# Patient Record
Sex: FEMALE | Race: WHITE | NOT HISPANIC OR LATINO | Employment: OTHER | URBAN - NONMETROPOLITAN AREA
[De-identification: names, ages, dates, MRNs, and addresses within clinical notes are randomized per-mention and may not be internally consistent; named-entity substitution may affect disease eponyms.]

---

## 2017-02-02 ENCOUNTER — OFFICE VISIT (OUTPATIENT)
Dept: ORTHOPEDIC SURGERY | Facility: CLINIC | Age: 67
End: 2017-02-02

## 2017-02-02 DIAGNOSIS — S92.345D CLOSED NONDISPLACED FRACTURE OF FOURTH METATARSAL BONE OF LEFT FOOT WITH ROUTINE HEALING, SUBSEQUENT ENCOUNTER: ICD-10-CM

## 2017-02-02 DIAGNOSIS — S92.902D FOOT FRACTURE, LEFT, WITH ROUTINE HEALING, SUBSEQUENT ENCOUNTER: Primary | ICD-10-CM

## 2017-02-02 PROCEDURE — 99213 OFFICE O/P EST LOW 20 MIN: CPT | Performed by: ORTHOPAEDIC SURGERY

## 2017-02-02 PROCEDURE — 73620 X-RAY EXAM OF FOOT: CPT | Performed by: ORTHOPAEDIC SURGERY

## 2017-02-02 NOTE — PROGRESS NOTES
Chief Complaint   Patient presents with   • Left Foot - Follow-up           HPI the patient is here today for a follow up of her left foot. She fractured this foot on 08/01/2016.  Patient is doing well at this point.  Swelling and bruising are fully settled down.  She does not have a clinical deformity.  Neurovascular status is intact.  Dorsiflexion and lateral flexion are fully preserved.  She is able to circumduct the foot without any difficulty.  Her gait is normal without any limp.  She is able to place her foot in a plantigrade position without any difficulty.  The forefoot shows a hallux rigidus deformity.  Patient has limited range of dorsiflexion of the first MTP joint of the great toe.  She has tenderness over the dorsal capsule of the first metatarsophalangeal joint.  Flexor and extensor tendon function are both well preserved        There were no vitals filed for this visit.        Review of Systems   Constitutional: Negative for activity change, appetite change, fatigue, fever and unexpected weight change.   HENT: Negative for congestion, sneezing and voice change.    Eyes: Negative for visual disturbance.   Respiratory: Negative for cough, chest tightness and wheezing.    Cardiovascular: Negative for chest pain, palpitations and leg swelling.   Gastrointestinal: Negative for abdominal distention, constipation, diarrhea and vomiting.   Endocrine: Negative for polydipsia, polyphagia and polyuria.   Genitourinary: Negative for decreased urine volume, difficulty urinating, dysuria, flank pain and frequency.   Musculoskeletal: Positive for arthralgias. Negative for back pain, gait problem, joint swelling, neck pain and neck stiffness.   Skin: Negative for color change, pallor and wound.   Allergic/Immunologic: Negative for environmental allergies and food allergies.   Neurological: Negative for dizziness, weakness and headaches.   Hematological: Does not bruise/bleed easily.   Psychiatric/Behavioral:  Negative for agitation, confusion and decreased concentration. The patient is not nervous/anxious.            Physical Exam   Constitutional: She is oriented to person, place, and time. She appears well-developed and well-nourished.   HENT:   Head: Normocephalic.   Eyes: Conjunctivae are normal. Pupils are equal, round, and reactive to light.   Neck: Normal range of motion. Neck supple. No JVD present.   Cardiovascular: Normal rate, normal heart sounds and intact distal pulses.    Pulmonary/Chest: Effort normal and breath sounds normal. No respiratory distress.   Abdominal: Soft. Bowel sounds are normal. There is no tenderness. There is no guarding.   Lymphadenopathy:     She has no cervical adenopathy.   Neurological: She is alert and oriented to person, place, and time. She has normal reflexes.   Skin: Skin is warm and dry.   Psychiatric: Her behavior is normal. Judgment and thought content normal.   Vitals reviewed.          Joint/Body Part Specific Exam: Left foot:  The foot is NOT swollen and tender. Lisfranc joint is stable. There is NO tenderness along the shaft of the 5th metatarsal extending up to the base. Appropriate amounts of swelling and bruising are noted. There is no evidence of a compartmental syndrome or instability of the midfoot. Dorsalis pedis and posterior tibial artery pulses are palpable. Common peroneal nerve function is well preserved. Dorsiflexion and plantarflexion are both normal along with the 4th metatarsal injury. The arches of the foot are fairly well preserved. Patient’s gait cannot be checked because they are unable to bear any weight on the lower extremity. The ankle mortise is stable.       X-RAY Report:  left Ankle X-Ray  Indication: Evaluation of healing off a fourth metatarsal fracture  Views: AP, Lateral, Mortise  Findings: Well healed, fully consolidated fracture  yes fracture  no bony lesion  Soft tissues within normal limits  within normal limits joint spaces  Hardware  appropriately positioned not applicable    yes prior studies available for comparison.    X-RAY was ordered and reviewed by Ron Aranda MD        Diagnostics:        Dk was seen today for follow-up.    Diagnoses and all orders for this visit:    Foot fracture, left, with routine healing, subsequent encounter  -     XR Foot 2 View Left    Closed nondisplaced fracture of fourth metatarsal bone of left foot with routine healing, subsequent encounter          Procedures        Plan:  Weightbearing as tolerated.    Reinjury precautions.    Home-based exercise program including exercises of the hindfoot.    Calcium and vitamin D for bone health.    Patient has a hallux rigidus of the great toe which might eventually require surgical intervention including the possibility of a dorsal cheilectomy; she is wanting to think about that.    Follow-up in my office when necessary basis

## 2017-02-11 PROBLEM — S92.902A FOOT FRACTURE, LEFT: Status: ACTIVE | Noted: 2017-02-11

## 2018-06-19 ENCOUNTER — OFFICE VISIT CONVERTED (OUTPATIENT)
Dept: PULMONOLOGY | Facility: CLINIC | Age: 68
End: 2018-06-19
Attending: PHYSICIAN ASSISTANT

## 2018-07-18 ENCOUNTER — CONVERSION ENCOUNTER (OUTPATIENT)
Dept: MAMMOGRAPHY | Facility: HOSPITAL | Age: 68
End: 2018-07-18

## 2019-03-15 ENCOUNTER — HOSPITAL ENCOUNTER (OUTPATIENT)
Dept: GENERAL RADIOLOGY | Facility: HOSPITAL | Age: 69
Discharge: HOME | End: 2019-03-15
Attending: PHYSICIAN ASSISTANT

## 2019-03-28 ENCOUNTER — OFFICE VISIT CONVERTED (OUTPATIENT)
Dept: PULMONOLOGY | Facility: CLINIC | Age: 69
End: 2019-03-28
Attending: INTERNAL MEDICINE

## 2019-09-04 ENCOUNTER — OUTSIDE FACILITY SERVICE (OUTPATIENT)
Dept: SLEEP MEDICINE | Facility: HOSPITAL | Age: 69
End: 2019-09-04

## 2019-09-04 ENCOUNTER — HOSPITAL ENCOUNTER (OUTPATIENT)
Dept: SLEEP MEDICINE | Facility: HOSPITAL | Age: 69
Discharge: HOME | End: 2019-09-04
Attending: INTERNAL MEDICINE

## 2019-09-04 PROCEDURE — 99204 OFFICE O/P NEW MOD 45 MIN: CPT | Performed by: INTERNAL MEDICINE

## 2019-09-19 ENCOUNTER — OFFICE VISIT CONVERTED (OUTPATIENT)
Dept: CARDIOLOGY | Facility: CLINIC | Age: 69
End: 2019-09-19
Attending: INTERNAL MEDICINE

## 2019-09-19 ENCOUNTER — CONVERSION ENCOUNTER (OUTPATIENT)
Dept: CARDIOLOGY | Facility: CLINIC | Age: 69
End: 2019-09-19

## 2020-01-20 ENCOUNTER — OFFICE VISIT CONVERTED (OUTPATIENT)
Dept: PULMONOLOGY | Facility: CLINIC | Age: 70
End: 2020-01-20
Attending: PHYSICIAN ASSISTANT

## 2020-01-29 ENCOUNTER — HOSPITAL ENCOUNTER (OUTPATIENT)
Dept: GENERAL RADIOLOGY | Facility: HOSPITAL | Age: 70
Discharge: HOME | End: 2020-01-29
Attending: PHYSICIAN ASSISTANT

## 2020-03-11 ENCOUNTER — CONVERSION ENCOUNTER (OUTPATIENT)
Dept: OTHER | Facility: HOSPITAL | Age: 70
End: 2020-03-11

## 2020-12-07 ENCOUNTER — HOSPITAL ENCOUNTER (OUTPATIENT)
Dept: SLEEP MEDICINE | Facility: HOSPITAL | Age: 70
Discharge: HOME | End: 2020-12-07
Attending: INTERNAL MEDICINE

## 2020-12-07 ENCOUNTER — OUTSIDE FACILITY SERVICE (OUTPATIENT)
Dept: SLEEP MEDICINE | Facility: HOSPITAL | Age: 70
End: 2020-12-07

## 2020-12-07 PROCEDURE — 99213 OFFICE O/P EST LOW 20 MIN: CPT | Performed by: INTERNAL MEDICINE

## 2021-01-05 ENCOUNTER — OFFICE VISIT CONVERTED (OUTPATIENT)
Dept: GASTROENTEROLOGY | Facility: CLINIC | Age: 71
End: 2021-01-05
Attending: NURSE PRACTITIONER

## 2021-04-14 ENCOUNTER — OFFICE VISIT CONVERTED (OUTPATIENT)
Dept: PULMONOLOGY | Facility: CLINIC | Age: 71
End: 2021-04-14
Attending: PHYSICIAN ASSISTANT

## 2021-04-20 ENCOUNTER — HOSPITAL ENCOUNTER (OUTPATIENT)
Dept: GENERAL RADIOLOGY | Facility: HOSPITAL | Age: 71
Discharge: HOME | End: 2021-04-20
Attending: PHYSICIAN ASSISTANT

## 2021-05-05 ENCOUNTER — OFFICE VISIT CONVERTED (OUTPATIENT)
Dept: GASTROENTEROLOGY | Facility: CLINIC | Age: 71
End: 2021-05-05
Attending: NURSE PRACTITIONER

## 2021-05-14 VITALS
HEART RATE: 81 BPM | BODY MASS INDEX: 30.96 KG/M2 | WEIGHT: 181.37 LBS | TEMPERATURE: 98 F | SYSTOLIC BLOOD PRESSURE: 161 MMHG | HEIGHT: 64 IN | DIASTOLIC BLOOD PRESSURE: 93 MMHG

## 2021-05-14 NOTE — PROGRESS NOTES
Progress Note      Patient Name: Dk Zuniga   Patient ID: [id number]   Sex: Female   YOB: 1950    Primary Care Provider: Haritha Iraheta MD   Referring Provider: Susan CRAWFORD    Visit Date: January 5, 2021    Provider: TY Nagel   Location: [facility] [address]   Location Address: [address] [place] [address]  [id number]   Location Phone: [phone]          Chief Complaint  · constipated   · nausea   · vomiting       History Of Present Illness  Dk Zuniga is a 70 year old /White female who presents to the office today.      Pt initially seen in March 2020 for dysphagia and some constipation. Pt did not do scopes that were ordered d/t caregiving for mother and COVID. Patient thinks last colonoscopy about 10 years ago.  Patient follows with Dr. Cortes, diagnosed with possible pulmonary fibrosis per patient.  History of being seen here in 2016 due to esophageal spasm but was doing well on Cardizem.    Pt states she's having worsening constipation, Assumption #1; states dysphagia is actually better. Pt requests med today for constipation. States going gluten free has helped her a little bit w improvement in constipation. No blood seen in stool. Pt states she was drinking some ETOH but has stopped, acid reflux is better. Still taking Protonix. No unint wt loss. Pt states she's willing to do scopes but cannot do now d/t needing transportation and also wants to have COVID vaccine first, states will do scopes in summer. Pt also caregiving for her .       Past Medical History  Asthma; Dysphagia; Essential hypertension; Family history of colon cancer; Fibrosis lung; GERD (gastroesophageal reflux disease); High cholesterol; Lung disease; Sjogren's disease; Sleep apnea; Spontaneous pneumothorax         Past Surgical History  Appendectomy; Colonoscopy; EGD; Gallbladder; Hysterectomy         Medication List  Name Date Started Instructions   albuterol sulfate  90 mcg/actuation inhalation HFA aerosol inhaler  inhale 1 puff (90 mcg) by inhalation route every 6 hours as needed   atorvastatin 20 mg oral tablet  take 1 tablet (20 mg) by oral route once daily   baclofen 10 mg oral tablet  take 1 tablet (10 mg) by oral route 3 times per day   Benadryl 25 mg oral capsule  take 1 capsule (25 mg) by oral route 3 times per day   Bystolic 5 mg oral tablet  take 1 tablet (5 mg) by oral route once daily   Co Q-10 100 mg oral capsule  take 1 capsule by oral route daily   diltiazem HCl 30 mg oral tablet  take 1 tablet (30 mg) by oral route 3 times per day   elderberry fruit and flower 460-115 mg oral capsule  take 1 capsule by oral route daily   EpiPen 0.3 mg/0.3 mL injection auto-injector  inject 0.3 milliliter (0.3 mg) by intramuscular route once as needed for anaphylaxis   Fish Oil 1,000 mg (120 mg-180 mg) oral capsule  take 1 capsule by oral route daily   flaxseed oil 1,000 mg oral capsule  take 1 capsule by oral route daily   fluticasone propionate 50 mcg/actuation nasal spray,suspension  spray 1 spray (50 mcg) in each nostril by intranasal route once daily   Golytely 236-22.74-6.74 -5.86 gram oral recon soln 03/11/2020 take as directed   hydrochlorothiazide 12.5 mg oral capsule  take 1 capsule (12.5 mg) by oral route once daily   losartan 50 mg oral tablet  take 1 tablet (50 mg) by oral route 2 times per day   magnesium 200 mg oral tablet  take 3 tablets by oral route daily   Miralax 17 gram/dose oral powder  take 17 gram mixed with 8 oz. water, juice, soda, coffee or tea by oral route once daily   montelukast 10 mg oral tablet  take 1 tablet (10 mg) by oral route once daily in the evening   nitroglycerin oral  take 1 by oral route daily   pantoprazole 40 mg oral tablet,delayed release (DR/EC)  take 1 tablet (40 mg) by oral route once daily   Pepcid 40 mg oral tablet  take 1 tablet (40 mg) by oral route once daily at bedtime   Stool Softener 50 mg oral capsule  take 1 capsule (50  mg) by oral route once daily at bedtime as needed   turmeric 400 mg oral capsule  take 1 capsule by oral route daily   Ventolin HFA 90 mcg/actuation inhalation HFA aerosol inhaler  inhale 1 puff (90 mcg) by inhalation route every 6 hours as needed   Vitamin B-12 1,000 mcg oral tablet  take 1 tablet by oral route daily   Vitamin D2 1,250 mcg (50,000 unit) oral capsule  take 1 capsule by oral route   zinc 50 mg oral tablet  take 1 tablet by oral route daily   Zofran 4 mg oral tablet  take 2 tablets (8 mg) by oral route 1-2 hours prior to radiation therapy         Allergy List  Latex; PENICILLINS       Allergies Reconciled  Family Medical History  Family history of colon cancer; Family history of heart disease         Social History  Alcohol (Current some day); lives with spouse; ; Retired; Tobacco (Former)         Review of Systems  · Constitutional  o Admits  o : good general health lately, no acute distress  · Gastrointestinal  o Denies  o : additional gastrointestinal symptoms except as noted in the HPI  · Psychiatric  o Admits  o : pleasant affect      Physical Examination  · Constitutional  o Appearance  o : well developed, well-nourished, in no acute distress  · Head and Face  o Head  o :   § Inspection  § : atraumatic, normocephalic  · Eyes  o Sclerae  o : sclerae white, no sclerae icterus  · Neck  o Inspection/Palpation  o : supple  · Respiratory  o Respiratory Effort  o : breathing unlabored  o Inspection of Chest  o : normal appearance, no retractions  · Cardiovascular  o Peripheral Vascular System  o :   § Extremities  § : no cyanosis, clubbing or edema  · Gastrointestinal  o Abdominal Examination  o : soft, nontender to palpation  · Skin and Subcutaneous Tissue  o General Inspection  o : no lesions present, no rashes present  · Neurologic  o Mental Status Examination  o :   § Orientation  § : grossly oriented to person, place and time  § Speech/Language  § : communication ability within normal  limits, voice quality normal, articulation of speech normal, no evidence of aphasia  § Attention  § : attention normal, concentration abilities normal  o Sensation  o : grossly intact  o Gait and Station  o :   § Gait Screening  § : normal gait  · Psychiatric  o General  o : Alert and oriented x3  o Mood and Affect  o : Mood and affect are appropriate to circumstances              Assessment  · Constipation     564.00/K59.00  · Dysphagia     787.20/R13.10  · Esophageal spasm     530.5/K22.4  HX of      Plan  · Medications  o Linzess 72 mcg oral capsule   SIG: take 1 capsule by oral route daily for 30 days take 30 min before meal   DISP: (30) Capsule with 3 refills  Prescribed on 01/05/2021     · Instructions  o Encouraged to follow-up with Primary Care Provider for preventative care.  o Patient was educated/instructed on their diagnosis, treatment and medications prior to discharge from the clinic today.  o Patient instructed to seek medical attention urgently for new or worsening symptoms.  o Follow Up in 4 months.  o Set up scopes at f/u            Electronically Signed by: TY Nagel -Author on January 5, 2021 05:32:39 PM

## 2021-05-15 VITALS
SYSTOLIC BLOOD PRESSURE: 156 MMHG | BODY MASS INDEX: 31.92 KG/M2 | HEART RATE: 74 BPM | WEIGHT: 187 LBS | DIASTOLIC BLOOD PRESSURE: 78 MMHG | HEIGHT: 64 IN

## 2021-05-15 VITALS
HEIGHT: 64 IN | DIASTOLIC BLOOD PRESSURE: 64 MMHG | HEART RATE: 69 BPM | BODY MASS INDEX: 31.76 KG/M2 | SYSTOLIC BLOOD PRESSURE: 152 MMHG | WEIGHT: 186 LBS

## 2021-05-27 ENCOUNTER — OFFICE VISIT CONVERTED (OUTPATIENT)
Dept: CARDIOLOGY | Facility: CLINIC | Age: 71
End: 2021-05-27
Attending: INTERNAL MEDICINE

## 2021-05-28 ENCOUNTER — TRANSCRIBE ORDERS (OUTPATIENT)
Dept: ADMINISTRATIVE | Facility: HOSPITAL | Age: 71
End: 2021-05-28

## 2021-05-28 ENCOUNTER — TRANSCRIBE ORDERS (OUTPATIENT)
Dept: CARDIOLOGY | Facility: CLINIC | Age: 71
End: 2021-05-28

## 2021-05-28 VITALS
BODY MASS INDEX: 30.96 KG/M2 | TEMPERATURE: 98.5 F | OXYGEN SATURATION: 98 % | SYSTOLIC BLOOD PRESSURE: 175 MMHG | SYSTOLIC BLOOD PRESSURE: 150 MMHG | WEIGHT: 189 LBS | RESPIRATION RATE: 14 BRPM | HEIGHT: 64 IN | HEIGHT: 64 IN | DIASTOLIC BLOOD PRESSURE: 73 MMHG | OXYGEN SATURATION: 98 % | RESPIRATION RATE: 16 BRPM | TEMPERATURE: 98 F | HEART RATE: 70 BPM | HEART RATE: 96 BPM | WEIGHT: 181.37 LBS | BODY MASS INDEX: 32.27 KG/M2 | DIASTOLIC BLOOD PRESSURE: 68 MMHG

## 2021-05-28 VITALS
RESPIRATION RATE: 14 BRPM | HEART RATE: 80 BPM | SYSTOLIC BLOOD PRESSURE: 152 MMHG | WEIGHT: 179.5 LBS | BODY MASS INDEX: 31.8 KG/M2 | OXYGEN SATURATION: 98 % | HEIGHT: 63 IN | DIASTOLIC BLOOD PRESSURE: 75 MMHG | TEMPERATURE: 97.3 F

## 2021-05-28 VITALS
HEART RATE: 72 BPM | WEIGHT: 188.25 LBS | RESPIRATION RATE: 16 BRPM | DIASTOLIC BLOOD PRESSURE: 66 MMHG | HEIGHT: 64 IN | OXYGEN SATURATION: 96 % | BODY MASS INDEX: 32.14 KG/M2 | SYSTOLIC BLOOD PRESSURE: 162 MMHG | TEMPERATURE: 98.5 F

## 2021-05-28 DIAGNOSIS — R07.9 CHEST PAIN, UNSPECIFIED TYPE: Primary | ICD-10-CM

## 2021-05-28 NOTE — PROGRESS NOTES
Patient: TIFFANIE SÁNCHEZ     Acct: [id number]     Report: #[id number]  UNIT #: [id number]     : 1950    Encounter Date:2021  PRIMARY CARE: WAI ROMERO  ***Signed***  --------------------------------------------------------------------------------------------------------------------  Chief Complaint      Encounter Date      2021            Primary Care Provider      WAI ROMERO            Patient Complaint      Patient is complaining of      patient here for 1yr follow up SjÃ¶gren's syndrome with cystic bronchiectasis            VITALS      Height 5 ft 3 in / 160.02 cm      Weight 179 lbs 8 oz / 81.224095 kg      BSA 1.85 m2      BMI 31.8 kg/m2      Temperature 97.3 F / 36.28 C - Temporal      Pulse 80      Respirations 14      Blood Pressure 152/75 Sitting, Right Arm      Pulse Oximetry 98%, room air            HPI      The patient is a 71 year old female with history of Sjogren's disease and     bronchiectasis here for follow up today.             She is a patient of Dr. Johansen and was last seen in the clinic on 20 and     the patient has had difficulty following up due to the pandemic. Overall the     patient states she is doing well, states she feels okay from a respiratory     standpoint. The patient states most of her current issues are GI related and she    follows up with Dr. Kang and is due for an EGD and colonoscopy. She states she    is struggling with irritable bowel syndrome-C and has been put on Linzess since     her last office visit. She states she has a dry cough and since we last saw her     she has been going to an allergist and was placed on allergy shots. She feels     this is greatly improved her symptoms. Unfortunately her mother was recently     placed in a nursing home due to COVID-19 infection and slow recovery so the p    atient has been cleaning out her mother's apartment and has been exposed to mold    and dust that she feels is causing her current dry  cough. The patient states she    has been doubling her masks and showing/changing clothes as soon as she gets     home from cleaning. The patient states she has not had to use her albuterol or     Xopenex inhaler recently. She continues to complain of some mid-line chest pain     that she states is chronic, she was never able to have a heart catheterization     which was an issue during her last visit as well. The patient states most of her    pain is due to surgical changes from multiple surgeries in her chest region. She    has sleep apnea and is well controlled on her home CPAP machine. She is up to     date on COVID-19 vaccine and states she does not need any refills on her     medications at this time.             A 10 out of 14 point Review of Systems was explored with the patient and is ne    gative unless otherwise stated in the HPI.            ROS      Constitutional:  Denies: Fatigue, Fever, Weight gain, Weight loss, Chills,     Insomnia, Other      Respiratory/Breathing:  Complains of: Shortness of air; Denies: Wheezing, Cough,    Hemoptysis, Pleuritic pain, Other      Endocrine:  Denies: Polydipsia, Polyuria, Heat/cold intolerance, Abnorml     menstrual pattern, Diabetes, Other      Eyes:  Denies: Blurred vision, Vision Changes, Other      Ears, nose, mouth, throat:  Denies: Mouth lesions, Thrush, Throat pain,     Hoarseness, Allergies/Hay Fever, Post Nasal Drip, Headaches, Recent Head Injury,    Nose Bleeding, Neck Stiffness, Thyroid Mass, Hearing Loss, Ear Fullness, Dry     Mouth, Nasal or Sinus Pain, Dry Lips, Nasal discharge, Nasal congestion, Other      Cardiovascular:  Denies: Palpitations, Syncope, Claudication, Chest Pain, Wake     up Gasping for air, Leg Swelling, Irregular Heart Rate, Cyanosis, Dyspnea on     Exertion, Other      Gastrointestinal:  Denies: Nausea, Constipation, Diarrhea, Abdominal pain,     Vomiting, Difficulty Swallowing, Reflux/Heartburn, Dysphagia, Jaundice,     Bloating,  Melena, Bloody stools, Other      Genitourinary:  Denies: Urinary frequency, Incontinence, Hematuria, Urgency,     Nocturia, Dysuria, Testicular problems, Other      Musculoskeletal:  Denies: Joint Pain, Joint Stiffness, Joint Swelling, Myalgias,    Other      Hematologic/lymphatic:  DENIES: Lymphadenopathy, Bruising, Bleeding tendencies,     Other      Neurological:  Complains of: Headache; Denies: Numbness, Weakness, Seizures,     Other      Psychiatric:  Denies: Anxiety, Appropriate Effect, Depression, Other      Sleep:  No: Excessive daytime sleep, Morning Headache?, Snoring, Insomnia?, Stop    breathing at sleep?, Other      Integumentary:  Denies: Rash, Dry skin, Skin Warm to Touch, Other      Immunologic/Allergic:  Denies: Latex allergy, Seasonal allergies, Asthma,     Urticaria, Eczema, Other      Immunization status:  No: Up to date            FAMILY/SOCIAL/MEDICAL HX      Surgical History:  No: AAA Repair, Abdominal Surgery, Angioplasty, Appendectomy,    Back Surgery, Bladder Surgery, Bowel Surgery, Breast Surgery, CABG, Carotid     Stenosis, Cholecystectomy, Ear Surgery, Eye Surgery, Head Surgery, Hernia     Surgery, Kidney Surgery, Nose Surgery, Oral Surgery, Orthopedic Surgery,     Prostatectomy, Rectal Surgery, Spinal Surgery, Testicular Surgery, Throat     Surgery, Valve Replacement, Vascular Surgery, Other Surgeries      Stroke - Family Hx:  Grandparent      Heart - Family Hx:  Mother, Father      Diabetes - Family Hx:  Uncle      Cancer/Type - Family Hx:  Grandparent      Other Family Medical History:  Father      Smoking status:  Former smoker (1 ppd for 18 years quit 1981)      [place]bie/Social Activities:  READING      Whom do you live with?:        Release Medical Information to:  Other      Number of Abortions:  No      Anticoagulation Therapy:  No      Medical History:  Yes: Asthma, Anxiety, High Blood Pressure; No: Anemia,     Arthritis, Blood Disease, Broken Bones, Cataracts, Chemical  Dependency,     Chemotherapy/Cancer, Chronic Bronchitis/COPD, Emphysema, Chronic Liver Disease,     Colon Trouble, Colitis, Diverticulitis, Congestive Heart Failu, Deafness or     Ringing Ears, Depression, Bipolar Disorder, Diabetes, Epilepsy, Seizures, Gl    aucoma, Gall Stones, Gout, Head Injury, Heart Attack, Heart Murmur,     Hemorrhoids/Rectal Prob, Hepatitis, Hiatal Hernia, HIV (Do not ask - volu,     Kidney or Bladder Disease, Kidney Stones, Migrane Headaches, Mitral Valve     Prolapse, Psychiatric Care, Reflux Disease, Rheumatic Fever, Sexually     Transmitted Dis, Shortness Of Breath, Sinus Trouble, Skin Disease/Psoriais/Ecz,     Stroke, Thyroid Problem, Tuberculosis or Pos TB Te, Miscellaneous Medical/oth      Psychiatric History      anxiety            PREVENTION      Hx Influenza Vaccination:  Yes      Date Influenza Vaccine Given:  Oct 1, 2020      Influenza Vaccine Declined:  No      2 or More Falls in Past Year?:  No      Fall Past Year with Injury?:  No      Hx Pneumococcal Vaccination:  Yes      Encouraged to follow-up with:  PCP regarding preventative exams.      Chart initiated by      Lauren Sloan CMA            ALLERGIES/MEDICATIONS      Allergies:        Coded Allergies:             Tetanus Vaccines And Toxoid (Verified  Allergy, SWELLING, 4/14/21)           PENICILLINS (Verified  Adverse Reaction, Intermediate, RASH, 4/14/21)           LATEX (Verified  Adverse Reaction, Mild, RASH, 4/14/21)      Medications    Last Reconciled on 4/14/21 11:25 by EDOUARD MARLOW      EPINEPHrine HCl (Epipen 2-Roni) 0.3 Mg/0.3 Ml Auto.injct      0.3 MG IM AS DIRECTED, #0.6 ML         Reported         4/14/21       Ondansetron Hcl (ONDANSETRON HCL) 4 Mg Tablet      4 MG PO Q6H PRN for NAUSEA, TAB 0 Refills         Reported         4/14/21       Famotidine (Pepcid) 40 Mg Tablet      40 MG PO QDAY, TAB         Reported         4/14/21       Omega-3 Fatty Acids/Fish Oil (Fish Oil 1000 Mg) 1 Each Capsule      1 GM  PO BID, #60 CAP 0 Refills         Reported         4/14/21       Linaclotide (Linzess) 72 Mcg Capsule      72 MCG PO QDAY, CAP         Reported         4/14/21       Ubidecarenone (Co Q-10) 1 Each Capsule      100 MG PO QDAY, CAP         Reported         4/14/21       MDI-Albuterol (Ventolin HFA) 18 Gm Hfa.aer.ad      2 PUFFS INH Q4H PRN for SHORTNESS OF BREATH for 30 Days, #1 INH 3 Refills         Prov: JUAN HANSEN PA-C         1/21/20       Montelukast Sodium (Singulair*) 10 Mg Tab      10 MG PO HS, #90 TAB 3 Refills         Prov: JUAN HANSEN PA-C         1/20/20       dilTIAZem HCL (Cardizem) 30 Mg Tab      30 MG PO TID, #90 TAB         Reported         1/20/20       Ergocalciferol (Vitamin D2) (Vitamin D2) 50,000 Unit Capsule      25484 UNITS PO MO, #8 CAP         Reported         1/20/20       Hctz (hydroCHLOROthiazide) 12.5 Mg Capsule      12.5 MG PO QDAY, #30 CAP 0 Refills         Reported         1/20/20       Atorvastatin (Atorvastatin) 20 Mg Tablet      20 MG PO HS, #30 TAB 0 Refills         Reported         1/20/20       Cyanocobalamin (Vitamin B-12*) 100 Mcg Tablet      2500 MCG PO QDAY, TAB         Reported         6/19/18       Magnesium Amino Acid Chelate (Magnesium*) 100 Mg Tablet      400 MG PO QDAY, TAB         Reported         6/19/18       Turmeric Root Extract (Turmeric) 1 Each Capsule      500 MG PO, CAP         Reported         6/19/18       Losartan Potassium (Losartan*) 25 Mg Tablet      50 MG PO BID, #120 TAB 0 Refills         Reported         6/19/18       Ergocalciferol (Vitamin D2) 50,000 Unit Capsule      93202 UNITS PO WE@09, #4 CAP 0 Refills         Reported         8/4/17      Current Medications      Current Medications Reviewed 4/14/21            EXAM      Vital Signs Reviewed      Gen: WDWN, Alert, NAD.        HEENT:  PERRL, EOMI.  OP, nares clear, no sinus tenderness.      Neck:  Supple, no JVD, no thyromegaly.      Lymph: No axillary, cervical, supraclavicular  lymphadenopathy noted bilaterally.      Chest:  Good aeration, clear to auscultation bilaterally, tympanic to percussion    bilaterally, no work of breathing noted.      CV:  RRR, no MGR, pulses 2+, equal.      Abd:  Soft, NT, ND, + BS, no HSM.      EXT:  No clubbing, no cyanosis, no edema, no joint tenderness.       Neuro:  A  Skin: No rashes or lesions.      Vtials      Vitals:             Height 5 ft 3 in / 160.02 cm           Weight 179 lbs 8 oz / 81.585104 kg           BSA 1.85 m2           BMI 31.8 kg/m2           Temperature 97.3 F / 36.28 C - Temporal           Pulse 80           Respirations 14           Blood Pressure 152/75 Sitting, Right Arm           Pulse Oximetry 98%, room air            REVIEW      Results Reviewed      [facility] Results Reviewed?:  Yes Prev Lab Results, Yes Prev Radiology Results, Yes P    revious [facility] Records      Lab Results      I personally reviewed PHIL Hansen last office visit notes from 01/20/20.            Assessment      Bronchiectasis - J47.9            Notes      New Medications      * Ubidecarenone (Co Q-10) 1 EACH CAPSULE: 100 MG PO QDAY      * Linaclotide (Linzess) 72 MCG CAPSULE: 72 MCG PO QDAY      * Omega-3 Fatty Acids/Fish Oil (Fish Oil 1000 Mg) 1 EACH CAPSULE: 1 GM PO BID       #60      * Famotidine (Pepcid) 40 MG TABLET: 40 MG PO QDAY      * ONDANSETRON HCL 4 MG TABLET: 4 MG PO Q6H PRN NAUSEA      * EPINEPHrine HCl (Epipen 2-Roni) 0.3 MG/0.3 ML AUTO.INJCT: 0.3 MG IM AS DIRECTED      #0.6         Instructions: Use in case of allergic reaction.      Discontinued Medications      * Elpidio-Fluticasone (Fluticasone 50 mcg) 16 GM SPRAY.SUSP: 1 PUFFS NARE EACH QDAY       #1         Dx: Bronchiectasis - J47.9      New Diagnostics      * Chest W/O Cont CT, As Soon As Possible         Dx: Bronchiectasis - J47.9      ASSESSMENT:      1. Sjogren's disease with cystic bronchiectasis.       2. Obstructive sleep apnea on nightly CPAP.       3. History of Raynaud's Phenomenon.        4. History of esophageal dysmotility followed by Dr. Kang.       5. Recent issues with irritable bowel syndrome and constipation, also followed     by Dr. Kang.       6. Hypertension, better controlled.       7. Seasonal allergies on allergy shots.             PLAN:      1. We will obtain a CT scan of the chest given her history of bronchiectasis. We    will have this scheduled.       2. I advised the patient to continue following up with her allergist for allergy    shots.       3. I advised the patient that if she continues to cough for feel short of breath    when she is around mold or dust, she should use her albuterol or Xopenex     inhaler.       4. Continue to follow up with Dr. Kang for her multiple GI issues.       5. Follow up with Dr. Ag and Dr. Palma for CPAP.       6. Follow up with Dr. Bhagat for Sjogren's disease.       7. The patient is advised to continue working on deep breathing exercises.       8. Continue Singulair and flonase.       9. All the patient's questions were answered and she expressed understanding.       10. Follow up with Dr. Cortes in 9-12 months, sooner if needed.            Electronically signed by EDOUARD MARLOW  04/23/2021 21:27  Electronically signed by Nelson Cortes  05/01/2021 19:08       Disclaimer: Converted document may not contain table formatting or lab diagrams. Please see [employer] [facility] for the authenticated document.

## 2021-05-28 NOTE — PROGRESS NOTES
Patient: TIFFANIE SÁNCHEZ     Acct: [id number]     Report: #[id number]  UNIT #: [id number]     : 1950    Encounter Date:2018  PRIMARY CARE: WAI ROMERO  ***Signed***  --------------------------------------------------------------------------------------------------------------------  Chief Complaint      Encounter Date      2018            Primary Care Provider      WAI ROMERO            Referring Provider      WAI ROMERO            Patient Complaint      Patient is complaining of      Pt here for 9 month follow up/Bronchitis            VITALS      Height 5 ft 4 in / 162.56 cm      Weight 181 lbs 6 oz / 82.916235 kg      BSA 1.96 m2      BMI 31.1 kg/m2      Temperature 98.5 F / 36.94 C - Oral      Pulse 70      Respirations 16      Blood Pressure 150/73 Sitting, Right Arm      Pulse Oximetry 98%, room air            HPI      The patient is a pleasant 68 year old white female who is a patient of Dr. Sebastian montes.  She is here for 9 month follow up of her Sj gren's syndrome with     esophageal dysmotility, history of recurrent bronchitis, history of secondary     to pneumothoraces in ,  and , status post pleurodesis.  Since her     last visit she tells me that she has done quite well.  She has not required any     maintenance inhalers in the past and does have a Ventolin rescue inhaler. She     tells me that she typically does not use her rescue inhaler more than once or     twice per month for coughing, wheezing or shortness of breath.  However, when     she does use the rescue inhaler, she does not feel that it helps her very much.      She does have some seasonal allergies and postnasal drip, but does quite well     with Singulair and is not having issues as long as she continues to take that.      She denies any current dyspnea on exertion, cough, wheezing, hemoptysis, fever     or chills. She is going to follow up with her rheumatologist soon regarding her     Sj gren's  syndrome as that appears to be progressing recently. She is no longer     on the pilocarpine for her dry eyes and dry mouth.  She does ask me today if we     can begin following her for her obstructive sleep apnea.  She had previously     seen a separate sleep medicine provider for this, but wishes to consolidate     this.  She is on nightly CPAP, reports great compliance with it, typically     using it more than 8 hours per night. She denies any issues with her CPAP and     feels well rested when she wakes up.              I have reviewed her ROS, medical, surgical and family history and agree with     those as entered in the chart.      Smoking history:  10-25 pack years      Counseling given:  Patient declined            ROS      Constitutional:  Complains of: Fatigue, Denies: Fever, Weight gain, Weight loss    , Chills, Insomnia, Other      Respiratory/Breathing:  Denies: Shortness of air, Wheezing, Cough, Hemoptysis,     Pleuritic pain, Other      Endocrine:  Denies: Polydipsia, Polyuria, Heat/cold intolerance, Abnorml     menstrual pattern, Diabetes, Other      Eyes:  Denies: Blurred vision, Vision Changes, Other      Ears, nose, mouth, throat:  Denies: Mouth lesions, Thrush, Throat pain,     Hoarseness, Allergies/Hay Fever, Post Nasal Drip, Headaches, Recent Head Injury    , Nose Bleeding, Neck Stiffness, Thyroid Mass, Hearing Loss, Ear Fullness, Dry     Mouth, Nasal or Sinus Pain, Dry Lips, Nasal discharge, Nasal congestion, Other      Cardiovascular:  Denies: Palpitations, Syncope, Claudication, Chest Pain, Wake     up Gasping for air, Leg Swelling, Irregular Heart Rate, Cyanosis, Dyspnea on     Exertion, Other      Gastrointestinal:  Denies: Nausea, Constipation, Diarrhea, Abdominal pain,     Vomiting, Difficulty Swallowing, Reflux/Heartburn, Dysphagia, Jaundice, Bloating    , Melena, Bloody stools, Other      Genitourinary:  Denies: Urinary frequency, Incontinence, Hematuria, Urgency,     Nocturia,  Dysuria, Testicular problems, Other      Musculoskeletal:  Denies: Joint Pain, Joint Stiffness, Joint Swelling, Myalgias    , Other      Hematologic/lymphatic:  DENIES: Lymphadenopathy, Bruising, Bleeding tendencies,     Other      Neurological:  Denies: Headache, Numbness, Weakness, Seizures, Other      Psychiatric:  Denies: Anxiety, Appropriate Effect, Depression, Other      Sleep:  No: Excessive daytime sleep, Morning Headache?, Snoring, Insomnia?,     Stop breathing at sleep?, Other      Integumentary:  Denies: Rash, Dry skin, Skin Warm to Touch, Other      Immunologic/Allergic:  Denies: Latex allergy, Seasonal allergies, Asthma,     Urticaria, Eczema, Other      Immunization status:  No: Up to date            FAMILY/SOCIAL/MEDICAL HX      Surgical History:  No: AAA Repair, Abdominal Surgery, Angioplasty, Appendectomy    , Back Surgery, Bladder Surgery, Bowel Surgery, Breast Surgery, CABG, Carotid     Stenosis, Cholecystectomy, Ear Surgery, Eye Surgery, Head Surgery, Hernia     Surgery, Kidney Surgery, Nose Surgery, Oral Surgery, Orthopedic Surgery,     Prostatectomy, Rectal Surgery, Spinal Surgery, Testicular Surgery, Throat     Surgery, Valve Replacement, Vascular Surgery, Other Surgeries      Stroke - Family Hx:  Grandparent      Heart - Family Hx:  Mother, Father      Diabetes - Family Hx:  Uncle      Cancer/Type - Family Hx:  Grandparent      Other Family Medical History:  Father (IPF)      Is Father Still Living?:  No      Is Mother Still Living?:  Yes      Social History:  No Tobacco Use, No Alcohol Use, No Recreational Drug use      Smoking status:  Former smoker      Smoking history:  10-25 pack years      Counseling given:  Patient declined      Exercise Activity:  Walking      How Often:  3-4 times      Occupation:  RETIRED       [employer]/Social Activities:  READING      Whom do you live with?:        Release Medical Information to:  Other (HUSAND AND SON)      Number of  Pregnancies:  1      Number of Delivers:  1      Number of Abortions:  No      Anticoagulation Therapy:  No      Antibiotic Prophylaxis:  No      Medical History:  Yes: Allergies, Asthma, Anxiety, High Blood Pressure, No:     Alcoholism, Anemia, Arthritis, Blood Disease, Broken Bones, Cataracts, Chemical     Dependency, Chemotherapy/Cancer, Chronic Bronchitis/COPD, Emphysema, Chronic     Liver Disease, Colon Trouble, Colitis, Diverticulitis, Congestive Heart Failu,     Deafness or Ringing Ears, Convulsions, Depression, Bipolar Disorder, Diabetes,     Epilepsy, Seizures, Forgetfullness, Glaucoma, Gall Stones, Gout, Head Injury,     Heart Attack, Heart Murmur, Hemorrhoids/Rectal Prob, Hepatitis, Hiatal Hernia,     High Cholesterol, HIV (Do not ask - volu, Jaundice, Kidney or Bladder Disease,     Kidney Stones, Migrane Headaches, Mitral Valve Prolapse, Night sweats, Phlebitis    , Psychiatric Care, Reflux Disease, Rheumatic Fever, Sexually Transmitted Dis,     Shortness Of Breath, Sinus Trouble, Skin Disease/Psoriais/Ecz, Stroke, Thyroid     Problem, Tuberculosis or Pos TB Te, Miscellaneous Medical/oth      Psychiatric History      Anxiety            PREVENTION      Hx Influenza Vaccination:  Yes      Date Influenza Vaccine Given:  Oct 1, 2017      Influenza Vaccine Declined:  Yes      2 or More Falls Past Year?:  No      Fall Past Year with Injury?:  No      Hx Pneumococcal Vaccination:  Yes      Encouraged to follow-up with:  PCP regarding preventative exams.      Chart initiated by      Kailee Flannery MA            ALLERGIES/MEDICATIONS      Allergies:        Coded Allergies:             PENICILLINS (Verified  Adverse Reaction, Intermediate, RASH, 6/19/18)           LATEX (Verified  Adverse Reaction, Mild, RASH, 6/19/18)      Medications    Last Reconciled on 6/19/18 13:40 by JUAN CH PA-C      Levalbuterol Tartrate (Xopenex HFA) 15 Gm Hfa.aer.ad      2 PUFFS INH RTQ4H Y for SHORTNESS OF BREATH, #1 INH 4  Refills         Prov: Racheal Chawla PA-C         6/19/18       Montelukast Sodium (Singulair*) 10 Mg Tab      10 MG PO HS, #90 TAB 3 Refills         Prov: Racheal Chawla PA-C         6/19/18       Cyanocobalamin (Vitamin B-12*) 100 Mcg Tablet      100 MCG PO QDAY, TAB         Reported         6/19/18       Thiamine HCl (Vitamin B-1*) 250 Mg Tablet      250 MG PO QDAY, TAB         Reported         6/19/18       Nitroglycerin (Nitrostat*) 0.4 Mg Tablet      0.4 MG SL ASDIR, #25 TAB 1 Refill         Reported         6/19/18       Cranberry Fruit (Cranberry) 400 Mg Tablet      4200 MG PO QDAY for 30 Days, #315 TAB         Reported         6/19/18       Magnesium Amino Acid Chelate (Magnesium*) 100 Mg Tablet      400 MG PO QDAY, TAB         Reported         6/19/18       Turmeric Root Extract (Turmeric) 1 Each Capsule      500 MG PO, CAP         Reported         6/19/18       Losartan Potassium (Losartan*) 25 Mg Tablet      50 MG PO BID, #120 TAB 0 Refills         Reported         6/19/18       Diltiazem (Diltiazem) 60 Mg Tab      30 MG PO TID, TAB         Reported         6/19/18       Aspirin (Aspirin*) 81 Mg Tab.chew      81 MG PO BID, #30 TAB.CHEW 0 Refills         Reported         10/12/17       Ergocalciferol (Ergocalciferol*) 50,000 Unit Capsule      58973 UNITS PO WE@09, #4 CAP 0 Refills         Reported         8/4/17       Hydrochlorothiazide (Hydrochlorothiazide*) 12.5 Mg Capsule      25 MG PO QDAY, #60 CAP 0 Refills         Reported         8/4/17      Current Medications      Current Medications Reviewed 6/19/18            EXAM      CONSTITUTIONAL: Pleasant  normal conversant.       EYES : Pink conjunctive, no ptosis, PERRL.       ENMT : Nose and ears appear normal, normal dentition, mild posterior pharyngeal     wall erythema, no sinus tenderness. Mallampati classification       Neck: Nontender, no masses, no thyromegaly, no nodules.      Resp : Lungs are grossly clear to auscultation.  No wheezes, rhonchi or      crackles appreciated.  Normal work of breathing.        CVS  : No carotid bruits, s1s2 nl, RRR, no murmur, rubs or gallop, no     peripheral edema       Chest wall: Normal rise with inspiration, nontender on palpation.      GI   : Abdomen soft, with no masses, no hepatosplenomegaly, no hernias, BS+      MSK  : Normal gait and station, no digital cyanosis or clubbing       Skin : No rashes, ulcerations or lesions, normal turgor and temperature      Neuro: CN II - XII intact, no sensory deficits, DTRs intact and symmetrical, no     motor weakness      Psych: Appropriate affect, A   Vitals      Vitals:             Height 5 ft 4 in / 162.56 cm           Weight 181 lbs 6 oz / 82.044686 kg           BSA 1.96 m2           BMI 31.1 kg/m2           Temperature 98.5 F / 36.94 C - Oral           Pulse 70           Respirations 16           Blood Pressure 150/73 Sitting, Right Arm           Pulse Oximetry 98%, room air            REVIEW      Results Reviewed      PCCS Results Reviewed?:  Yes Prev Lab Results, Yes Prev Radiology Results, Yes     Previous [facility] Records      Lab Results      I personally reviewed previous lab work, imaging and provider notes including     most recent chest CT.      Radiographic Results               [facility]                PACS RADIOLOGY REPORT            Patient: TIFFANIE SÁNCHEZ   Acct: #[id number]   Report: #[id number]            UNIT #: [id number]    DOS: 18 1059      INSURANCE:[facility]   ORDER #:[id number]      LOCATION:CT     : 1950            PROVIDERS      ADMITTING:     ATTENDING: Nelson Cortes      FAMILY:  WAI ROMERO   ORDERING:  Nelson Cortes         OTHER:    DICTATING:  Bernardo Mckeon MD            REQ #:[id number]   [facility]      REASON FOR EXAM:  BRONCHISTASIS      REASON FOR VISIT:  BRONCHISTASIS            *******Signed******         PROCEDURE:   CT  CHEST WITHOUT CONTRAST             COMPARISON:   [facility], CT, CHEST W/O CONTRAST, 10/10/2017, 12:    32.             INDICATIONS:   BRONCHISTASIS             TECHNIQUE:   CT images were created without the administration of contrast     material.               PROTOCOL:     Standard imaging protocol performed                RADIATION:     DLP: 571 mGy*cm          Automated exposure control was utilized to minimize radiation dose.              FINDINGS:         CT of the chest without contrast is compared previous study performed on 10/10/    2017.  Today's study       reveals that there is a small focal chronic appearing infiltrate with pleural     thickening and small       blebs and pleural thickening in the apex of the right upper lobe lung and to a     lesser extent apex       left upper lobe lung unchanged since previous study.  There is a right breast     surgical implant.  No       evidence of mass or adenopathy in the mediastinum or right or left pulmonary     trent.  The heart size       is normal.  No evidence of hiatal hernia.  No evidence of bronchitis or     bronchiectasis in the right       or left pulmonary trent.  There is a small curvilinear areas of scarring in the     right lower lobe and       left lower lobe lung.  There is mild pleural thickening surrounding the right     upper lobe and left       upper lobe lung and a small focal area of pleural thickening in the posterior     lateral aspect of the       right lower lobe lung unchanged since previous study.  No evidence of pneumonia     or pleural effusion       or pneumothorax or mass or metastatic disease in right or left lungs.  No acute     disease in the       superior aspect the abdomen.  The patient is status post cholecystectomy.      There is a small benign       cyst in the liver parenchyma in the posterior inferior lateral aspect of the     right lobe liver       measuring 2 cm in size.             CONCLUSION:          1. Small focal chronic appearing infiltrate with blebs and pleural thickening     in the right upper       lobe lung.      2. Mild pleural thickening surrounds the right left lungs unchanged.      3. No evidence of bronchitis or bronchiectasis.  No visualize pneumonia or     pleural effusion or       pneumothorax or mass in the right or left lungs.              ELROY ADAN MD             Electronically Signed and Approved By: ELROY ADAN MD on 6/11/2018 at     15:37                        Until signed, this is an unconfirmed preliminary report that may contain      errors and is subject to change.                                              GOLKE:      D:06/11/18 1537            Assessment      Mild persistent asthma - J45.30            Bronchiectasis - J47.9            Seasonal allergies - J30.2            Notes      New Medications      * Diltiazem 60 MG TAB: 30 MG PO TID      * Losartan Potassium (Losartan*) 25 MG TABLET: 50 MG PO BID #120      * Turmeric Root Extract (Turmeric) 1 EACH CAPSULE: 500 MG PO      * Magnesium Amino Acid Chelate (Magnesium*) 100 MG TABLET: 400 MG PO QDAY      * Cranberry Fruit (Cranberry) 400 MG TABLET: 4,200 MG PO QDAY 30 Days #315      * NITROGLYCERIN (Nitrostat*) 0.4 MG TABLET: 0.4 MG SL ASDIR #25       Instructions: DOSE= 1 TABLET SL EVERY 5 MINUTES PRN CHEST PAIN UP TO 3 TABLETS     PER EPISODE      * Thiamine HCl (Vitamin B-1*) 250 MG TABLET: 250 MG PO QDAY      * Cyanocobalamin (Vitamin B-12*) 100 MCG TABLET: 100 MCG PO QDAY      * Levalbuterol Tartrate (Xopenex HFA) 15 GM HFA.AER.AD: 2 PUFFS INH RTQ4H PRN     SHORTNESS OF BREATH #1      Renewed Medications      * Montelukast Sodium (Singulair*) 10 MG TAB: 10 MG PO HS #90      Discontinued Medications      * Pilocarpine HCl (Salagen) 5 MG TABLET: 5 MG PO TID 30 Days #90      New Diagnostics      * Chest W/O Cont CT, 9 Months       Dx: Mild persistent asthma - J45.30      ASSESSMENT:       1.  History of  recurrent bronchitis.      2.  Sj gren's syndrome.      3.  Cystic bronchiectasis.        4.  Obstructive sleep apnea on nightly CPAP.      5. Gastroesophageal reflux disease.      6. Seasonal allergies.      7.  Atelectasis.            PLAN:      1.  I have reviewed the patient's most recent CT scan with her which was done     on 06/11/2018.  This shows stable scarring with blebs and pleural thickening in     her right upper lobe.  This is where she has had previous pleurodesis.  There     is no nodules or evidence of bronchitis in her right and left lungs. I will     have her get a repeat chest CT in 9-12 months and follow up with Dr. Cortes     again at that time.      2.  As she does not feel that the Ventolin rescue inhaler is helping her much,     I will discontinue that and try her on Xopenex instead. I have instructed her     to use that q 4-6 hours for coughing, wheezing or shortness of breath.       3.  I will continue her on Singulair for her seasonal allergies.        4. I will request compliance records for her CPAP usage and will review those     when they are available.  The patient tells me that she typically uses it 8     hours or more per night.  We will begin following her obstructive sleep apnea     here.      5. She will follow up in 9-12 months, sooner if needed with Dr. Cortes.            Patient Education      Patient Education Provided:  How to use an Inhaler      Time Spent:  > 50% /[facility]                 Disclaimer: Converted document may not contain table formatting or lab diagrams. Please see [employer] [facility] for the authenticated document.

## 2021-05-28 NOTE — PROGRESS NOTES
Patient: TIFFANIE SÁNCHEZ     Acct: [id number]     Report: #[id number]  UNIT #: [id number]     : 1950    Encounter Date:2020  PRIMARY CARE: WAI IRAHETA  ***Signed***  --------------------------------------------------------------------------------------------------------------------  Chief Complaint      Encounter Date      2020            Primary Care Provider      WAI IRAHETA            Referring Provider      WAI IRAHETA            Patient Complaint      Patient is complaining of      Patient here today for 9 month f/u, SjÃ¶gren's syndrome with cystic     bronchiectasis            VITALS      Height 64 in / 162.56 cm      Weight 189 lbs  / 85.018106 kg      BSA 1.91 m2      BMI 32.4 kg/m2      Temperature 98 F / 36.67 C - Oral      Pulse 96      Respirations 14      Blood Pressure 175/68 Sitting, Left Arm      Pulse Oximetry 98%, room air            HPI      The patient is a 69 year old white female patient of Dr. Cortes's last seen by     him in 2019.  She has a history of bronchiectasis and scleroderma as well     as Sjogren's disease. She follows with Dr. Bhagat for this. She has not had     any breathing issues since her last office visit and denies dyspnea, coughing or    wheezing, hemoptysis, fever or chills. She has had chest pain and issues with     her blood pressure recently. Her primary care provider Dr. Iraheta just actually a    djusted her blood pressure medication and increased her Cardizem dose less than     1 week ago. Her blood pressure remains elevated today but she is checking it at     home and apparently following closely with Dr. Iraheta for this. She was also     referred to cardiology and has seen Dr. Goodwin and apparently they wanted to do a     left heart catheterization and there was an issues with getting this approved     which seems surprising to me given her ongoing chest pain. She denies denies     palpitations but has had off and on chest pains  longstanding, sometimes they     wake her up from sleep, sometimes they are like a heaviness or pressure lasting     for different amounts of time. She has not gone to the ER for these symptoms     before. She has been complaining of a lot of postnasal drip that is not     controlled despite taking Singulair. She sometimes uses flonase but very     sporadically and not as prescribed.             I reviewed the Review of Systems, medical, surgical and family history and agree    with those as entered.      Tobacco      Counseling given:  Patient declined      Copies To:   Nelson Cortes      Constitutional:  Denies: Fatigue, Fever, Weight gain, Weight loss, Chills,     Insomnia, Other      Respiratory/Breathing:  Complains of: Shortness of air; Denies: Wheezing, Cough,    Hemoptysis, Pleuritic pain, Other      Endocrine:  Denies: Polydipsia, Polyuria, Heat/cold intolerance, Abnorml     menstrual pattern, Diabetes, Other      Eyes:  Denies: Blurred vision, Vision Changes, Other      Ears, nose, mouth, throat:  Complains of: Other (trouble swallowing ); Denies:     Mouth lesions, Thrush, Throat pain, Hoarseness, Allergies/Hay Fever, Post Nasal     Drip, Headaches, Recent Head Injury, Nose Bleeding, Neck Stiffness, Thyroid     Mass, Hearing Loss, Ear Fullness, Dry Mouth, Nasal or Sinus Pain, Dry Lips,     Nasal discharge, Nasal congestion      Cardiovascular:  Complains of: Chest Pain (sees Cardiology ); Denies: Palpitati    ons, Syncope, Claudication, Wake up Gasping for air, Leg Swelling, Irregular     Heart Rate, Cyanosis, Dyspnea on Exertion, Other      Gastrointestinal:  Complains of: Constipation; Denies: Nausea, Diarrhea,     Abdominal pain, Vomiting, Difficulty Swallowing, Reflux/Heartburn, Dysphagia,     Jaundice, Bloating, Melena, Bloody stools, Other      Genitourinary:  Denies: Urinary frequency, Incontinence, Hematuria, Urgency,     Nocturia, Dysuria, Testicular problems, Other       Musculoskeletal:  Denies: Joint Pain, Joint Stiffness, Joint Swelling, Myalgias,    Other      Hematologic/lymphatic:  DENIES: Lymphadenopathy, Bruising, Bleeding tendencies,     Other      Neurological:  Denies: Headache, Numbness, Weakness, Seizures, Other      Psychiatric:  Denies: Anxiety, Appropriate Effect, Depression, Other      Sleep:  No: Excessive daytime sleep, Morning Headache?, Snoring, Insomnia?, Stop    breathing at sleep?, Other      Integumentary:  Denies: Rash, Dry skin, Skin Warm to Touch, Other      Immunologic/Allergic:  Denies: Latex allergy, Seasonal allergies, Asthma,     Urticaria, Eczema, Other      Immunization status:  No: Up to date            FAMILY/SOCIAL/MEDICAL HX      Surgical History:  No: AAA Repair, Abdominal Surgery, Angioplasty, Appendectomy,    Back Surgery, Bladder Surgery, Bowel Surgery, Breast Surgery, CABG, Carotid     Stenosis, Cholecystectomy, Ear Surgery, Eye Surgery, Head Surgery, Hernia     Surgery, Kidney Surgery, Nose Surgery, Oral Surgery, Orthopedic Surgery,     Prostatectomy, Rectal Surgery, Spinal Surgery, Testicular Surgery, Throat     Surgery, Valve Replacement, Vascular Surgery, Other Surgeries      Stroke - Family Hx:  Grandparent      Heart - Family Hx:  Mother, Father      Diabetes - Family Hx:  Uncle      Cancer/Type - Family Hx:  Grandparent      Other Family Medical History:  Father (IPF)      Is Father Still Living?:  No      Is Mother Still Living?:  Yes       Family History:  Yes      Social History:  No Tobacco Use, No Alcohol Use, No Recreational Drug use      Smoking status:  Former smoker (1 ppd for 18 years quit 1981)      Smoking history:  10-25 pack years      Counseling given:  Patient declined      Exercise Activity:  Walking      How Often:  3-4 times      Occupation:  RETIRED       [employer]bie/Social Activities:  READING      Whom do you live with?:        Release Medical Information to:  Other (HUSAND AND SON)       Number of Pregnancies:  1      Number of Delivers:  1      Number of Abortions:  No      Anticoagulation Therapy:  No      Antibiotic Prophylaxis:  No      Medical History:  Yes: Allergies, Asthma, Anxiety, High Blood Pressure, High     Cholesterol; No: Alcoholism, Anemia, Arthritis, Blood Disease, Broken Bones,     Cataracts, Chemical Dependency, Chemotherapy/Cancer, Chronic Bronchitis/COPD,     Emphysema, Chronic Liver Disease, Colon Trouble, Colitis, Diverticulitis,     Congestive Heart Failu, Deafness or Ringing Ears, Convulsions, Depression,     Bipolar Disorder, Diabetes, Epilepsy, Seizures, Forgetfullness, Glaucoma, Gall     Stones, Gout, Head Injury, Heart Attack, Heart Murmur, Hemorrhoids/Rectal Prob,     Hepatitis, Hiatal Hernia, HIV (Do not ask - volu, Jaundice, Kidney or Bladder Di    sease, Kidney Stones, Migrane Headaches, Mitral Valve Prolapse, Night sweats,     Phlebitis, Psychiatric Care, Reflux Disease, Rheumatic Fever, Sexually     Transmitted Dis, Shortness Of Breath, Sinus Trouble, Skin Disease/Psoriais/Ecz,     Stroke, Thyroid Problem, Tuberculosis or Pos TB Te, Miscellaneous Medical/oth      Psychiatric History      anxiety            PREVENTION      Hx Influenza Vaccination:  Yes      Date Influenza Vaccine Given:  Oct 1, 2019      Influenza Vaccine Declined:  No      2 or More Falls Past Year?:  No      Fall Past Year with Injury?:  No      Hx Pneumococcal Vaccination:  Yes      Encouraged to follow-up with:  PCP regarding preventative exams.      Chart initiated by      Anna Burton MA            ALLERGIES/MEDICATIONS      Allergies:        Coded Allergies:             PENICILLINS (Verified  Adverse Reaction, Intermediate, RASH, 1/20/20)           LATEX (Verified  Adverse Reaction, Mild, RASH, 1/20/20)      Medications    Last Reconciled on 1/20/20 14:14 by PHIL JACOB      dilTIAZem HCL (Cardizem) 30 Mg Tab      30 MG PO TID, #90 TAB         Reported         1/20/20        Ergocalciferol (Vitamin D2) (Vitamin D2) 50,000 Unit Capsule      74960 UNITS PO MO, #8 CAP         Reported         1/20/20       Hctz (hydroCHLOROthiazide) 12.5 Mg Capsule      12.5 MG PO QDAY, #30 CAP 0 Refills         Reported         1/20/20       Atorvastatin (Atorvastatin) 20 Mg Tablet      20 MG PO HS, #30 TAB 0 Refills         Reported         1/20/20       Carvedilol (Carvedilol) 3.125 Mg Tablet      3.125 MG PO BID, #60 TAB 0 Refills         Reported         1/20/20       (trubiotics)   No Conflict Check               Reported         3/28/19       Levalbuterol Tartrate (Xopenex HFA) 15 Gm Hfa.aer.ad      2 PUFFS INH RTQ4H PRN for SHORTNESS OF BREATH, #1 INH 4 Refills         Prov: Trish Smith PA-C         6/19/18       Montelukast Sodium (Singulair*) 10 Mg Tab      10 MG PO HS, #90 TAB 3 Refills         Prov: Trish Smith PA-C         6/19/18       Cyanocobalamin (Vitamin B-12*) 100 Mcg Tablet      2500 MCG PO QDAY, TAB         Reported         6/19/18       Nitroglycerin (Nitrostat*) 0.4 Mg Tablet      0.4 MG SL ASDIR, #25 TAB 1 Refill         Reported         6/19/18       Cranberry Fruit (Cranberry) 400 Mg Tablet      4200 MG PO QDAY for 30 Days, #315 TAB         Reported         6/19/18       Magnesium Amino Acid Chelate (Magnesium*) 100 Mg Tablet      400 MG PO QDAY, TAB         Reported         6/19/18       Turmeric Root Extract (Turmeric) 1 Each Capsule      500 MG PO, CAP         Reported         6/19/18       Losartan Potassium (Losartan*) 25 Mg Tablet      50 MG PO BID, #120 TAB 0 Refills         Reported         6/19/18       Ergocalciferol (Ergocalciferol*) 50,000 Unit Capsule      70575 UNITS PO WE@09, #4 CAP 0 Refills         Reported         8/4/17      Current Medications      Current Medications Reviewed 1/20/20            EXAM      CONSTITUTIONAL: Pleasant  normal conversant.       EYES : Pink conjunctive, no ptosis, PERRL.       ENMT : Nose and ears appear normal, normal  dentition, mild posterior pharyngeal     wall erythema, no sinus tenderness. Mallampati classification       Neck: Nontender, no masses, no thyromegaly, no nodules.      Resp : Grossly clear to auscultation, no wheezes, rhonchi or crackles     appreciated, normal work of breathing noted.        CVS  : No carotid bruits, s1s2 nl, RRR, no murmur, rubs or gallop, no peripheral     edema       Chest wall: Normal rise with inspiration, nontender on palpation.      GI   : Abdomen soft, with no masses, no hepatosplenomegaly, no hernias, BS+      MSK  : Normal gait and station, no digital cyanosis or clubbing       Skin : No rashes, ulcerations or lesions, normal turgor and temperature      Neuro: CN II - XII intact, no sensory deficits, DTRs intact and symmetrical, no     motor weakness      Psych: Appropriate affect, A   Vtials      Vitals:             Height 64 in / 162.56 cm           Weight 189 lbs  / 85.713764 kg           BSA 1.91 m2           BMI 32.4 kg/m2           Temperature 98 F / 36.67 C - Oral           Pulse 96           Respirations 14           Blood Pressure 175/68 Sitting, Left Arm           Pulse Oximetry 98%, room air            REVIEW      Results Reviewed      PCCS Results Reviewed?:  Yes Prev Lab Results, Yes Prev Radiology Results, Yes     Previous Mecial Records            Assessment      Bronchiectasis - J47.9            Notes      New Medications      * Carvedilol 3.125 MG TABLET: 3.125 MG PO BID #60      * Atorvastatin 20 MG TABLET: 20 MG PO HS #30      * HCTZ (hydroCHLOROthiazide) 12.5 MG CAPSULE: 12.5 MG PO QDAY #30      * Ergocalciferol (Vitamin D2) (Vitamin D2) 50,000 UNIT CAPSULE: 50,000 UNITS PO       MO #8      * dilTIAZem HCL (Cardizem) 30 MG TAB: 30 MG PO TID #90      * Elpidio-Fluticasone (Fluticasone 50 mcg) 16 GM SPRAY.SUSP: 1 PUFFS NARE EACH QDAY       #1         Dx: Bronchiectasis - J47.9      Renewed Medications      * Montelukast Sodium (Singulair*) 10 MG TAB: 10 MG PO HS #90      *  Levalbuterol Tartrate (Xopenex HFA) 15 GM HFA.AER.AD: 2 PUFFS INH RTQ4H PRN       SHORTNESS OF BREATH #1      New Diagnostics      * Chest W/O Cont CT, 1 DAY         Dx: Bronchiectasis - J47.9      ASSESSMENT:       1.  SjÃ¶gren's syndrome with cystic bronchiectasis.       2. Obstructive sleep apnea on nightly CPAP followed by Dr. Ag.       3. History of Raynaud's Phenomenon.       4. History of esophageal dysmotility followed by Dr. Kang.       5. Recent chest pain followed by Dr. Goodwin.       6. Uncontrolled hypertension being followed by her primary care provider Dr. Iraheta.       7. Postnasal drip currently uncontrolled.             PLAN:      1. I have discussed with the patient regarding her current symptoms and plans     going forward.       2. Some of her other specialists have questioned wither her symptoms are     pulmonary in nature, I will repeat a CT scan of the chest now to ensure there     are no changes however her symptoms do not seem to be pulmonary in nature at     this time as they are primarily are just chest pain and no dyspnea, cough or wh    eezing. Continue to use Xopenex as needed.       3. I discussed she may have the CT scan of the chest now or anytime before March     making it 1 year since her last CT scan was done.       4. Continue keeping a blood pressure log at home. She has an automatic blood pre    ssure cuff and she should follow up with Dr. Iraheta as soon as possible if her     blood pressure remains above 140/90. She just had her Cardizem dose increased     less than 1 week ago.       5. I recommend that she continue to follow up closely with Dr. Goodwin and proceed     with left heart catheterization for definitive evaluation of her chest pain.       6. I instructed the patient to go to the ER or call 911 for any chest pain,     pressure, heaviness or pressure to rule out myocardial infarction. The patient     verbalized understanding.       7. Continue to follow up with  Dr. Ag for management of her CPAP.       8. Continue to follow up with Dr. Bhagat for her history of Sjogren's disease.           9. I encouraged her to keep herself as active as possible and work on deep     breathing.       10. Continue Singulair for seasonal allergies. I will start flonase given her     given her ongoing and bothersome postnasal drip.       11. She is up to date on flu and pneumonia vaccines.      12. Follow up in 9-12 months with Dr. Cortes, sooner if needed.            Patient Education      Patient Education Provided:  How to use an Inhaler, Sleep Apnea      Time Spent:  > 50% /[facility]            Electronically signed by JUAN HANSEN PA-C  01/21/2020 13:28       Disclaimer: Converted document may not contain table formatting or lab diagrams. Please see [employer] for the authenticated document.

## 2021-05-28 NOTE — PROGRESS NOTES
Patient: TIFFANIE SÁNCHEZ     Acct: [id number]     Report: #[id number]  UNIT #: [id number]     : 1950    Encounter Date:2019  PRIMARY CARE: WAI ROMERO  ***Signed***  --------------------------------------------------------------------------------------------------------------------  Chief Complaint      Encounter Date      Mar 28, 2019            Primary Care Provider      WAI ROMERO            Referring Provider      WAI ROMERO            Patient Complaint      Patient is complaining of      9 month follow up/soa            VITALS      Height 5 ft 4 in / 162.56 cm      Weight 188 lbs 4 oz / 85.841587 kg      BSA 1.91 m2      BMI 32.3 kg/m2      Temperature 98.5 F / 36.94 C - Oral      Pulse 72      Respirations 16      Blood Pressure 162/66 Sitting, Right Arm      Pulse Oximetry 96%, room air            HPI      The patient is a 69 year old female with history of scleroderma and cystic     bronchiectasis. She is here for follow up.             Since her last office visit she has been doing well. She does not use any     inhalers now. She has seasonal allergies and takes Singular. She recently had a     CT scan of the chest which was reviewed with her today. She has Xopenex at home     but hardly ever uses it. She was complaining of right sided earache that comes     and goes but she thinks it is because she chews on the right side of her mouth     because of her problems breathing. She has no fever or chills, no nausea or     vomiting. She has acid reflux and has been on Diltazem. She has Raynaud's p    henomenon and it helps her. Dr. Kang has been following with her in the past.     She is not on any medications. She has been following Dr. Bhagat for her     SjÃ¶gren's syndrome.      Tobacco      Counseling given:  Patient declined            ROS      Constitutional:  Complains of: Fatigue; Denies: Fever, Weight gain, Weight loss,    Chills, Insomnia, Other      Respiratory/Breathing:   Complains of: Shortness of air, Wheezing; Denies: Cough,    Hemoptysis, Pleuritic pain, Other      Endocrine:  Denies: Polydipsia, Polyuria, Heat/cold intolerance, Abnorml     menstrual pattern, Diabetes, Other      Eyes:  Denies: Blurred vision, Vision Changes, Other      Ears, nose, mouth, throat:  Denies: Mouth lesions, Thrush, Throat pain,     Hoarseness, Allergies/Hay Fever, Post Nasal Drip, Headaches, Recent Head Injury,    Nose Bleeding, Neck Stiffness, Thyroid Mass, Hearing Loss, Ear Fullness, Dry     Mouth, Nasal or Sinus Pain, Dry Lips, Nasal discharge, Nasal congestion, Other      Cardiovascular:  Denies: Palpitations, Syncope, Claudication, Chest Pain, Wake     up Gasping for air, Leg Swelling, Irregular Heart Rate, Cyanosis, Dyspnea on     Exertion, Other      Gastrointestinal:  Denies: Nausea, Constipation, Diarrhea, Abdominal pain,     Vomiting, Difficulty Swallowing, Reflux/Heartburn, Dysphagia, Jaundice,     Bloating, Melena, Bloody stools, Other      Genitourinary:  Denies: Urinary frequency, Incontinence, Hematuria, Urgency,     Nocturia, Dysuria, Testicular problems, Other      Musculoskeletal:  Denies: Joint Pain, Joint Stiffness, Joint Swelling, Myalgias,    Other      Hematologic/lymphatic:  DENIES: Lymphadenopathy, Bruising, Bleeding tendencies,     Other      Neurological:  Denies: Headache, Numbness, Weakness, Seizures, Other      Psychiatric:  Denies: Anxiety, Appropriate Effect, Depression, Other      Sleep:  No: Excessive daytime sleep, Morning Headache?, Snoring, Insomnia?, Stop    breathing at sleep?, Other      Integumentary:  Denies: Rash, Dry skin, Skin Warm to Touch, Other      Immunologic/Allergic:  Denies: Latex allergy, Seasonal allergies, Asthma,     Urticaria, Eczema, Other      Immunization status:  No: Up to date            FAMILY/SOCIAL/MEDICAL HX      Surgical History:  No: AAA Repair, Abdominal Surgery, Angioplasty, Appendectomy,    Back Surgery, Bladder Surgery, Bowel  Surgery, Breast Surgery, CABG, Carotid     Stenosis, Cholecystectomy, Ear Surgery, Eye Surgery, Head Surgery, Hernia Hirsch    rgery, Kidney Surgery, Nose Surgery, Oral Surgery, Orthopedic Surgery,     Prostatectomy, Rectal Surgery, Spinal Surgery, Testicular Surgery, Throat     Surgery, Valve Replacement, Vascular Surgery, Other Surgeries      Stroke - Family Hx:  Grandparent      Heart - Family Hx:  Mother, Father      Diabetes - Family Hx:  Uncle      Cancer/Type - Family Hx:  Grandparent      Other Family Medical History:  Father (IPF)      Is Father Still Living?:  No      Is Mother Still Living?:  Yes       Family History:  Yes      Social History:  No Tobacco Use, No Alcohol Use, No Recreational Drug use      Smoking status:  Former smoker (1 ppd for 18 years quit 1981)      Smoking history:  10-25 pack years      Counseling given:  Patient declined      Exercise Activity:  Walking      How Often:  3-4 times      Occupation:  RETIRED       Hobbie/Social Activities:  READING      Whom do you live with?:        Release Medical Information to:  Other (HUSAND AND SON)      Number of Pregnancies:  1      Number of Delivers:  1      Number of Abortions:  No      Anticoagulation Therapy:  No      Antibiotic Prophylaxis:  No      Medical History:  Yes: Allergies, Asthma, Anxiety, High Blood Pressure; No:     Alcoholism, Anemia, Arthritis, Blood Disease, Broken Bones, Cataracts, Chemical     Dependency, Chemotherapy/Cancer, Chronic Bronchitis/COPD, Emphysema, Chronic     Liver Disease, Colon Trouble, Colitis, Diverticulitis, Congestive Heart Failu,     Deafness or Ringing Ears, Convulsions, Depression, Bipolar Disorder, Diabetes,     Epilepsy, Seizures, Forgetfullness, Glaucoma, Gall Stones, Gout, Head Injury,     Heart Attack, Heart Murmur, Hemorrhoids/Rectal Prob, Hepatitis, Hiatal Hernia,     High Cholesterol, HIV (Do not ask - volu, Jaundice, Kidney or Bladder Disease,     Kidney Stones, Migrane  Headaches, Mitral Valve Prolapse, Night sweats,     Phlebitis, Psychiatric Care, Reflux Disease, Rheumatic Fever, Sexually     Transmitted Dis, Shortness Of Breath, Sinus Trouble, Skin Disease/Psoriais/Ecz,     Stroke, Thyroid Problem, Tuberculosis or Pos TB Te, Miscellaneous Medical/oth      Psychiatric History      anxiety            PREVENTION      Hx Influenza Vaccination:  Yes      Date Influenza Vaccine Given:  Oct 1, 2018      Influenza Vaccine Declined:  Yes      2 or More Falls Past Year?:  No      Fall Past Year with Injury?:  No      Hx Pneumococcal Vaccination:  Yes      Encouraged to follow-up with:  PCP regarding preventative exams.      Chart initiated by      maurilio camargo ma            ALLERGIES/MEDICATIONS      Allergies:        Coded Allergies:             PENICILLINS (Verified  Adverse Reaction, Intermediate, RASH, 3/28/19)           LATEX (Verified  Adverse Reaction, Mild, RASH, 3/28/19)      Medications    Last Reconciled on 3/28/19 10:59 by LUCIO LOZANO MD      (trubiotics)   No Conflict Check               Reported         3/28/19       Levalbuterol Tartrate (Xopenex HFA) 15 Gm Hfa.aer.ad      2 PUFFS INH RTQ4H PRN for SHORTNESS OF BREATH, #1 INH 4 Refills         Prov: Trish Smith PA-C         6/19/18       Montelukast Sodium (Singulair*) 10 Mg Tab      10 MG PO HS, #90 TAB 3 Refills         Prov: Trish Smith PA-C         6/19/18       Cyanocobalamin (Vitamin B-12*) 100 Mcg Tablet      2500 MCG PO QDAY, TAB         Reported         6/19/18       Nitroglycerin (Nitrostat*) 0.4 Mg Tablet      0.4 MG SL ASDIR, #25 TAB 1 Refill         Reported         6/19/18       Cranberry Fruit (Cranberry) 400 Mg Tablet      4200 MG PO QDAY for 30 Days, #315 TAB         Reported         6/19/18       Magnesium Amino Acid Chelate (Magnesium*) 100 Mg Tablet      400 MG PO QDAY, TAB         Reported         6/19/18       Turmeric Root Extract (Turmeric) 1 Each Capsule      500 MG PO, CAP          Reported         6/19/18       Losartan Potassium (Losartan*) 25 Mg Tablet      50 MG PO BID, #120 TAB 0 Refills         Reported         6/19/18       Diltiazem (Diltiazem) 60 Mg Tab      30 MG PO TID, TAB         Reported         6/19/18       Aspirin Chew (Aspirin Chew) 81 Mg Tab.chew      81 MG PO BID, #30 TAB.CHEW 0 Refills         Reported         10/12/17       Ergocalciferol (Ergocalciferol*) 50,000 Unit Capsule      71048 UNITS PO WE@09, #4 CAP 0 Refills         Reported         8/4/17       Hydrochlorothiazide (Hydrochlorothiazide*) 12.5 Mg Capsule      25 MG PO QDAY, #60 CAP 0 Refills         Reported         8/4/17      Current Medications      Current Medications Reviewed 3/28/19            EXAM      CONSTITUTIONAL: Pleasant female in no acute distress, normal conversant.       EYES : Pink conjunctive, no ptosis, PERRL.       ENMT : Nose and ears appear normal, normal dentition, mild posterior pharyngeal     wall erythema, dry mucous membranes, no sinus tenderness. Mallampati     classification 3.        Neck: Nontender, no masses, no thyromegaly, no nodules.      Resp : Normal resp effort, bilateral diminished breath sounds, no wheezing, cra    ckles or rhonchi.  Resonant to percussion bilaterally.      CVS  : No carotid bruits, s1s2 nl, RRR, no murmur, rubs or gallop, no peripheral     edema       Chest wall: Normal rise with inspiration, nontender on palpation.      GI   : Abdomen soft, with no masses, no hepatosplenomegaly, no hernias, BS+      MSK  : Normal gait and station, no digital cyanosis or clubbing       Skin : No rashes, ulcerations or lesions, normal turgor and temperature      Neuro: CN II - XII intact, no sensory deficits, DTRs intact and symmetrical, no     motor weakness      Psych: Appropriate affect, A   Vtials      Vitals:             Height 5 ft 4 in / 162.56 cm           Weight 188 lbs 4 oz / 85.100422 kg           BSA 1.91 m2           BMI 32.3 kg/m2           Temperature 98.5 F  / 36.94 C - Oral           Pulse 72           Respirations 16           Blood Pressure 162/66 Sitting, Right Arm           Pulse Oximetry 96%, room air            REVIEW      Results Reviewed      PCCS Results Reviewed?:  Yes Prev Lab Results, Yes Prev Radiology Results, Yes     Previous Mecial Records      Radiographic Results               [facility]                   [facility]                PACS RADIOLOGY REPORT            Patient: TIFFANIE SÁNCHEZ   Acct: #[id number]   Report: #[id number]            UNIT #: [id number]    DOS: 03/15/19 1300      INSURANCE:[employer] S   ORDER #:[id number]      LOCATION:KP     : 1950            PROVIDERS      ADMITTING:     ATTENDING: Trish Smith PA-C      FAMILY:  WAI ROMERO   ORDERING:  Trish Smith PA-C         OTHER:    DICTATING:  Phu Rudolph MD            REQ #:[id number]   EXAM:CHWO - CT CHEST without CONTRAST      REASON FOR EXAM:        REASON FOR VISIT:  MILD PERSIST ASTHMA            *******Signed******         PROCEDURE:   CT CHEST WITHOUT CONTRAST             COMPARISON:   [facility], CT, CHEST W/O CONTRAST, 2018,     11:07.             INDICATIONS:   SOA, F/U PREVIOUS SCAN             TECHNIQUE:   CT images were created without the administration of contrast     material.               PROTOCOL:     Standard imaging protocol performed                RADIATION:     DLP: 334.1mGy*cm          Automated exposure control was utilized to minimize radiation dose.              FINDINGS:         Lung window images reveal 3.5 cm area of consolidation in the posterior and     medial right upper       lobe, with extensive cylindrical bronchiectasis.  This is stable in comparison     to 2018.             Nodular pleural thickening is seen in the mid chest bilaterally, well seen on     series 204, image 27,       which also appears stable.             No pleural calcification is  evident.  No pleural fluid or pericardial fluid is     evident.             Mediastinal windows reveal no mediastinal, hilar, or axillary adenopathy.             Mild degenerative spurring is seen in the thoracic spine.             CONCLUSION:         CT scan of the chest without IV contrast demonstrating 3.5 cm consolidation in     the posterior and       medial right upper lobe with cylindrical bronchiectasis, stable.             Nodular pleural thickening is stable.              STARR MÉNDEZ MD             Electronically Signed and Approved By: STARR MÉNDEZ MD on 3/15/2019 at 15:56                            Until signed, this is an unconfirmed preliminary report that may contain      errors and is subject to change.                                              COUST:      D:03/15/19 1556      PFT Results      [id number]  [id number]                                 Psych[facility]                          [facility]                            [place], [place]  [address]               __________________________________________________________________________             Patient Name:                   Attending Physician:      Dk Zuniga M.D.             Patient Visit # MR #            Admit Date  Disch Date     Location      [id number]    [id number]      10/10/2017                 CT- -             Date of Birth      1950      __________________________________________________________________________      821 - DIAGNOSTIC REPORT             PULMONARY FUNCTION TEST             SPIROMETRY:      Spirometry is normal.      FEV1/FVC ratio 81%.      FEV1 is 2.72 L, 111% of predicted.      FVC is 3.35 L, 104% of predicted.      There is no response to bronchodilator administration.             LUNG VOLUMES:      Lung volumes are normal.      Total lung capacity is 5.35 L, 102% of predicted.      Residual volume is 2.0  L, 91% of predicted.             DIFFUSION:      Diffusion capacity is normal, 93% of predicted.             FLOW VOLUME LOOP:      Flow volume loop is normal.             CONCLUSION:      Normal spirometry with normal lung volumes and normal diffusion capacity.      Normal study.  Please correlate clinically.             To be electronically signed in [facility]      [id number] NELSON CORTES M.D.             NK:aw      D:  10/13/2017 17:11      T:  10/13/2017 18:04      #[id number]             Until signed, this is an unconfirmed preliminary report that may contain      errors and is subject to change.                   10/16/17 0843  <Electronically signed by Nelson Cortes MD>            Assessment      Notes      New Medications      * (trubiotics):       Changed Medications      * Cyanocobalamin (Vitamin B-12*) 100 MCG TABLET: 2,500 MCG PO QDAY      PLAN:      The patient is a 69 year old female with history of recurrent bronchitis,     SjÃ¶gren' s syndrome, cystic bronchiectasis, obstructive sleep apnea on nightly     CPAP, GERD, seasonal allergies and atelectasis.             1.  SjÃ¶gren's syndrome with cystic bronchiectasis.  CT scan is stable and her     symptoms are stable. Use Xopenex as needed.       2. She should keep herself as active as possible. Deep breathing exercises would     help.       3. Continue Diltazem for esophageal dysmotility as well as Raynaud's phenomenon.           4. Continue follow up with Dr Bhagat.      5. Continue with Dr. Ag for  CPAP.      6. Continue Singular for seasonal allergies.      7. I  will follow up with her  in 9-12 months, sooner if needed.            Patient Education      Education resources provided:  Yes      Patient Education Provided:  Acute Bronchitis                 Disclaimer: Converted document may not contain table formatting or lab diagrams. Please see [facility] for the authenticated document.

## 2021-06-05 NOTE — PROGRESS NOTES
"   Progress Note      Patient Name: Dk Zuniga   Patient ID: [id number]   Sex: Female   YOB: 1950    Primary Care Provider: Haritha Iraheta MD   Referring Provider: Susan CRAWFORD    Visit Date: May 27, 2021    Provider: Leon Goodwin MD   Location: [facility]   Location Address: [address] [place] [address], [address]   [place], [place]  [id number]   Location Phone: [phone]          Chief Complaint     Chest pain and hypertension.       History Of Present Illness  REFERRING CARE PROVIDER: Haritha Iraheta MD   Dk Zuniga is a 71 year old /White female with a previous history of hypertension, scleroderma, obstructive sleep apnea, and bronchiectasis who has been having some intermittent chest discomfort, sharp, with radiation to her jaw and arm area, lasting for about 5 minutes, not exertional in nature.   PAST MEDICAL HISTORY: Significant for hypertension, Raynaud's scleroderma, obstructive sleep apnea for which she wears a CPAP and bronchiectasis.   PSYCHOSOCIAL HISTORY: Rarely consumes alcohol. Previously smoked, but quit.   CURRENT MEDICATIONS: Diltiazem 30 mg 2 tabs t.i.d.; losartan 50 mg b.i.d.; hydrochlorothiazide 12.5 mg daily; atorvastatin 20 mg daily; Linzess 72 mcg daily; vitamin D weekly; allergy shots once a week; Omega-3 fish oil 500 mg daily; B12 1000 mg daily; tumeric; magnesium 400 mg daily; CoQ10 100 mg daily.      ALLERGIES:  Penicillin; Effexor; Tetanus vaccine; Latex.       Review of Systems  · Cardiovascular  o Admits  o : palpitations (fast, fluttering, or skipping beats), swelling (feet, ankles, hands), shortness of breath while walking or lying flat, chest pain or angina pectoris   · Respiratory  o Admits  o : chronic or frequent cough      Vitals  Date Time BP Position Site L\R Cuff Size HR RR TEMP (F) WT  HT  BMI kg/m2 BSA m2 O2 Sat FR L/min FiO2 HC       05/27/2021 01:51 /92 Sitting    76 - R   178lbs 0oz 5'  5\" 29.62 1.92       05/27/2021 01:51 /96 " Sitting    76 - R                   Physical Examination  · Constitutional  o Appearance  o : Awake, alert, in no acute distress.   · Eyes  o Conjunctivae  o : Normal.  · Ears, Nose, Mouth and Throat  o Oral Cavity  o :   § Oral Mucosa  § : Normal.  · Neck  o Inspection/Palpation  o : No JVD. Good carotid upstroke. No thyromegaly.  · Respiratory  o Respiratory  o : Good respiratory effort. Clear to percussion and auscultation.  · Cardiovascular  o Heart  o :   § Auscultation of Heart  § : S1, S2 normal. Regular rate and rhythm without murmurs, gallops, or rubs.  o Peripheral Vascular System  o :   § Extremities  § : Good femoral and pedal pulses. No pedal edema.  · Gastrointestinal  o Abdominal Examination  o : Soft. No tenderness or masses felt. No hepatosplenomegaly. Abdominal aorta is not palpable.  · EKG  o EKG  o : Performed in the office today.   o Indications  o : Chest pain.  o Results  o : Normal sinus rhythm.   o Comparison  o : No change from prior EKGs.          Assessment     ASSESSMENT & PLAN:     1.  Chest pain, atypical in nature.  Recommend a noninvasive nuclear stress test given her previous risk factors        of hypertension, dyslipidemia, and rheumatologic conditions.  In addition, we will get an echocardiogram to        evaluate for any evidence of pulmonary hypertension given her sleep apnea and rheumatologic issues as        well.   2.  Hypertension, not ideally controlled.  Will increase her hydrochlorothiazide to 25 mg daily and plan on        repeating a BMP in 2 weeks.        Leon Goodwin MD  JH/rt                Electronically Signed by: Lilian Dao-, Other -Author on May 30, 2021 08:10:22 PM  Electronically Co-signed by: Leon Goodwin MD -Reviewer on June 1, 2021 01:58:45 PM

## 2021-06-05 NOTE — PROGRESS NOTES
Progress Note      Patient Name: Dk Zuniga   Patient ID: [id number]   Sex: Female   YOB: 1950    Primary Care Provider: Haritha Iraheta MD   Referring Provider: Susan CRAWFORD    Visit Date: May 5, 2021    Provider: TY Nagel   Location: [facility] [address]   Location Address: [address] [place] [address]  [id number]   Location Phone: [phone]          Chief Complaint  · 4 month F/U dysphagia      History Of Present Illness  Dk Zuniga is a 71 year old /White female who presents to the office today.      Pt initially seen in March 2020 for dysphagia and some constipation. Pt did not do scopes that were ordered d/t caregiving for mother and COVID. Patient thinks last colonoscopy about 10 years ago.  Patient follows with Dr. Cortes, diagnosed with possible pulmonary fibrosis per patient.  History of being seen here in 2016 due to esophageal spasm but was doing well on Cardizem.    Pt last seen 1/2021 w worsening constipation. Pt requested to do scopes in summer, wanted to get vaccine first.  Reflux better w stopping ETOH.   Linzess was Rx'd last visit and pt states it works great but does take a while in AM to get through BM's, usually no diarrhea. Dysphagia better, but still occurs w chicken or water sometimes, will feel food lodge in neck, states cannot swallow when occurs. Sx q3 days. +Sjogrens.   Did get COVID vaccine.  Pt reports she was supposed to have f/u w Dr Goodwin and she did not d/t COVID       Past Medical History  Asthma; Dysphagia; Essential hypertension; Family history of colon cancer; Fibrosis lung; GERD (gastroesophageal reflux disease); High cholesterol; Lung disease; Sjogren's disease; Sleep apnea; Spontaneous pneumothorax         Past Surgical History  Appendectomy; Colonoscopy; EGD; Gallbladder; Hysterectomy         Medication List  Name Date Started Instructions   albuterol sulfate 90 mcg/actuation inhalation HFA aerosol inhaler   inhale 1 puff (90 mcg) by inhalation route every 6 hours as needed   atorvastatin 20 mg oral tablet  take 1 tablet (20 mg) by oral route once daily   baclofen 10 mg oral tablet  take 1 tablet (10 mg) by oral route 3 times per day   Benadryl 25 mg oral capsule  take 1 capsule (25 mg) by oral route 3 times per day   Bystolic 5 mg oral tablet  take 1 tablet (5 mg) by oral route once daily   Co Q-10 100 mg oral capsule  take 1 capsule by oral route daily   diltiazem HCl 30 mg oral tablet  take 1 tablet (30 mg) by oral route 3 times per day   elderberry fruit and flower 460-115 mg oral capsule  take 1 capsule by oral route daily   EpiPen 0.3 mg/0.3 mL injection auto-injector  inject 0.3 milliliter (0.3 mg) by intramuscular route once as needed for anaphylaxis   Fish Oil 1,000 mg (120 mg-180 mg) oral capsule  take 1 capsule by oral route daily   flaxseed oil 1,000 mg oral capsule  take 1 capsule by oral route daily   fluticasone propionate 50 mcg/actuation nasal spray,suspension  spray 1 spray (50 mcg) in each nostril by intranasal route once daily   hydrochlorothiazide 12.5 mg oral capsule  take 1 capsule (12.5 mg) by oral route once daily   Linzess 72 mcg oral capsule 01/05/2021 take 1 capsule by oral route daily for 30 days take 30 min before meal   losartan 50 mg oral tablet  take 1 tablet (50 mg) by oral route 2 times per day   magnesium 200 mg oral tablet  take 3 tablets by oral route daily   montelukast 10 mg oral tablet  take 1 tablet (10 mg) by oral route once daily in the evening   nitroglycerin oral  take 1 by oral route daily   pantoprazole 40 mg oral tablet,delayed release (DR/EC)  take 1 tablet (40 mg) by oral route once daily   Pepcid 40 mg oral tablet  take 1 tablet (40 mg) by oral route once daily at bedtime   turmeric 400 mg oral capsule  take 1 capsule by oral route daily   Ventolin HFA 90 mcg/actuation inhalation HFA aerosol inhaler  inhale 1 puff (90 mcg) by inhalation route every 6 hours as needed  "  Vitamin B-12 1,000 mcg oral tablet  take 1 tablet by oral route daily   Vitamin D2 1,250 mcg (50,000 unit) oral capsule  take 1 capsule by oral route   zinc 50 mg oral tablet  take 1 tablet by oral route daily   Zofran 4 mg oral tablet  take 2 tablets (8 mg) by oral route 1-2 hours prior to radiation therapy         Allergy List  Latex; PENICILLINS       Allergies Reconciled  Family Medical History  Family history of colon cancer; Family history of heart disease         Social History  Alcohol (Current some day); lives with spouse; ; Retired; Tobacco (Former)         Review of Systems  · Constitutional  o Admits  o : good general health lately, no acute distress  · Gastrointestinal  o Denies  o : additional gastrointestinal symptoms except as noted in the HPI  · Psychiatric  o Admits  o : pleasant affect      Vitals  Date Time BP Position Site L\R Cuff Size HR RR TEMP (F) WT  HT  BMI kg/m2 BSA m2 O2 Sat FR L/min FiO2 HC       01/05/2021 01:30 /93 Sitting    81 - R  98 181lbs 6oz 5'  4\" 31.13 1.93       05/05/2021 02:11 /57 Sitting    66 - R  98.6 179lbs 8oz 5'  4\" 30.81 1.92             Physical Examination  · Constitutional  o Appearance  o : well developed, well-nourished, in no acute distress  · Head and Face  o Head  o :   § Inspection  § : atraumatic, normocephalic  · Eyes  o Sclerae  o : sclerae white, no sclerae icterus  · Neck  o Inspection/Palpation  o : supple  · Respiratory  o Respiratory Effort  o : breathing unlabored  o Inspection of Chest  o : normal appearance, no retractions  · Cardiovascular  o Peripheral Vascular System  o :   § Extremities  § : no cyanosis, clubbing or edema  · Gastrointestinal  o Abdominal Examination  o : soft, nontender to palpation  · Skin and Subcutaneous Tissue  o General Inspection  o : no lesions present, no rashes present  · Neurologic  o Mental Status Examination  o :   § Orientation  § : grossly oriented to person, place and " time  § Speech/Language  § : communication ability within normal limits, voice quality normal, articulation of speech normal, no evidence of aphasia  § Attention  § : attention normal, concentration abilities normal  o Sensation  o : grossly intact  o Gait and Station  o :   § Gait Screening  § : normal gait  · Psychiatric  o General  o : Alert and oriented x3  o Mood and Affect  o : Mood and affect are appropriate to circumstances          Assessment  · Pre-op exam     V72.84/Z01.818  · Constipation     564.00/K59.00  improved  · Esophageal dysphagia     787.20/R13.10  hx spasm, also Sjogren's disease  · Screening for colorectal cancer       Encounter for screening for malignant neoplasm of colon     V76.51/Z12.11  Encounter for screening for malignant neoplasm of rectum     V76.51/Z12.12      Plan  · Medications  o Golytely 236-22.74-6.74 -5.86 gram oral recon soln   SIG: take as directed   DISP: (1) Box with 0 refills  Prescribed on 05/05/2021     o Linzess 72 mcg oral capsule   SIG: take 1 capsule by oral route daily for 30 days take 30 min before meal   DISP: (30) Capsule with 5 refills  Refilled on 05/05/2021     o Medications have been Reconciled  o Transition of Care or Provider Policy  · Instructions  o Please Sign Permit for: EGD/COLONOSCOPY  o Indication: dysphagia, screening colon  o Surgical Risk and Benefits: Possible risks/complications, benefits, and alternatives to surgical or invasive procedure have been explained to patient and/or legal guardian; Patient has been evaluated and can tolerate anesthesia and/or sedation. Risks, benefits, and alternatives to anesthesia and sedation have been explained to patient and/or legal guardian.  o Follow Up after Procedure.  o Other Instructions: clearance dr newberry and blair  o Encouraged to follow-up with Primary Care Provider for preventative care.  o Patient was educated/instructed on their diagnosis, treatment and medications prior to discharge from the  clinic today.  o Patient instructed to seek medical attention urgently for new or worsening symptoms.            Electronically Signed by: TY Nagel -Author on May 5, 2021 02:54:46 PM

## 2021-06-18 RX ORDER — OMEGA-3/DHA/EPA/FISH OIL 60 MG-90MG
1 CAPSULE ORAL DAILY
COMMUNITY

## 2021-06-18 RX ORDER — UBIDECARENONE 100 MG
1 CAPSULE ORAL NIGHTLY
COMMUNITY

## 2021-06-18 RX ORDER — ATORVASTATIN CALCIUM 20 MG/1
20 TABLET, FILM COATED ORAL NIGHTLY
COMMUNITY

## 2021-06-18 RX ORDER — ERGOCALCIFEROL 1.25 MG/1
50000 CAPSULE ORAL WEEKLY
COMMUNITY

## 2021-06-18 RX ORDER — CALCIUM CARBONATE 300MG(750)
1 TABLET,CHEWABLE ORAL DAILY
COMMUNITY
End: 2022-05-13

## 2021-06-21 ENCOUNTER — HOSPITAL ENCOUNTER (OUTPATIENT)
Dept: NUCLEAR MEDICINE | Facility: HOSPITAL | Age: 71
Discharge: HOME | End: 2021-06-21

## 2021-06-21 VITALS — WEIGHT: 180.34 LBS | HEIGHT: 65 IN | BODY MASS INDEX: 30.05 KG/M2

## 2021-06-21 DIAGNOSIS — R07.9 CHEST PAIN, UNSPECIFIED TYPE: ICD-10-CM

## 2021-06-21 PROCEDURE — A9502 TC99M TETROFOSMIN: HCPCS | Performed by: INTERNAL MEDICINE

## 2021-06-21 PROCEDURE — 93017 CV STRESS TEST TRACING ONLY: CPT

## 2021-06-21 PROCEDURE — 78452 HT MUSCLE IMAGE SPECT MULT: CPT

## 2021-06-21 PROCEDURE — 78452 HT MUSCLE IMAGE SPECT MULT: CPT | Performed by: INTERNAL MEDICINE

## 2021-06-21 PROCEDURE — 93016 CV STRESS TEST SUPVJ ONLY: CPT | Performed by: NURSE PRACTITIONER

## 2021-06-21 PROCEDURE — 0 TECHNETIUM TETROFOSMIN KIT: Performed by: INTERNAL MEDICINE

## 2021-06-21 PROCEDURE — 93018 CV STRESS TEST I&R ONLY: CPT | Performed by: INTERNAL MEDICINE

## 2021-06-21 RX ADMIN — TETROFOSMIN 1 DOSE: 1.38 INJECTION, POWDER, LYOPHILIZED, FOR SOLUTION INTRAVENOUS at 10:00

## 2021-06-21 RX ADMIN — TETROFOSMIN 1 DOSE: 1.38 INJECTION, POWDER, LYOPHILIZED, FOR SOLUTION INTRAVENOUS at 11:28

## 2021-06-22 ENCOUNTER — TELEPHONE (OUTPATIENT)
Dept: CARDIOLOGY | Facility: CLINIC | Age: 71
End: 2021-06-22

## 2021-06-22 LAB
BH CV IMMEDIATE POST RECOVERY TECH DATA SYMPTOMS: NORMAL
BH CV IMMEDIATE POST TECH DATA BLOOD PRESSURE: NORMAL MMHG
BH CV IMMEDIATE POST TECH DATA HEART RATE: 140 BPM
BH CV IMMEDIATE POST TECH DATA OXYGEN SATS: 96 %
BH CV NINE MINUTE RECOVERY TECH DATA BLOOD PRESSURE: NORMAL MMHG
BH CV NINE MINUTE RECOVERY TECH DATA HEART RATE: 85 BPM
BH CV NINE MINUTE RECOVERY TECH DATA OXYGEN SATURATION: 98 %
BH CV REST NUCLEAR ISOTOPE DOSE: 9.9 MCI
BH CV SIX MINUTE RECOVERY TECH DATA BLOOD PRESSURE: NORMAL
BH CV SIX MINUTE RECOVERY TECH DATA HEART RATE: 102 BPM
BH CV SIX MINUTE RECOVERY TECH DATA OXYGEN SATURATION: 98 %
BH CV STRESS BP STAGE 1: NORMAL
BH CV STRESS BP STAGE 2: NORMAL
BH CV STRESS BP STAGE 3: NORMAL
BH CV STRESS DURATION MIN STAGE 1: 3
BH CV STRESS DURATION MIN STAGE 2: 3
BH CV STRESS DURATION MIN STAGE 3: 3
BH CV STRESS DURATION SEC STAGE 1: 0
BH CV STRESS DURATION SEC STAGE 2: 0
BH CV STRESS DURATION SEC STAGE 3: 0
BH CV STRESS GRADE STAGE 1: 10
BH CV STRESS GRADE STAGE 2: 12
BH CV STRESS GRADE STAGE 3: 14
BH CV STRESS HR STAGE 1: 111
BH CV STRESS HR STAGE 2: 135
BH CV STRESS HR STAGE 3: 140
BH CV STRESS METS STAGE 1: 5
BH CV STRESS METS STAGE 2: 7.5
BH CV STRESS METS STAGE 3: 10
BH CV STRESS NUCLEAR ISOTOPE DOSE: 32.4 MCI
BH CV STRESS O2 STAGE 1: 96
BH CV STRESS O2 STAGE 2: 96
BH CV STRESS O2 STAGE 3: 95
BH CV STRESS PROTOCOL 1: NORMAL
BH CV STRESS SPEED STAGE 1: 1.7
BH CV STRESS SPEED STAGE 2: 2.5
BH CV STRESS SPEED STAGE 3: 3.4
BH CV STRESS STAGE 1: 6
BH CV STRESS STAGE 2: 2
BH CV STRESS STAGE 3: 3
BH CV THREE MINUTE POST TECH DATA BLOOD PRESSURE: NORMAL MMHG
BH CV THREE MINUTE POST TECH DATA HEART RATE: 107 BPM
BH CV THREE MINUTE POST TECH DATA OXYGEN SATURATION: 98 %
BH CV THREE MINUTE RECOVERY TECH DATA SYMPTOM: NORMAL
BH CV TWELVE MINUTE RECOVERY TECH DATA BLOOD PRESSURE: NORMAL MMHG
BH CV TWELVE MINUTE RECOVERY TECH DATA HEART RATE: 86 BPM
BH CV TWELVE MINUTE RECOVERY TECH DATA OXYGEN SATURATION: 96 %
LV EF NUC BP: 79 %
MAXIMAL PREDICTED HEART RATE: 149 BPM
PERCENT MAX PREDICTED HR: 93.96 %
STRESS BASELINE BP: NORMAL MMHG
STRESS BASELINE HR: 66 BPM
STRESS O2 SAT REST: 98 %
STRESS PERCENT HR: 111 %
STRESS POST ESTIMATED WORKLOAD: 7.8 METS
STRESS POST EXERCISE DUR MIN: 6 MIN
STRESS POST EXERCISE DUR SEC: 25 SEC
STRESS POST O2 SAT PEAK: 95 %
STRESS POST PEAK BP: NORMAL MMHG
STRESS POST PEAK HR: 140 BPM
STRESS TARGET HR: 127 BPM

## 2021-06-22 RX ORDER — ALBUTEROL SULFATE 90 UG/1
1 AEROSOL, METERED RESPIRATORY (INHALATION) EVERY 6 HOURS PRN
COMMUNITY

## 2021-06-22 RX ORDER — HYDROCHLOROTHIAZIDE 25 MG/1
25 TABLET ORAL DAILY
COMMUNITY
End: 2022-05-13

## 2021-06-22 NOTE — PRE-PROCEDURE INSTRUCTIONS
INSTRUCTED ON LAXATIVE PREP-PRE OP MEDS-CLEAR LIQUID DIET-SMALL SIP OF WATER WITH MEDS    MEDS TO TAKE    DILTIAZEM

## 2021-06-22 NOTE — TELEPHONE ENCOUNTER
Per Dr. Goodwin, patient can be cleared for colonoscopy and/or EGD at Low risk.  SPECT was normal.   Clearance faxed to Dr. Kang.

## 2021-06-22 NOTE — TELEPHONE ENCOUNTER
----- Message from TY Echols sent at 6/22/2021  2:50 PM EDT -----  Notify patient stress test results  Myocardial perfusion imaging indicates a negative myocardial perfusion study with no evidence of ischemia.

## 2021-06-22 NOTE — TELEPHONE ENCOUNTER
Cardiac Clearance Request:    Procedure:  Colonscopy and/or EGD with Dr. Kang, 6/23/21.    Patient had SPECT yesterday, 6/21.    Can patient be cleared for procedure?

## 2021-06-22 NOTE — TELEPHONE ENCOUNTER
Called patient and informed her of SPECT results.  Per Dr. Goodwin SPECT was normal.  OK to clear for procedure.

## 2021-06-23 ENCOUNTER — ANESTHESIA (OUTPATIENT)
Dept: GASTROENTEROLOGY | Facility: HOSPITAL | Age: 71
End: 2021-06-23

## 2021-06-23 ENCOUNTER — ANESTHESIA EVENT (OUTPATIENT)
Dept: GASTROENTEROLOGY | Facility: HOSPITAL | Age: 71
End: 2021-06-23

## 2021-06-23 ENCOUNTER — HOSPITAL ENCOUNTER (OUTPATIENT)
Facility: HOSPITAL | Age: 71
Setting detail: HOSPITAL OUTPATIENT SURGERY
Discharge: HOME | End: 2021-06-23
Attending: INTERNAL MEDICINE | Admitting: INTERNAL MEDICINE

## 2021-06-23 VITALS
WEIGHT: 178.57 LBS | DIASTOLIC BLOOD PRESSURE: 80 MMHG | SYSTOLIC BLOOD PRESSURE: 140 MMHG | RESPIRATION RATE: 17 BRPM | TEMPERATURE: 97.1 F | BODY MASS INDEX: 29.72 KG/M2 | OXYGEN SATURATION: 100 % | HEART RATE: 63 BPM

## 2021-06-23 DIAGNOSIS — R13.10 DYSPHAGIA: ICD-10-CM

## 2021-06-23 PROBLEM — K21.00 REFLUX ESOPHAGITIS: Status: ACTIVE | Noted: 2021-06-23

## 2021-06-23 PROCEDURE — G0121 COLON CA SCRN NOT HI RSK IND: HCPCS | Performed by: INTERNAL MEDICINE

## 2021-06-23 PROCEDURE — 43239 EGD BIOPSY SINGLE/MULTIPLE: CPT | Performed by: INTERNAL MEDICINE

## 2021-06-23 PROCEDURE — 25010000002 PROPOFOL 10 MG/ML EMULSION: Performed by: NURSE ANESTHETIST, CERTIFIED REGISTERED

## 2021-06-23 PROCEDURE — 88305 TISSUE EXAM BY PATHOLOGIST: CPT | Performed by: INTERNAL MEDICINE

## 2021-06-23 RX ORDER — SODIUM CHLORIDE 0.9 % (FLUSH) 0.9 %
3 SYRINGE (ML) INJECTION EVERY 12 HOURS SCHEDULED
Status: DISCONTINUED | OUTPATIENT
Start: 2021-06-23 | End: 2021-06-23 | Stop reason: HOSPADM

## 2021-06-23 RX ORDER — LIDOCAINE HYDROCHLORIDE 20 MG/ML
INJECTION, SOLUTION INFILTRATION; PERINEURAL AS NEEDED
Status: DISCONTINUED | OUTPATIENT
Start: 2021-06-23 | End: 2021-06-23 | Stop reason: SURG

## 2021-06-23 RX ORDER — SODIUM CHLORIDE, SODIUM LACTATE, POTASSIUM CHLORIDE, CALCIUM CHLORIDE 600; 310; 30; 20 MG/100ML; MG/100ML; MG/100ML; MG/100ML
30 INJECTION, SOLUTION INTRAVENOUS CONTINUOUS
Status: DISCONTINUED | OUTPATIENT
Start: 2021-06-23 | End: 2021-06-23 | Stop reason: HOSPADM

## 2021-06-23 RX ORDER — PANTOPRAZOLE SODIUM 40 MG/1
40 TABLET, DELAYED RELEASE ORAL DAILY
Qty: 30 TABLET | Refills: 5 | Status: SHIPPED | OUTPATIENT
Start: 2021-06-23 | End: 2021-12-16 | Stop reason: SDUPTHER

## 2021-06-23 RX ORDER — SODIUM CHLORIDE 0.9 % (FLUSH) 0.9 %
10 SYRINGE (ML) INJECTION AS NEEDED
Status: DISCONTINUED | OUTPATIENT
Start: 2021-06-23 | End: 2021-06-23 | Stop reason: HOSPADM

## 2021-06-23 RX ADMIN — LIDOCAINE HYDROCHLORIDE 100 MG: 20 INJECTION, SOLUTION INFILTRATION; PERINEURAL at 12:49

## 2021-06-23 RX ADMIN — PROPOFOL 200 MCG/KG/MIN: 10 INJECTION, EMULSION INTRAVENOUS at 12:49

## 2021-06-23 RX ADMIN — SODIUM CHLORIDE, POTASSIUM CHLORIDE, SODIUM LACTATE AND CALCIUM CHLORIDE 30 ML/HR: 600; 310; 30; 20 INJECTION, SOLUTION INTRAVENOUS at 12:21

## 2021-06-23 NOTE — ANESTHESIA POSTPROCEDURE EVALUATION
Patient: Dk Zuniga    Procedure Summary     Date: 06/23/21 Room / Location: [facility] ENDOSCOPY 4 / [facility] ENDOSCOPY    Anesthesia Start: 1246 Anesthesia Stop: 1318    Procedures:       ESOPHAGOGASTRODUODENOSCOPY WITH BIOPSIES (N/A )      COLONOSCOPY (N/A ) Diagnosis: (DYSPHAGIA, SCREENING)    Surgeons: Marcio Kang MD Provider: Mao Guan MD    Anesthesia Type: general ASA Status: 2          Anesthesia Type: general    Vitals  Vitals Value Taken Time   /80 06/23/21 1344   Temp 36.2 °C (97.1 °F) 06/23/21 1325   Pulse 63 06/23/21 1344   Resp 18 06/23/21 1340   SpO2 100 % 06/23/21 1344   Vitals shown include unvalidated device data.        Post Anesthesia Care and Evaluation    Patient location during evaluation: PACU  Patient participation: complete - patient participated  Level of consciousness: awake and alert  Pain score: 0  Pain management: adequate  Airway patency: patent  Anesthetic complications: No anesthetic complications  PONV Status: none  Cardiovascular status: acceptable  Respiratory status: acceptable  Hydration status: acceptable  No anesthesia care post op

## 2021-06-23 NOTE — H&P
Pre Procedure History & Physical    Chief Complaint:   Dysphagia, screening    Subjective     HPI:   Dysphagia, average risk.    Past Medical History:   Past Medical History:   Diagnosis Date   • Asthma    • Atypical chest pain     STRESS TEST 6/21/2021 CLEARED BY DR. PANG 6/22/21 PER KATI    • Dyslipidemia    • Hyperlipidemia    • Hypertension    • Obstructive sleep apnea     Compliant with CPAP   • PONV (postoperative nausea and vomiting)    • Sjogren's syndrome (CMS/HCC)        Past Surgical History:  Past Surgical History:   Procedure Laterality Date   • BREAST IMPLANT SURGERY Bilateral     REMOVED    • CHOLECYSTECTOMY     • COLONOSCOPY     • HYSTERECTOMY     • PLEURAL SCARIFICATION Right    • TONSILLECTOMY         Family History:  Family History   Problem Relation Age of Onset   • Heart disease Mother    • Heart disease Father         qaud bi pass   • Malig Hyperthermia Neg Hx        Social History:   reports that she quit smoking about 40 years ago. She does not have any smokeless tobacco history on file. She reports current alcohol use of about 1.0 standard drinks of alcohol per week. She reports that she does not use drugs.    Medications:   Medications Prior to Admission   Medication Sig Dispense Refill Last Dose   • albuterol sulfate HFA (Ventolin HFA) 108 (90 Base) MCG/ACT inhaler Ventolin HFA 90 mcg/actuation inhalation HFA aerosol inhaler inhale 1 puff (90 mcg) by inhalation route every 6 hours as needed   Active   Past Week at Unknown time   • ALLERGY SERUM INJECTION Inject  under the skin into the appropriate area as directed 1 (One) Time. Weekly   Past Week at Unknown time   • atorvastatin (LIPITOR) 20 MG tablet Take 20 mg by mouth Daily.   Past Week at Unknown time   • coenzyme Q10 100 MG capsule Take 1 capsule by mouth Every Night.   Past Week at Unknown time   • cyanocobalamin (VITAMIN B-12) 1000 MCG tablet Take 1 tablet by mouth Daily.   Past Week at Unknown time   • diltiaZEM (CARDIZEM) 30  MG tablet Take 30 mg by mouth 3 (Three) Times a Day.   Past Week at Unknown time   • hydroCHLOROthiazide (HYDRODIURIL) 25 MG tablet Take 25 mg by mouth Daily.   Past Week at Unknown time   • hydrochlorothiazide (MICROZIDE) 12.5 MG capsule Take 25 mg by mouth Every Morning.   Past Week at Unknown time   • HYDROcodone-acetaminophen (NORCO) 7.5-325 MG per tablet    Past Week at Unknown time   • linaclotide (Linzess) 72 MCG capsule capsule Take 72 mcg by mouth Daily.   6/22/2021 at 0900   • losartan (COZAAR) 50 MG tablet Take 50 mg by mouth 2 (Two) Times a Day.   Past Week at Unknown time   • Magnesium 400 MG tablet Take 1 tablet by mouth Daily.   Past Week at Unknown time   • Omega-3 Fatty Acids (fish oil) 500 MG capsule capsule Take 1 capsule by mouth Daily.   Past Week at Unknown time   • ranitidine (ZANTAC) 300 MG tablet    Past Week at Unknown time   • Turmeric (QC TUMERIC COMPLEX PO) Take 1 capsule by mouth Daily.   Past Week at Unknown time   • vitamin D (ERGOCALCIFEROL) 1.25 MG (63967 UT) capsule capsule Take 50,000 Units by mouth 1 (One) Time Per Week.   Past Week at Unknown time       Allergies:  Latex, Penicillins, Tetanus toxoids, and Effexor [venlafaxine]        Objective     Blood pressure 160/69, pulse 69, temperature 98.4 °F (36.9 °C), temperature source Temporal, resp. rate 18, weight 81 kg (178 lb 9.2 oz), SpO2 98 %.    Physical Exam   Constitutional: Pt is oriented to person, place, and time and well-developed, well-nourished, and in no distress.   Mouth/Throat: Oropharynx is clear and moist.   Neck: Normal range of motion.   Cardiovascular: Normal rate, regular rhythm and normal heart sounds.    Pulmonary/Chest: Effort normal and breath sounds normal.   Abdominal: Soft. Nontender  Skin: Skin is warm and dry.   Psychiatric: Mood, memory, affect and judgment normal.     Assessment/Plan     Diagnosis:  Dysphagia, average of screening    Anticipated Surgical Procedure:  EGD and colonoscopy    The risks,  benefits, and alternatives of this procedure have been discussed with the patient or the responsible party- the patient understands and agrees to proceed.

## 2021-06-23 NOTE — H&P
Pre Procedure History & Physical    Chief Complaint: Dysphagia screening    Subjective     HPI:   Dysphagia and screening    Past Medical History:   Past Medical History:   Diagnosis Date    Asthma     Atypical chest pain     STRESS TEST 6/21/2021 CLEARED BY DR. PANG 6/22/21 PER KATI     Dyslipidemia     Hyperlipidemia     Hypertension     Obstructive sleep apnea     Compliant with CPAP    PONV (postoperative nausea and vomiting)     Sjogren's syndrome (CMS/HCC)        Past Surgical History:  Past Surgical History:   Procedure Laterality Date    BREAST IMPLANT SURGERY Bilateral     REMOVED     CHOLECYSTECTOMY      COLONOSCOPY      HYSTERECTOMY      PLEURAL SCARIFICATION Right     TONSILLECTOMY         Family History:  Family History   Problem Relation Age of Onset    Heart disease Mother     Heart disease Father         joanna chen    Malig Hyperthermia Neg Hx        Social History:   reports that she quit smoking about 40 years ago. She does not have any smokeless tobacco history on file. She reports current alcohol use of about 1.0 standard drinks of alcohol per week. She reports that she does not use drugs.    Medications:   Medications Prior to Admission   Medication Sig Dispense Refill Last Dose    albuterol sulfate HFA (Ventolin HFA) 108 (90 Base) MCG/ACT inhaler Ventolin HFA 90 mcg/actuation inhalation HFA aerosol inhaler inhale 1 puff (90 mcg) by inhalation route every 6 hours as needed   Active   Past Week at Unknown time    ALLERGY SERUM INJECTION Inject  under the skin into the appropriate area as directed 1 (One) Time. Weekly   Past Week at Unknown time    atorvastatin (LIPITOR) 20 MG tablet Take 20 mg by mouth Daily.   Past Week at Unknown time    coenzyme Q10 100 MG capsule Take 1 capsule by mouth Every Night.   Past Week at Unknown time    cyanocobalamin (VITAMIN B-12) 1000 MCG tablet Take 1 tablet by mouth Daily.   Past Week at Unknown time    diltiaZEM (CARDIZEM) 30 MG tablet Take 30 mg by  mouth 3 (Three) Times a Day.   Past Week at Unknown time    hydroCHLOROthiazide (HYDRODIURIL) 25 MG tablet Take 25 mg by mouth Daily.   Past Week at Unknown time    hydrochlorothiazide (MICROZIDE) 12.5 MG capsule Take 25 mg by mouth Every Morning.   Past Week at Unknown time    HYDROcodone-acetaminophen (NORCO) 7.5-325 MG per tablet    Past Week at Unknown time    linaclotide (Linzess) 72 MCG capsule capsule Take 72 mcg by mouth Daily.   6/22/2021 at 0900    losartan (COZAAR) 50 MG tablet Take 50 mg by mouth 2 (Two) Times a Day.   Past Week at Unknown time    Magnesium 400 MG tablet Take 1 tablet by mouth Daily.   Past Week at Unknown time    Omega-3 Fatty Acids (fish oil) 500 MG capsule capsule Take 1 capsule by mouth Daily.   Past Week at Unknown time    ranitidine (ZANTAC) 300 MG tablet    Past Week at Unknown time    Turmeric (QC TUMERIC COMPLEX PO) Take 1 capsule by mouth Daily.   Past Week at Unknown time    vitamin D (ERGOCALCIFEROL) 1.25 MG (39270 UT) capsule capsule Take 50,000 Units by mouth 1 (One) Time Per Week.   Past Week at Unknown time       Allergies:  Latex, Penicillins, Tetanus toxoids, and Effexor [venlafaxine]        Objective     Blood pressure 95/65, pulse 63, temperature 98.4 °F (36.9 °C), temperature source Temporal, resp. rate 15, weight 81 kg (178 lb 9.2 oz), SpO2 97 %.    Physical Exam   Constitutional: Pt is oriented to person, place, and time and well-developed, well-nourished, and in no distress.   Mouth/Throat: Oropharynx is clear and moist.   Neck: Normal range of motion.   Cardiovascular: Normal rate, regular rhythm and normal heart sounds.    Pulmonary/Chest: Effort normal and breath sounds normal.   Abdominal: Soft. Nontender  Skin: Skin is warm and dry.   Psychiatric: Mood, memory, affect and judgment normal.     Assessment/Plan     Diagnosis:  Aphasia and screening    Anticipated Surgical Procedure:  EGD and colonoscopy    The risks, benefits, and alternatives of this procedure  have been discussed with the patient or the responsible party- the patient understands and agrees to proceed.

## 2021-06-23 NOTE — ANESTHESIA PREPROCEDURE EVALUATION
Anesthesia Evaluation     Patient summary reviewed and Nursing notes reviewed   history of anesthetic complications: PONV  NPO Solid Status: > 4 hours  NPO Liquid Status: > 4 hours           Airway   Mallampati: II  TM distance: >3 FB  Neck ROM: full  No difficulty expected  Dental - normal exam     Pulmonary - normal exam   (+) asthma,sleep apnea on CPAP,   Cardiovascular - normal exam    (+) hypertension well controlled 2 medications or greater, hyperlipidemia,       Neuro/Psych- negative ROS  GI/Hepatic/Renal/Endo - negative ROS     Musculoskeletal (-) negative ROS    Abdominal  - normal exam    Bowel sounds: normal.   Substance History - negative use     OB/GYN negative ob/gyn ROS         Other   autoimmune disease Sjogren syndrome,                      Anesthesia Plan    ASA 2     general   total IV anesthesia  intravenous induction     Anesthetic plan, all risks, benefits, and alternatives have been provided, discussed and informed consent has been obtained with: patient.

## 2021-06-24 LAB
CYTO UR: NORMAL
LAB AP CASE REPORT: NORMAL
LAB AP CLINICAL INFORMATION: NORMAL
PATH REPORT.FINAL DX SPEC: NORMAL
PATH REPORT.GROSS SPEC: NORMAL

## 2021-07-02 ENCOUNTER — TELEPHONE (OUTPATIENT)
Dept: GASTROENTEROLOGY | Facility: CLINIC | Age: 71
End: 2021-07-02

## 2021-07-02 ENCOUNTER — APPOINTMENT (OUTPATIENT)
Dept: GENERAL RADIOLOGY | Facility: HOSPITAL | Age: 71
End: 2021-07-02

## 2021-07-02 ENCOUNTER — HOSPITAL ENCOUNTER (EMERGENCY)
Facility: HOSPITAL | Age: 71
Discharge: HOME | End: 2021-07-02
Attending: EMERGENCY MEDICINE | Admitting: EMERGENCY MEDICINE

## 2021-07-02 VITALS
HEIGHT: 65 IN | HEART RATE: 62 BPM | TEMPERATURE: 98.7 F | DIASTOLIC BLOOD PRESSURE: 70 MMHG | OXYGEN SATURATION: 97 % | WEIGHT: 178.79 LBS | RESPIRATION RATE: 16 BRPM | BODY MASS INDEX: 29.79 KG/M2 | SYSTOLIC BLOOD PRESSURE: 175 MMHG

## 2021-07-02 DIAGNOSIS — R07.89 CHEST WALL PAIN: Primary | ICD-10-CM

## 2021-07-02 LAB
ALBUMIN SERPL-MCNC: 4.3 G/DL (ref 3.5–5.2)
ALBUMIN/GLOB SERPL: 1.5 G/DL
ALP SERPL-CCNC: 97 U/L (ref 39–117)
ALT SERPL W P-5'-P-CCNC: 22 U/L (ref 1–33)
ANION GAP SERPL CALCULATED.3IONS-SCNC: 10.2 MMOL/L (ref 5–15)
AST SERPL-CCNC: 21 U/L (ref 1–32)
BASOPHILS # BLD AUTO: 0.05 10*3/MM3 (ref 0–0.2)
BASOPHILS NFR BLD AUTO: 0.4 % (ref 0–1.5)
BILIRUB SERPL-MCNC: 0.4 MG/DL (ref 0–1.2)
BUN SERPL-MCNC: 14 MG/DL (ref 8–23)
BUN/CREAT SERPL: 15.9 (ref 7–25)
CALCIUM SPEC-SCNC: 9.1 MG/DL (ref 8.6–10.5)
CHLORIDE SERPL-SCNC: 96 MMOL/L (ref 98–107)
CK MB SERPL-CCNC: <1 NG/ML
CK SERPL-CCNC: 47 U/L (ref 20–180)
CO2 SERPL-SCNC: 23.8 MMOL/L (ref 22–29)
CREAT SERPL-MCNC: 0.88 MG/DL (ref 0.57–1)
DEPRECATED RDW RBC AUTO: 41.9 FL (ref 37–54)
EOSINOPHIL # BLD AUTO: 0.14 10*3/MM3 (ref 0–0.4)
EOSINOPHIL NFR BLD AUTO: 1 % (ref 0.3–6.2)
ERYTHROCYTE [DISTWIDTH] IN BLOOD BY AUTOMATED COUNT: 13 % (ref 12.3–15.4)
GFR SERPL CREATININE-BSD FRML MDRD: 63 ML/MIN/1.73
GLOBULIN UR ELPH-MCNC: 2.9 GM/DL
GLUCOSE SERPL-MCNC: 96 MG/DL (ref 65–99)
HCT VFR BLD AUTO: 43.1 % (ref 34–46.6)
HGB BLD-MCNC: 15.1 G/DL (ref 12–15.9)
HOLD SPECIMEN: NORMAL
HOLD SPECIMEN: NORMAL
IMM GRANULOCYTES # BLD AUTO: 0.06 10*3/MM3 (ref 0–0.05)
IMM GRANULOCYTES NFR BLD AUTO: 0.4 % (ref 0–0.5)
LIPASE SERPL-CCNC: 37 U/L (ref 13–60)
LYMPHOCYTES # BLD AUTO: 1.83 10*3/MM3 (ref 0.7–3.1)
LYMPHOCYTES NFR BLD AUTO: 13 % (ref 19.6–45.3)
MAGNESIUM SERPL-MCNC: 1.9 MG/DL (ref 1.6–2.4)
MCH RBC QN AUTO: 30.9 PG (ref 26.6–33)
MCHC RBC AUTO-ENTMCNC: 35 G/DL (ref 31.5–35.7)
MCV RBC AUTO: 88.3 FL (ref 79–97)
MONOCYTES # BLD AUTO: 0.91 10*3/MM3 (ref 0.1–0.9)
MONOCYTES NFR BLD AUTO: 6.4 % (ref 5–12)
NEUTROPHILS NFR BLD AUTO: 11.14 10*3/MM3 (ref 1.7–7)
NEUTROPHILS NFR BLD AUTO: 78.8 % (ref 42.7–76)
NRBC BLD AUTO-RTO: 0 /100 WBC (ref 0–0.2)
NT-PROBNP SERPL-MCNC: 9.1 PG/ML (ref 0–900)
PLATELET # BLD AUTO: 248 10*3/MM3 (ref 140–450)
PMV BLD AUTO: 9.1 FL (ref 6–12)
POTASSIUM SERPL-SCNC: 4.1 MMOL/L (ref 3.5–5.2)
PROT SERPL-MCNC: 7.2 G/DL (ref 6–8.5)
RBC # BLD AUTO: 4.88 10*6/MM3 (ref 3.77–5.28)
SODIUM SERPL-SCNC: 130 MMOL/L (ref 136–145)
TROPONIN I SERPL-MCNC: 0 NG/ML (ref 0–0.6)
TROPONIN I SERPL-MCNC: 0 NG/ML (ref 0–0.6)
WBC # BLD AUTO: 14.13 10*3/MM3 (ref 3.4–10.8)
WHOLE BLOOD HOLD SPECIMEN: NORMAL

## 2021-07-02 PROCEDURE — 82550 ASSAY OF CK (CPK): CPT | Performed by: EMERGENCY MEDICINE

## 2021-07-02 PROCEDURE — 93010 ELECTROCARDIOGRAM REPORT: CPT | Performed by: SPECIALIST

## 2021-07-02 PROCEDURE — 82553 CREATINE MB FRACTION: CPT | Performed by: EMERGENCY MEDICINE

## 2021-07-02 PROCEDURE — 71045 X-RAY EXAM CHEST 1 VIEW: CPT

## 2021-07-02 PROCEDURE — 83735 ASSAY OF MAGNESIUM: CPT | Performed by: EMERGENCY MEDICINE

## 2021-07-02 PROCEDURE — 83880 ASSAY OF NATRIURETIC PEPTIDE: CPT | Performed by: EMERGENCY MEDICINE

## 2021-07-02 PROCEDURE — 85025 COMPLETE CBC W/AUTO DIFF WBC: CPT | Performed by: EMERGENCY MEDICINE

## 2021-07-02 PROCEDURE — 71045 X-RAY EXAM CHEST 1 VIEW: CPT | Performed by: RADIOLOGY

## 2021-07-02 PROCEDURE — 83690 ASSAY OF LIPASE: CPT | Performed by: EMERGENCY MEDICINE

## 2021-07-02 PROCEDURE — 84484 ASSAY OF TROPONIN QUANT: CPT

## 2021-07-02 PROCEDURE — 93005 ELECTROCARDIOGRAM TRACING: CPT

## 2021-07-02 PROCEDURE — 93005 ELECTROCARDIOGRAM TRACING: CPT | Performed by: EMERGENCY MEDICINE

## 2021-07-02 PROCEDURE — 80053 COMPREHEN METABOLIC PANEL: CPT | Performed by: EMERGENCY MEDICINE

## 2021-07-02 PROCEDURE — 99283 EMERGENCY DEPT VISIT LOW MDM: CPT

## 2021-07-02 RX ORDER — SODIUM CHLORIDE 0.9 % (FLUSH) 0.9 %
10 SYRINGE (ML) INJECTION AS NEEDED
Status: DISCONTINUED | OUTPATIENT
Start: 2021-07-02 | End: 2021-07-02 | Stop reason: HOSPADM

## 2021-07-02 NOTE — TELEPHONE ENCOUNTER
I spoke with patient and she has been scheduled for a F/U. Patient has been added to 3 year recall of EGD and 10 year colon recall.Olu has been dealing with some constipation and other issues since her procedures. Patient is seeing has seen her primary as other issues have arisen and was told to restart the Linzess.Patient wanted you to be aware.

## 2021-07-02 NOTE — TELEPHONE ENCOUNTER
----- Message from TY Lobo sent at 7/1/2021  4:32 PM EDT -----  Recall EGD 3 yrs, Colon 10 yrs; bx ok but schedule f/u (IM GE jxn)

## 2021-07-02 NOTE — ED PROVIDER NOTES
Time: 13:37 EDT  Arrived by: ambulance  Chief Complaint: chest pain  History provided by: patient  History is limited by: N/A    History of Present Illness:  Patient is a 71 y.o. year old female that presents to the emergency department with chest pain which began today at 1000. Patient reports chest pain only lasted a few minutes. Patient reports in the past week she was seen in the hospital with vertigo. The patient had a stress test one week ago and was told it was normal.       History provided by:  Patient  Chest Pain  Pain location:  R chest  Pain quality: sharp    Pain radiates to:  Does not radiate  Onset quality:  Sudden  Associated symptoms: dizziness and nausea    Associated symptoms: no abdominal pain, no back pain, no cough, no fever, no shortness of breath and no vomiting        Patient Care Team  Primary Care Provider: Haritha Iraheta MD      Past Medical History:     Allergies   Allergen Reactions   • Latex Hives and Rash   • Penicillins Rash   • Tetanus Toxoids Hives and Swelling   • Effexor [Venlafaxine] Unknown - Low Severity     Past Medical History:   Diagnosis Date   • Asthma    • Atypical chest pain     STRESS TEST 6/21/2021 CLEARED BY DR. PANG 6/22/21 PER KATI    • Dyslipidemia    • Hyperlipidemia    • Hypertension    • Obstructive sleep apnea     Compliant with CPAP   • PONV (postoperative nausea and vomiting)    • Sjogren's syndrome (CMS/HCC)      Past Surgical History:   Procedure Laterality Date   • BREAST IMPLANT SURGERY Bilateral     REMOVED    • CHOLECYSTECTOMY     • COLONOSCOPY     • COLONOSCOPY N/A 6/23/2021    Procedure: COLONOSCOPY;  Surgeon: Marcio Kang MD;  Location: [facility] ENDOSCOPY;  Service: Gastroenterology;  Laterality: N/A;  NORMAL COLON   • ENDOSCOPY N/A 6/23/2021    Procedure: ESOPHAGOGASTRODUODENOSCOPY WITH BIOPSIES;  Surgeon: Marcio Kang MD;  Location: [facility] ENDOSCOPY;  Service: Gastroenterology;  Laterality: N/A;  REFLUX ESOPHAGITIS   •  HYSTERECTOMY     • PLEURAL SCARIFICATION Right    • TONSILLECTOMY       Family History   Problem Relation Age of Onset   • Heart disease Mother    • Heart disease Father         qaud bi pass   • Malig Hyperthermia Neg Hx        Home Medications:  Prior to Admission medications    Medication Sig Start Date End Date Taking? Authorizing Provider   albuterol sulfate HFA (Ventolin HFA) 108 (90 Base) MCG/ACT inhaler Ventolin HFA 90 mcg/actuation inhalation HFA aerosol inhaler inhale 1 puff (90 mcg) by inhalation route every 6 hours as needed   Active    Tawanda Momin MD   ALLERGY SERUM INJECTION Inject  under the skin into the appropriate area as directed 1 (One) Time. Weekly    Tawanda Momin MD   atorvastatin (LIPITOR) 20 MG tablet Take 20 mg by mouth Daily.    Tawanda Momin MD   coenzyme Q10 100 MG capsule Take 1 capsule by mouth Every Night.    Tawanda Momin MD   cyanocobalamin (VITAMIN B-12) 1000 MCG tablet Take 1 tablet by mouth Daily.    Tawanda Momin MD   diltiaZEM (CARDIZEM) 30 MG tablet Take 30 mg by mouth 3 (Three) Times a Day. 10/21/16   Tawanda Momin MD   hydroCHLOROthiazide (HYDRODIURIL) 25 MG tablet Take 25 mg by mouth Daily.    Tawanda Momin MD   hydrochlorothiazide (MICROZIDE) 12.5 MG capsule Take 25 mg by mouth Every Morning. 6/28/16   Tawanda Momin MD   HYDROcodone-acetaminophen (NORCO) 7.5-325 MG per tablet  8/1/16   Tawanda Momin MD   linaclotide (Linzess) 72 MCG capsule capsule Take 72 mcg by mouth Daily.    Tawanda Momin MD   losartan (COZAAR) 50 MG tablet Take 50 mg by mouth 2 (Two) Times a Day. 7/14/16   Tawanda Mmoin MD   Magnesium 400 MG tablet Take 1 tablet by mouth Daily.    Tawanda Momin MD   Omega-3 Fatty Acids (fish oil) 500 MG capsule capsule Take 1 capsule by mouth Daily.    Tawanda Momin MD   pantoprazole (PROTONIX) 40 MG EC tablet Take 1 tablet by mouth Daily. 6/23/21   Marcio Kang  "MD Danny   Turmeric (QC TUMERIC COMPLEX PO) Take 1 capsule by mouth Daily.    Provider, MD Tawanda   vitamin D (ERGOCALCIFEROL) 1.25 MG (46993 UT) capsule capsule Take 50,000 Units by mouth 1 (One) Time Per Week.    Provider, MD Tawanda        Social History:   PT  reports that she quit smoking about 40 years ago. She does not have any smokeless tobacco history on file. She reports current alcohol use of about 1.0 standard drinks of alcohol per week. She reports that she does not use drugs.    Record Review:  I have reviewed the patient's records in [facility].     Review of Systems  Review of Systems   Constitutional: Negative for chills and fever.   HENT: Negative for ear discharge.    Eyes: Negative for photophobia.   Respiratory: Negative for cough and shortness of breath.    Cardiovascular: Positive for chest pain.   Gastrointestinal: Positive for nausea. Negative for abdominal pain, diarrhea and vomiting.   Genitourinary: Negative for dysuria.   Musculoskeletal: Negative for back pain and neck pain.   Skin: Negative for rash.   Neurological: Positive for dizziness.        Physical Exam  /70   Pulse 62   Temp 98.7 °F (37.1 °C) (Oral)   Resp 16   Ht 165.1 cm (65\")   Wt 81.1 kg (178 lb 12.7 oz)   SpO2 97%   BMI 29.75 kg/m²     Physical Exam  Vitals and nursing note reviewed.   Constitutional:       General: She is not in acute distress.  HENT:      Head: Normocephalic and atraumatic.   Cardiovascular:      Rate and Rhythm: Normal rate and regular rhythm.      Heart sounds: No murmur heard.     Pulmonary:      Effort: No respiratory distress.      Breath sounds: Normal breath sounds.   Abdominal:      Palpations: Abdomen is soft.      Tenderness: There is no abdominal tenderness.   Musculoskeletal:      Cervical back: Neck supple.   Skin:     General: Skin is warm and dry.      Findings: No rash.   Neurological:      General: No focal deficit present.      Mental Status: She is alert and oriented " "to person, place, and time.                ED Course  /70   Pulse 62   Temp 98.7 °F (37.1 °C) (Oral)   Resp 16   Ht 165.1 cm (65\")   Wt 81.1 kg (178 lb 12.7 oz)   SpO2 97%   BMI 29.75 kg/m²   Results for orders placed or performed during the hospital encounter of 07/02/21   Comprehensive Metabolic Panel    Specimen: Blood   Result Value Ref Range    Glucose 96 65 - 99 mg/dL    BUN 14 8 - 23 mg/dL    Creatinine 0.88 0.57 - 1.00 mg/dL    Sodium 130 (L) 136 - 145 mmol/L    Potassium 4.1 3.5 - 5.2 mmol/L    Chloride 96 (L) 98 - 107 mmol/L    CO2 23.8 22.0 - 29.0 mmol/L    Calcium 9.1 8.6 - 10.5 mg/dL    Total Protein 7.2 6.0 - 8.5 g/dL    Albumin 4.30 3.50 - 5.20 g/dL    ALT (SGPT) 22 1 - 33 U/L    AST (SGOT) 21 1 - 32 U/L    Alkaline Phosphatase 97 39 - 117 U/L    Total Bilirubin 0.4 0.0 - 1.2 mg/dL    eGFR Non African Amer 63 >60 mL/min/1.73    Globulin 2.9 gm/dL    A/G Ratio 1.5 g/dL    BUN/Creatinine Ratio 15.9 7.0 - 25.0    Anion Gap 10.2 5.0 - 15.0 mmol/L   Lipase    Specimen: Blood   Result Value Ref Range    Lipase 37 13 - 60 U/L   BNP    Specimen: Blood   Result Value Ref Range    proBNP 9.1 0.0 - 900.0 pg/mL   Magnesium    Specimen: Blood   Result Value Ref Range    Magnesium 1.9 1.6 - 2.4 mg/dL   CK Total & CKMB    Specimen: Blood   Result Value Ref Range    CKMB <1.00 <=5.30 ng/mL    Creatine Kinase 47 20 - 180 U/L   CBC Auto Differential    Specimen: Blood   Result Value Ref Range    WBC 14.13 (H) 3.40 - 10.80 10*3/mm3    RBC 4.88 3.77 - 5.28 10*6/mm3    Hemoglobin 15.1 12.0 - 15.9 g/dL    Hematocrit 43.1 34.0 - 46.6 %    MCV 88.3 79.0 - 97.0 fL    MCH 30.9 26.6 - 33.0 pg    MCHC 35.0 31.5 - 35.7 g/dL    RDW 13.0 12.3 - 15.4 %    RDW-SD 41.9 37.0 - 54.0 fl    MPV 9.1 6.0 - 12.0 fL    Platelets 248 140 - 450 10*3/mm3    Neutrophil % 78.8 (H) 42.7 - 76.0 %    Lymphocyte % 13.0 (L) 19.6 - 45.3 %    Monocyte % 6.4 5.0 - 12.0 %    Eosinophil % 1.0 0.3 - 6.2 %    Basophil % 0.4 0.0 - 1.5 %    " Immature Grans % 0.4 0.0 - 0.5 %    Neutrophils, Absolute 11.14 (H) 1.70 - 7.00 10*3/mm3    Lymphocytes, Absolute 1.83 0.70 - 3.10 10*3/mm3    Monocytes, Absolute 0.91 (H) 0.10 - 0.90 10*3/mm3    Eosinophils, Absolute 0.14 0.00 - 0.40 10*3/mm3    Basophils, Absolute 0.05 0.00 - 0.20 10*3/mm3    Immature Grans, Absolute 0.06 (H) 0.00 - 0.05 10*3/mm3    nRBC 0.0 0.0 - 0.2 /100 WBC   POC Troponin I    Specimen: Blood   Result Value Ref Range    Troponin I 0.00 0.00 - 0.60 ng/mL   POC Troponin I    Specimen: Blood   Result Value Ref Range    Troponin I 0.00 0.00 - 0.60 ng/mL   ECG 12 Lead   Result Value Ref Range    QT Interval 382 ms   ECG 12 Lead   Result Value Ref Range    QT Interval 397 ms   Green Top (Gel)   Result Value Ref Range    Extra Tube Hold for add-ons.    Lavender Top   Result Value Ref Range    Extra Tube hold for add-on    Gold Top - SST   Result Value Ref Range    Extra Tube Hold for add-ons.      Medications   sodium chloride 0.9 % flush 10 mL (has no administration in time range)     XR Chest 1 View    Result Date: 7/2/2021  Narrative: PROCEDURE: XR CHEST 1 VW  COMPARISON: [facility], CT, CHEST W/O CONTRAST, 4/20/2021, 14:34.  INDICATIONS: Chest Pain triage protocol  FINDINGS:  A focus of irregular density is noted in the right suprahilar region, likely representing scarring or atelectasis.  No acute infiltrate or effusion is identified.  The cardiac and mediastinal silhouettes appear normal.  CONCLUSION:  1. Mild chronic scarring or atelectasis in the right suprahilar region.       David Morton M.D.       Electronically Signed and Approved By: David Morton M.D. on 7/02/2021 at 13:33             Stress Test With Myocardial Perfusion - One Day    Result Date: 6/22/2021  Narrative: · Left ventricular ejection fraction is hyperdynamic (Calculated EF > 70%). . · Findings consistent with an equivocal ECG stress test. · Myocardial perfusion imaging indicates a normal myocardial  perfusion study with no evidence of ischemia. · Impressions are consistent with a low risk study.        Procedures/EKGs:  Procedures    EKG:    Rhythm: Sinus  Rate: 67  Axis: Normal   Intervals: Normal  ST Segment: Normal     EKG Comparison: No EKG available.     Interpreted by me      15:27 EDT: Patient was updated with results and treatment plan. She is comfortable with discharge home and outpatient follow up.         Medical Decision Making:                     MDM  Number of Diagnoses or Management Options  Diagnosis management comments: The patient remained asymptomatic in the emergency department.  The patient isolated right sharp chest pain which lasted 10 or 15 minutes.  Patient's ancillary studies including 2 sets of cardiac enzymes are negative.  The patient be discharged home she is to follow with her primary care doctor as scheduled.       Amount and/or Complexity of Data Reviewed  Clinical lab tests: reviewed  Tests in the radiology section of CPT®: reviewed  Tests in the medicine section of CPT®: reviewed    Patient Progress  Patient progress: improved       Final diagnoses:   Chest wall pain        Disposition:  ED Disposition     ED Disposition Condition Comment    Discharge Stable           Documentation assistance provided by Constance Rhodes acting as scribe for Ramón Beltran DO. Information recorded by the scribe was done at my direction and has been verified and validated by me.          Constance Rhodes  07/02/21 1529       Ramón Beltran DO  07/02/21 1534

## 2021-07-04 LAB
QT INTERVAL: 382 MS
QT INTERVAL: 397 MS

## 2021-07-15 VITALS
DIASTOLIC BLOOD PRESSURE: 57 MMHG | WEIGHT: 179.5 LBS | HEIGHT: 64 IN | SYSTOLIC BLOOD PRESSURE: 146 MMHG | TEMPERATURE: 98.6 F | BODY MASS INDEX: 30.64 KG/M2 | HEART RATE: 66 BPM

## 2021-07-15 VITALS
HEART RATE: 76 BPM | DIASTOLIC BLOOD PRESSURE: 92 MMHG | HEIGHT: 65 IN | WEIGHT: 178 LBS | SYSTOLIC BLOOD PRESSURE: 156 MMHG | BODY MASS INDEX: 29.66 KG/M2

## 2021-12-16 ENCOUNTER — OFFICE VISIT (OUTPATIENT)
Dept: GASTROENTEROLOGY | Facility: CLINIC | Age: 71
End: 2021-12-16

## 2021-12-16 VITALS
WEIGHT: 182.4 LBS | BODY MASS INDEX: 31.14 KG/M2 | DIASTOLIC BLOOD PRESSURE: 67 MMHG | HEART RATE: 69 BPM | HEIGHT: 64 IN | SYSTOLIC BLOOD PRESSURE: 131 MMHG

## 2021-12-16 DIAGNOSIS — R12 HEARTBURN: ICD-10-CM

## 2021-12-16 DIAGNOSIS — K59.04 CHRONIC IDIOPATHIC CONSTIPATION: ICD-10-CM

## 2021-12-16 DIAGNOSIS — R13.10 DYSPHAGIA, UNSPECIFIED TYPE: ICD-10-CM

## 2021-12-16 DIAGNOSIS — K22.70 BARRETT'S ESOPHAGUS WITHOUT DYSPLASIA: Primary | ICD-10-CM

## 2021-12-16 PROBLEM — Z80.0 FAMILY HISTORY OF COLON CANCER: Status: ACTIVE | Noted: 2021-12-16

## 2021-12-16 PROBLEM — J93.83 SPONTANEOUS PNEUMOTHORAX: Status: ACTIVE | Noted: 2021-12-16

## 2021-12-16 PROBLEM — Z87.898 HISTORY OF DYSPHAGIA: Status: ACTIVE | Noted: 2021-12-16

## 2021-12-16 PROBLEM — G47.30 SLEEP APNEA: Status: ACTIVE | Noted: 2021-12-16

## 2021-12-16 PROBLEM — R76.8 ELEVATED ANTINUCLEAR ANTIBODY (ANA) LEVEL: Status: ACTIVE | Noted: 2021-12-16

## 2021-12-16 PROBLEM — M19.049 LOCALIZED, PRIMARY OSTEOARTHRITIS OF HAND: Status: ACTIVE | Noted: 2021-12-16

## 2021-12-16 PROBLEM — I10 ESSENTIAL HYPERTENSION: Status: ACTIVE | Noted: 2021-12-16

## 2021-12-16 PROBLEM — Z87.19 HISTORY OF DYSPHAGIA: Status: ACTIVE | Noted: 2021-12-16

## 2021-12-16 PROBLEM — Z86.79 PERSONAL HISTORY OF OTHER DISEASES OF THE CIRCULATORY SYSTEM: Status: ACTIVE | Noted: 2021-12-16

## 2021-12-16 PROBLEM — M35.00 SJOGREN'S DISEASE (HCC): Status: ACTIVE | Noted: 2021-12-16

## 2021-12-16 PROBLEM — E78.00 HIGH CHOLESTEROL: Status: ACTIVE | Noted: 2021-12-16

## 2021-12-16 PROBLEM — K21.9 GERD (GASTROESOPHAGEAL REFLUX DISEASE): Status: ACTIVE | Noted: 2021-12-16

## 2021-12-16 PROBLEM — M77.8 TENDINITIS OF FLEXOR TENDON OF RIGHT HAND: Status: ACTIVE | Noted: 2021-12-16

## 2021-12-16 PROBLEM — J98.4 LUNG DISEASE: Status: ACTIVE | Noted: 2021-12-16

## 2021-12-16 PROCEDURE — 99213 OFFICE O/P EST LOW 20 MIN: CPT | Performed by: NURSE PRACTITIONER

## 2021-12-16 RX ORDER — PANTOPRAZOLE SODIUM 40 MG/1
40 TABLET, DELAYED RELEASE ORAL DAILY
Qty: 30 TABLET | Refills: 2 | Status: SHIPPED | OUTPATIENT
Start: 2021-12-16 | End: 2022-05-13

## 2021-12-16 RX ORDER — SERTRALINE HYDROCHLORIDE 25 MG/1
25 TABLET, FILM COATED ORAL DAILY
COMMUNITY
Start: 2021-11-11 | End: 2022-05-13

## 2021-12-16 RX ORDER — EPINEPHRINE 0.3 MG/.3ML
0.3 INJECTION SUBCUTANEOUS
COMMUNITY

## 2021-12-16 NOTE — PROGRESS NOTES
Patient Name: Dk Zuniga   Visit Date: 12/16/2021   Patient ID: [id number]  Provider: TY Lobo    Sex: female  Location:  Location Address:  Location Phone: [phone]  [place] [address] [phone]    YOB: 1950  Age: 71 y.o.   Primary Care Provider Haritha Iraheta MD      Referring Provider: No ref. provider found        Chief Complaint  Follow-up    History of Present Illness    Pt initially seen in March 2020 for dysphagia and some constipation. Pt did not do scopes that were ordered d/t caregiving for mother and COVID. Patient thinks last colonoscopy about 10 years ago.  Patient follows with Dr. Cortes, diagnosed with possible pulmonary fibrosis per patient.  History of being seen here in 2016 due to esophageal spasm but was doing well on Cardizem.    Patient was last seen May 2021, she reported Linzess worked well for constipation and dysphagia was improved but occurred intermittently.    EGD colonoscopy 6/23/2021: Grade A esophagitis-biopsy with intestinal metaplasia, stomach and duodenum were normal; good prep, colonoscopy normal.  Recall EGD 3 years and colon 10 yrs    Pt states she is doing better. Pt states she changed her diet and started walking.   Pt states she's off Linzess, reports coconut oil has helped her.   Pt is not having dysphagia now.   Occasionally does have bloating.  Feels HB is under good control with the Protonix. Sx are aggravated by caffeine.    Past Medical History:   Diagnosis Date   • Asthma    • Atypical chest pain     STRESS TEST 6/21/2021 CLEARED BY DR. PANG 6/22/21 PER KATI    • BPPV (benign paroxysmal positional vertigo), right    • Dyslipidemia    • Hyperlipidemia    • Hypertension    • Obstructive sleep apnea     Compliant with CPAP   • PONV (postoperative nausea and vomiting)    • Sjogren's syndrome (HCC)        Past Surgical History:   Procedure Laterality Date   • BREAST IMPLANT SURGERY Bilateral     REMOVED    •  "CHOLECYSTECTOMY     • COLONOSCOPY     • COLONOSCOPY N/A 2021    Procedure: COLONOSCOPY;  Surgeon: Marcio Kang MD;  Location: [facility] ENDOSCOPY;  Service: Gastroenterology;  Laterality: N/A;  NORMAL COLON   • ENDOSCOPY N/A 2021    Procedure: ESOPHAGOGASTRODUODENOSCOPY WITH BIOPSIES;  Surgeon: Marcio Kang MD;  Location: [facility] ENDOSCOPY;  Service: Gastroenterology;  Laterality: N/A;  REFLUX ESOPHAGITIS   • HYSTERECTOMY     • PLEURAL SCARIFICATION Right    • TONSILLECTOMY         Allergies   Allergen Reactions   • Latex Hives and Rash   • Penicillins Rash   • Tetanus Toxoids Hives and Swelling   • Effexor [Venlafaxine] Unknown - Low Severity       Family History   Problem Relation Age of Onset   • Heart disease Mother    • Heart disease Father         qaud bi pass   • Colon cancer Paternal Grandmother    • Colon cancer Paternal Grandfather    • Malig Hyperthermia Neg Hx         Social History     Tobacco Use   • Smoking status: Former Smoker     Quit date: 1981     Years since quittin.5   • Smokeless tobacco: Never Used   Vaping Use   • Vaping Use: Never used   Substance Use Topics   • Alcohol use: Yes     Alcohol/week: 1.0 standard drink     Types: 1 Shots of liquor per week     Comment: daily-STOPPED WHEN COVID STARTED    • Drug use: Never       Objective     Vital Signs:   /67 (BP Location: Left arm, Patient Position: Sitting, Cuff Size: Adult)   Pulse 69   Ht 162.6 cm (64\")   Wt 82.7 kg (182 lb 6.4 oz)   BMI 31.31 kg/m²       Physical Exam  Constitutional:       General: The patient is not in acute distress.     Appearance: Normal appearance.   HENT:      Head: Normocephalic and atraumatic.      Nose: Nose normal.   Pulmonary:      Effort: Pulmonary effort is normal. No respiratory distress.   Abdominal:      General: Abdomen is flat.      Palpations: Abdomen is soft. There is no mass.      Tenderness: There is no abdominal tenderness. There is no guarding. "   Musculoskeletal:      Cervical back: Neck supple.      Right lower leg: No edema.      Left lower leg: No edema.   Skin:     General: Skin is warm and dry.   Neurological:      General: No focal deficit present.      Mental Status: The patient is alert and oriented to person, place, and time.      Gait: Gait normal.   Psychiatric:         Mood and Affect: Mood normal.         Speech: Speech normal.         Behavior: Behavior normal.         Thought Content: Thought content normal.     Result Review :   The following data was reviewed by: TY Lobo on 12/16/2021:              Assessment and Plan    Diagnoses and all orders for this visit:    1. Garcia's esophagus without dysplasia (Primary)  Comments:  endo - / bx + IM GE jxn    2. Chronic idiopathic constipation    3. Heartburn    4. Dysphagia, unspecified type  Comments:  resolved    Other orders  -     pantoprazole (PROTONIX) 40 MG EC tablet; Take 1 tablet by mouth Daily.  Dispense: 30 tablet; Refill: 2            Follow Up   Return if symptoms worsen or fail to improve.   Fax note to PCP  Long term risks of PPI therapy discussed, consider decreasing dose to 20 mg in future after further wt loss. (Pt is trying) Only 3 month RF sent in, pt can get future RF from PCP  Recall EGD 3 yrs/ Colon 10 yrs  Patient was given instructions and counseling regarding her condition or for health maintenance advice. Please see specific information pulled into the AVS if appropriate.

## 2022-04-05 ENCOUNTER — OFFICE VISIT (OUTPATIENT)
Dept: PULMONOLOGY | Facility: CLINIC | Age: 72
End: 2022-04-05

## 2022-04-05 VITALS
SYSTOLIC BLOOD PRESSURE: 137 MMHG | HEART RATE: 85 BPM | TEMPERATURE: 98.6 F | RESPIRATION RATE: 14 BRPM | OXYGEN SATURATION: 98 % | DIASTOLIC BLOOD PRESSURE: 80 MMHG | BODY MASS INDEX: 30.9 KG/M2 | WEIGHT: 181 LBS | HEIGHT: 64 IN

## 2022-04-05 DIAGNOSIS — Z99.89 OSA ON CPAP: ICD-10-CM

## 2022-04-05 DIAGNOSIS — J30.9 ALLERGIC RHINITIS, UNSPECIFIED SEASONALITY, UNSPECIFIED TRIGGER: ICD-10-CM

## 2022-04-05 DIAGNOSIS — R06.09 DYSPNEA ON EXERTION: ICD-10-CM

## 2022-04-05 DIAGNOSIS — G47.33 OSA ON CPAP: ICD-10-CM

## 2022-04-05 DIAGNOSIS — K21.9 GASTROESOPHAGEAL REFLUX DISEASE, UNSPECIFIED WHETHER ESOPHAGITIS PRESENT: ICD-10-CM

## 2022-04-05 DIAGNOSIS — I10 ESSENTIAL HYPERTENSION: ICD-10-CM

## 2022-04-05 DIAGNOSIS — J47.9 BRONCHIECTASIS WITHOUT ACUTE EXACERBATION: Primary | ICD-10-CM

## 2022-04-05 DIAGNOSIS — M35.00 SJOGREN'S SYNDROME, WITH UNSPECIFIED ORGAN INVOLVEMENT: ICD-10-CM

## 2022-04-05 DIAGNOSIS — J30.2 SEASONAL ALLERGIES: ICD-10-CM

## 2022-04-05 PROCEDURE — 99214 OFFICE O/P EST MOD 30 MIN: CPT | Performed by: NURSE PRACTITIONER

## 2022-04-05 RX ORDER — AZELASTINE 1 MG/ML
1-2 SPRAY, METERED NASAL 2 TIMES DAILY
COMMUNITY
Start: 2022-01-14

## 2022-04-05 RX ORDER — FLUTICASONE PROPIONATE 50 MCG
2 SPRAY, SUSPENSION (ML) NASAL DAILY
COMMUNITY
Start: 2022-01-14

## 2022-04-05 RX ORDER — ONDANSETRON 4 MG/1
4 TABLET, FILM COATED ORAL EVERY 8 HOURS PRN
COMMUNITY
Start: 2022-03-07

## 2022-04-05 NOTE — PROGRESS NOTES
Primary Care Provider  Haritha Iraheta MD     Referring Provider  No ref. provider found     Chief Complaint  Sleep Apnea (1 year f/u- CPAP Machine ) and Shortness of Breath (No more than usual )    Subjective          Dk Zuniga presents to [facility]  History of Present Illness  Dk Zuniga is a 72 y.o. female patient of Dr. Cortes here for management of Sjogren's disease, dyspnea on exertion, bronchiectasis, thyroid nodule, seasonal allergies, and tobacco abuse of cigarettes in remission.  Patient is needing surgical clearance for a total thyroidectomy.     Patient states she is doing well since her last visit.  She denies using any antibiotics or steroids for her lungs.  She denies any fevers, chills, or productive cough.  Patient continues to receive allergy shots every other week.  She is on Flonase and azelastine nasal spray.  She states that she only needs to use her albuterol inhaler on rare agents when she is working outside.  She is also needing clearance to have a total thyroidectomy for an enlarging thyroid nodule.  Patient does wear a CPAP nightly for obstructive sleep apnea with no issues.  Patient is also being seen by gastroenterology for scheduled EGD and colonoscopies every 3 years.  Patient states that they found that she does not have Garcia's esophagus, but they are keeping a close eye on her.  She is currently taking Protonix for GERD.  Due to patient's bronchiectasis, she is due for a repeat CT scan this month.  Overall, patient is doing very well and has no other concerns at this time.  She is up-to-date with her flu, pneumonia, and Covid vaccine.  She is able to perform her ADLs without difficulty.     Her history of smoking is   Tobacco Use: Medium Risk   • Smoking Tobacco Use: Former Smoker   • Smokeless Tobacco Use: Never Used   .    Review of Systems   Constitutional: Negative for chills, fatigue, fever, unexpected weight gain and  unexpected weight loss.   HENT: Negative for congestion (Nasal), hearing loss, mouth sores, nosebleeds, postnasal drip, sore throat and trouble swallowing.    Eyes: Negative for blurred vision and visual disturbance.   Respiratory: Positive for shortness of breath. Negative for apnea, cough and wheezing.         Negative for Hemoptysis     Cardiovascular: Negative for chest pain, palpitations and leg swelling.   Gastrointestinal: Negative for abdominal pain, constipation, diarrhea, nausea, vomiting and GERD.        Negative for Jaundice  Negative for Bloating  Negative for Melena   Musculoskeletal: Negative for joint swelling and myalgias.        Negative for Joint pain  Negative for Joint stiffness   Skin: Negative for color change.        Negative for cyanosis   Neurological: Negative for syncope, weakness, numbness and headache.      Sleep: Negative for Excessive daytime sleepiness  Negative for morning headaches  Negative for Snoring    Family History   Problem Relation Age of Onset   • Heart disease Mother    • Heart disease Father         qaud bi pass   • Colon cancer Paternal Grandmother    • Colon cancer Paternal Grandfather    • Malig Hyperthermia Neg Hx         Social History     Socioeconomic History   • Marital status:    Tobacco Use   • Smoking status: Former Smoker     Types: Cigarettes     Quit date: 1981     Years since quittin.8   • Smokeless tobacco: Never Used   Vaping Use   • Vaping Use: Never used   Substance and Sexual Activity   • Alcohol use: Yes     Alcohol/week: 1.0 standard drink     Types: 1 Shots of liquor per week     Comment: daily-STOPPED WHEN COVID STARTED    • Drug use: Never   • Sexual activity: Defer        Past Medical History:   Diagnosis Date   • Asthma    • Atypical chest pain     STRESS TEST 2021 CLEARED BY DR. PANG 21 PER KATI    • BPPV (benign paroxysmal positional vertigo), right    • Dyslipidemia    • Hyperlipidemia    • Hypertension    •  Obstructive sleep apnea     Compliant with CPAP   • PONV (postoperative nausea and vomiting)    • Sjogren's syndrome (HCC)         Immunization History   Administered Date(s) Administered   • COVID-19 (MODERNA) 1st, 2nd, 3rd Dose Only 02/26/2021, 04/05/2021   • COVID-19 (MODERNA) BOOSTER 02/26/2021, 04/05/2021, 10/25/2021   • Fluad Quad 65+ 09/14/2021   • Hepatitis A 03/06/2019   • Influenza LAIV (Nasal) 09/30/2019, 09/23/2020   • Pneumococcal, Unspecified 01/01/2021         Allergies   Allergen Reactions   • Latex Hives and Rash   • Penicillins Rash   • Tetanus Toxoids Hives and Swelling   • Effexor [Venlafaxine] Unknown - Low Severity          Current Outpatient Medications:   •  albuterol sulfate  (90 Base) MCG/ACT inhaler, Ventolin HFA 90 mcg/actuation inhalation HFA aerosol inhaler inhale 1 puff (90 mcg) by inhalation route every 6 hours as needed   Active, Disp: , Rfl:   •  ALLERGY SERUM INJECTION, Inject  under the skin into the appropriate area as directed 1 (One) Time. Weekly, Disp: , Rfl:   •  atorvastatin (LIPITOR) 20 MG tablet, Take 20 mg by mouth Daily., Disp: , Rfl:   •  azelastine (ASTELIN) 0.1 % nasal spray, USE 1-2 SPRAYS in each nostril TWICE DAILY AS NEEDED, Disp: , Rfl:   •  coenzyme Q10 100 MG capsule, Take 1 capsule by mouth Every Night., Disp: , Rfl:   •  cyanocobalamin (VITAMIN B-12) 1000 MCG tablet, Take 1 tablet by mouth Daily., Disp: , Rfl:   •  diltiaZEM (CARDIZEM) 30 MG tablet, Take 30 mg by mouth 3 (Three) Times a Day., Disp: , Rfl:   •  ELDERBERRY PO, Take  by mouth., Disp: , Rfl:   •  EPINEPHrine (EPIPEN) 0.3 MG/0.3ML solution auto-injector injection, 0.3 mL., Disp: , Rfl:   •  fluticasone (FLONASE) 50 MCG/ACT nasal spray, 2 sprays by Each Nare route Daily., Disp: , Rfl:   •  Ginkgo Biloba (GINKOBA PO), Take 60 mg by mouth., Disp: , Rfl:   •  hydrochlorothiazide (MICROZIDE) 12.5 MG capsule, Take 25 mg by mouth Every Morning., Disp: , Rfl:   •  losartan (COZAAR) 50 MG tablet,  Take 50 mg by mouth 2 (Two) Times a Day., Disp: , Rfl:   •  Magnesium 400 MG tablet, Take 1 tablet by mouth Daily., Disp: , Rfl:   •  MECLIZINE HCL PO, Take 25 mg by mouth., Disp: , Rfl:   •  Omega-3 Fatty Acids (fish oil) 500 MG capsule capsule, Take 1 capsule by mouth Daily., Disp: , Rfl:   •  ondansetron (ZOFRAN) 4 MG tablet, Take 4 mg by mouth Every 8 (Eight) Hours As Needed. for nausea, Disp: , Rfl:   •  pantoprazole (PROTONIX) 40 MG EC tablet, Take 1 tablet by mouth Daily., Disp: 30 tablet, Rfl: 2  •  sertraline (ZOLOFT) 25 MG tablet, Take 25 mg by mouth Daily., Disp: , Rfl:   •  Turmeric (QC TUMERIC COMPLEX PO), Take 1 capsule by mouth Daily., Disp: , Rfl:   •  vitamin D (ERGOCALCIFEROL) 1.25 MG (02190 UT) capsule capsule, Take 50,000 Units by mouth 1 (One) Time Per Week., Disp: , Rfl:   •  hydroCHLOROthiazide (HYDRODIURIL) 25 MG tablet, Take 25 mg by mouth Daily., Disp: , Rfl:      Objective   Physical Exam  Constitutional:       General: She is not in acute distress.     Appearance: Normal appearance. She is overweight.   HENT:      Right Ear: Hearing normal.      Left Ear: Hearing normal.      Nose: No nasal tenderness or congestion.      Mouth/Throat:      Mouth: Mucous membranes are moist. No oral lesions.   Eyes:      Extraocular Movements: Extraocular movements intact.      Pupils: Pupils are equal, round, and reactive to light.   Neck:      Thyroid: No thyroid mass or thyromegaly.   Cardiovascular:      Rate and Rhythm: Normal rate and regular rhythm.      Pulses: Normal pulses.      Heart sounds: Normal heart sounds. No murmur heard.  Pulmonary:      Effort: Pulmonary effort is normal.      Breath sounds: Normal breath sounds. No wheezing, rhonchi or rales.   Chest:   Breasts:      Right: No axillary adenopathy.       Abdominal:      General: Bowel sounds are normal. There is no distension.      Palpations: Abdomen is soft.      Tenderness: There is no abdominal tenderness.   Musculoskeletal:       "Cervical back: Neck supple.      Right lower leg: No edema.      Left lower leg: No edema.   Lymphadenopathy:      Cervical: No cervical adenopathy.      Upper Body:      Right upper body: No axillary adenopathy.   Skin:     General: Skin is warm and dry.      Findings: No lesion or rash.   Neurological:      General: No focal deficit present.      Mental Status: She is alert and oriented to person, place, and time.      Cranial Nerves: Cranial nerves are intact.   Psychiatric:         Mood and Affect: Affect normal. Mood is not anxious or depressed.         Vital Signs:   /80 (BP Location: Left arm, Patient Position: Sitting, Cuff Size: Adult)   Pulse 85   Temp 98.6 °F (37 °C) (Infrared)   Resp 14   Ht 162.6 cm (64\")   Wt 82.1 kg (181 lb)   SpO2 98% Comment: room air  BMI 31.07 kg/m²        Result Review :     CMP    CMP 7/2/21   Glucose 96   BUN 14   Creatinine 0.88   eGFR Non African Am 63   Sodium 130 (A)   Potassium 4.1   Chloride 96 (A)   Calcium 9.1   Albumin 4.30   Total Bilirubin 0.4   Alkaline Phosphatase 97   AST (SGOT) 21   ALT (SGPT) 22   (A) Abnormal value       Comments are available for some flowsheets but are not being displayed.           CBC w/diff    CBC w/Diff 7/2/21   WBC 14.13 (A)   RBC 4.88   Hemoglobin 15.1   Hematocrit 43.1   MCV 88.3   MCH 30.9   MCHC 35.0   RDW 13.0   Platelets 248   Neutrophil Rel % 78.8 (A)   Immature Granulocyte Rel % 0.4   Lymphocyte Rel % 13.0 (A)   Monocyte Rel % 6.4   Eosinophil Rel % 1.0   Basophil Rel % 0.4   (A) Abnormal value            Data reviewed: Radiologic studies Chest CT 4/20/2021, chest x-ray 7/2/2021 and PHIL Gandhi last office note   Procedures        Assessment and Plan    Diagnoses and all orders for this visit:    1. Bronchiectasis without acute exacerbation (HCC) (Primary)  -     CT Chest Without Contrast; Future    2. Sjogren's syndrome, with unspecified organ involvement (HCC)    3. Dyspnea on exertion    4. PATRICIA on " CPAP    5. Essential hypertension    6. Gastroesophageal reflux disease, unspecified whether esophagitis present    7. Allergic rhinitis, unspecified seasonality, unspecified trigger    8. Seasonal allergies    9.  Continue albuterol as needed.  10.  Continue allergy shots per allergist.  11.  Continue Flonase and azelastine nasal sprays.  12.  Encouraged patient to try to stay as active as possible.  Recommended 30 minutes of daily exercise.  13.  Follow-up with sleep medicine as scheduled for management of obstructive sleep apnea and CPAP.  14.  Follow-up with Dr. Cortes in 1 year, sooner if needed.        Follow Up   Return in about 1 year (around 4/5/2023) for Recheck with Sebastian.  Patient was given instructions and counseling regarding her condition or for health maintenance advice. Please see specific information pulled into the AVS if appropriate.

## 2022-04-05 NOTE — PATIENT INSTRUCTIONS
Sleep Apnea  Sleep apnea is a condition in which breathing pauses or becomes shallow during sleep. Episodes of sleep apnea usually last 10 seconds or longer, and they may occur as many as 20 times an hour. Sleep apnea disrupts your sleep and keeps your body from getting the rest that it needs. This condition can increase your risk of certain health problems, including:  Heart attack.  Stroke.  Obesity.  Diabetes.  Heart failure.  Irregular heartbeat.  What are the causes?  There are three kinds of sleep apnea:  Obstructive sleep apnea. This kind is caused by a blocked or collapsed airway.  Central sleep apnea. This kind happens when the part of the brain that controls breathing does not send the correct signals to the muscles that control breathing.  Mixed sleep apnea. This is a combination of obstructive and central sleep apnea.  The most common cause of this condition is a collapsed or blocked airway. An airway can collapse or become blocked if:  Your throat muscles are abnormally relaxed.  Your tongue and tonsils are larger than normal.  You are overweight.  Your airway is smaller than normal.  What increases the risk?  You are more likely to develop this condition if you:  Are overweight.  Smoke.  Have a smaller than normal airway.  Are elderly.  Are male.  Drink alcohol.  Take sedatives or tranquilizers.  Have a family history of sleep apnea.  What are the signs or symptoms?  Symptoms of this condition include:  Trouble staying asleep.  Daytime sleepiness and tiredness.  Irritability.  Loud snoring.  Morning headaches.  Trouble concentrating.  Forgetfulness.  Decreased interest in sex.  Unexplained sleepiness.  Mood swings.  Personality changes.  Feelings of depression.  Waking up often during the night to urinate.  Dry mouth.  Sore throat.  How is this diagnosed?  This condition may be diagnosed with:  A medical history.  A physical exam.  A series of tests that are done while you are sleeping (sleep study).  These tests are usually done in a sleep lab, but they may also be done at home.  How is this treated?  Treatment for this condition aims to restore normal breathing and to ease symptoms during sleep. It may involve managing health issues that can affect breathing, such as high blood pressure or obesity. Treatment may include:  Sleeping on your side.  Using a decongestant if you have nasal congestion.  Avoiding the use of depressants, including alcohol, sedatives, and narcotics.  Losing weight if you are overweight.  Making changes to your diet.  Quitting smoking.  Using a device to open your airway while you sleep, such as:  An oral appliance. This is a custom-made mouthpiece that shifts your lower jaw forward.  A continuous positive airway pressure (CPAP) device. This device blows air through a mask when you breathe out (exhale).  A nasal expiratory positive airway pressure (EPAP) device. This device has valves that you put into each nostril.  A bi-level positive airway pressure (BPAP) device. This device blows air through a mask when you breathe in (inhale) and breathe out (exhale).  Having surgery if other treatments do not work. During surgery, excess tissue is removed to create a wider airway.  It is important to get treatment for sleep apnea. Without treatment, this condition can lead to:  High blood pressure.  Coronary artery disease.  In men, an inability to achieve or maintain an erection (impotence).  Reduced thinking abilities.  Follow these instructions at home:  Lifestyle  Make any lifestyle changes that your health care provider recommends.  Eat a healthy, well-balanced diet.  Take steps to lose weight if you are overweight.  Avoid using depressants, including alcohol, sedatives, and narcotics.  Do not use any products that contain nicotine or tobacco, such as cigarettes, e-cigarettes, and chewing tobacco. If you need help quitting, ask your health care provider.  General instructions  Take  over-the-counter and prescription medicines only as told by your health care provider.  If you were given a device to open your airway while you sleep, use it only as told by your health care provider.  If you are having surgery, make sure to tell your health care provider you have sleep apnea. You may need to bring your device with you.  Keep all follow-up visits as told by your health care provider. This is important.  Contact a health care provider if:  The device that you received to open your airway during sleep is uncomfortable or does not seem to be working.  Your symptoms do not improve.  Your symptoms get worse.  Get help right away if:  You develop:  Chest pain.  Shortness of breath.  Discomfort in your back, arms, or stomach.  You have:  Trouble speaking.  Weakness on one side of your body.  Drooping in your face.  These symptoms may represent a serious problem that is an emergency. Do not wait to see if the symptoms will go away. Get medical help right away. Call your local emergency services (911 in the [place] Do not drive yourself to the hospital.  Summary  Sleep apnea is a condition in which breathing pauses or becomes shallow during sleep.  The most common cause is a collapsed or blocked airway.  The goal of treatment is to restore normal breathing and to ease symptoms during sleep.  This information is not intended to replace advice given to you by your health care provider. Make sure you discuss any questions you have with your health care provider.  Document Revised: 06/03/2020 Document Reviewed: 08/13/2019  [employer] Patient Education © 2021 [employer]

## 2022-04-20 ENCOUNTER — HOSPITAL ENCOUNTER (OUTPATIENT)
Dept: CT IMAGING | Facility: HOSPITAL | Age: 72
Discharge: HOME | End: 2022-04-20
Admitting: NURSE PRACTITIONER

## 2022-04-20 DIAGNOSIS — J47.9 BRONCHIECTASIS WITHOUT ACUTE EXACERBATION: ICD-10-CM

## 2022-04-20 PROCEDURE — 71250 CT THORAX DX C-: CPT

## 2022-05-10 ENCOUNTER — PREP FOR SURGERY (OUTPATIENT)
Dept: OTHER | Facility: HOSPITAL | Age: 72
End: 2022-05-10

## 2022-05-10 DIAGNOSIS — D68.9 COAGULATION DEFECT, UNSPECIFIED: ICD-10-CM

## 2022-05-10 DIAGNOSIS — E04.1 RIGHT THYROID NODULE: Primary | ICD-10-CM

## 2022-05-10 DIAGNOSIS — Z01.818 PREOP TESTING: Primary | ICD-10-CM

## 2022-05-10 DIAGNOSIS — E04.2 MULTIPLE THYROID NODULES: ICD-10-CM

## 2022-05-10 DIAGNOSIS — R09.89 FOREIGN BODY SENSATION IN THROAT: ICD-10-CM

## 2022-05-10 DIAGNOSIS — E04.9 ENLARGEMENT OF THYROID: ICD-10-CM

## 2022-05-10 DIAGNOSIS — R13.13 CRICOPHARYNGEAL DYSPHAGIA: ICD-10-CM

## 2022-05-11 PROBLEM — E04.9 ENLARGEMENT OF THYROID: Status: ACTIVE | Noted: 2022-05-11

## 2022-05-11 PROBLEM — R09.89 FOREIGN BODY SENSATION IN THROAT: Status: ACTIVE | Noted: 2022-05-11

## 2022-05-11 PROBLEM — R09.A2 FOREIGN BODY SENSATION IN THROAT: Status: ACTIVE | Noted: 2022-05-11

## 2022-05-11 PROBLEM — E04.2 MULTIPLE THYROID NODULES: Status: ACTIVE | Noted: 2022-05-11

## 2022-05-11 PROBLEM — R13.13 CRICOPHARYNGEAL DYSPHAGIA: Status: ACTIVE | Noted: 2022-05-11

## 2022-05-13 ENCOUNTER — PRE-ADMISSION TESTING (OUTPATIENT)
Dept: PREADMISSION TESTING | Facility: HOSPITAL | Age: 72
End: 2022-05-13

## 2022-05-13 VITALS
WEIGHT: 181 LBS | HEART RATE: 76 BPM | OXYGEN SATURATION: 96 % | HEIGHT: 65 IN | SYSTOLIC BLOOD PRESSURE: 138 MMHG | TEMPERATURE: 99.1 F | BODY MASS INDEX: 30.16 KG/M2 | DIASTOLIC BLOOD PRESSURE: 72 MMHG

## 2022-05-13 DIAGNOSIS — Z01.818 PREOP TESTING: ICD-10-CM

## 2022-05-13 DIAGNOSIS — D68.9 COAGULATION DEFECT, UNSPECIFIED: ICD-10-CM

## 2022-05-13 LAB
ANION GAP SERPL CALCULATED.3IONS-SCNC: 11.4 MMOL/L (ref 5–15)
APTT PPP: 36.4 SECONDS (ref 24.2–34.2)
BASOPHILS # BLD AUTO: 0.06 10*3/MM3 (ref 0–0.2)
BASOPHILS NFR BLD AUTO: 0.8 % (ref 0–1.5)
BUN SERPL-MCNC: 13 MG/DL (ref 8–23)
BUN/CREAT SERPL: 15.1 (ref 7–25)
CALCIUM SPEC-SCNC: 10.1 MG/DL (ref 8.6–10.5)
CHLORIDE SERPL-SCNC: 102 MMOL/L (ref 98–107)
CO2 SERPL-SCNC: 22.6 MMOL/L (ref 22–29)
CREAT SERPL-MCNC: 0.86 MG/DL (ref 0.57–1)
DEPRECATED RDW RBC AUTO: 42.5 FL (ref 37–54)
EGFRCR SERPLBLD CKD-EPI 2021: 71.9 ML/MIN/1.73
EOSINOPHIL # BLD AUTO: 0.4 10*3/MM3 (ref 0–0.4)
EOSINOPHIL NFR BLD AUTO: 5 % (ref 0.3–6.2)
ERYTHROCYTE [DISTWIDTH] IN BLOOD BY AUTOMATED COUNT: 13.2 % (ref 12.3–15.4)
GLUCOSE SERPL-MCNC: 101 MG/DL (ref 65–99)
HCT VFR BLD AUTO: 42.7 % (ref 34–46.6)
HGB BLD-MCNC: 15.1 G/DL (ref 12–15.9)
IMM GRANULOCYTES # BLD AUTO: 0.02 10*3/MM3 (ref 0–0.05)
IMM GRANULOCYTES NFR BLD AUTO: 0.3 % (ref 0–0.5)
INR PPP: 0.96 (ref 0.86–1.15)
LYMPHOCYTES # BLD AUTO: 1.49 10*3/MM3 (ref 0.7–3.1)
LYMPHOCYTES NFR BLD AUTO: 18.7 % (ref 19.6–45.3)
MCH RBC QN AUTO: 31 PG (ref 26.6–33)
MCHC RBC AUTO-ENTMCNC: 35.4 G/DL (ref 31.5–35.7)
MCV RBC AUTO: 87.7 FL (ref 79–97)
MONOCYTES # BLD AUTO: 0.67 10*3/MM3 (ref 0.1–0.9)
MONOCYTES NFR BLD AUTO: 8.4 % (ref 5–12)
NEUTROPHILS NFR BLD AUTO: 5.32 10*3/MM3 (ref 1.7–7)
NEUTROPHILS NFR BLD AUTO: 66.8 % (ref 42.7–76)
NRBC BLD AUTO-RTO: 0 /100 WBC (ref 0–0.2)
PLATELET # BLD AUTO: 289 10*3/MM3 (ref 140–450)
PMV BLD AUTO: 9.3 FL (ref 6–12)
POTASSIUM SERPL-SCNC: 4.1 MMOL/L (ref 3.5–5.2)
PROTHROMBIN TIME: 12.9 SECONDS (ref 11.8–14.9)
RBC # BLD AUTO: 4.87 10*6/MM3 (ref 3.77–5.28)
SODIUM SERPL-SCNC: 136 MMOL/L (ref 136–145)
WBC NRBC COR # BLD: 7.96 10*3/MM3 (ref 3.4–10.8)

## 2022-05-13 PROCEDURE — 85610 PROTHROMBIN TIME: CPT

## 2022-05-13 PROCEDURE — 85730 THROMBOPLASTIN TIME PARTIAL: CPT

## 2022-05-13 PROCEDURE — 36415 COLL VENOUS BLD VENIPUNCTURE: CPT

## 2022-05-13 PROCEDURE — 85025 COMPLETE CBC W/AUTO DIFF WBC: CPT

## 2022-05-13 PROCEDURE — 80048 BASIC METABOLIC PNL TOTAL CA: CPT

## 2022-05-13 RX ORDER — THIAMINE HCL 100 MG
TABLET ORAL 3 TIMES WEEKLY
COMMUNITY

## 2022-05-13 RX ORDER — MULTIPLE VITAMINS W/ MINERALS TAB 9MG-400MCG
1 TAB ORAL DAILY
COMMUNITY

## 2022-05-13 RX ORDER — PANTOPRAZOLE SODIUM 20 MG/1
20 TABLET, DELAYED RELEASE ORAL DAILY
COMMUNITY

## 2022-05-13 NOTE — DISCHARGE INSTRUCTIONS
IMPORTANT INSTRUCTIONS - PRE-ADMISSION TESTING  DO NOT EAT OR CHEW anything after midnight the night before your procedure.    You may have CLEAR liquids up to _2_ hours prior to ARRIVAL time.   Take the following medications the morning of your procedure with JUST A SIP OF WATER:  __PANTOPRAZOLE, AZELASTINE, FLONASE, IF NEEDED DILTIAZEM, ZOFRAN, MECLIZINE_____________  DO NOT BRING your medications to the hospital with you, UNLESS something has changed since your PRE-Admission Testing appointment.  Hold all vitamins, supplements, and NSAIDS (Non- steroidal anti-inflammatory meds) for one week prior to surgery (you MAY take Tylenol or Acetaminophen).  If you are diabetic, check your blood sugar the morning of your procedure. If it is less than 70 or if you are feeling symptomatic, call the following number for further instructions: [phone]___.  Use your inhalers/nebulizers as usual, the morning of your procedure. BRING YOUR INHALERS with you.   Bring your CPAP or BIPAP to hospital, ONLY IF YOU WILL BE SPENDING THE NIGHT.   Make sure you have a ride home and have someone who will stay with you the day of your procedure after you go home.  If you have any questions, please call your Pre-Admission Testing Nurse, __ANGÉLICA ____ at [phone]____.   Per anesthesia request, do not smoke for 24 hours before your procedure or as instructed by your surgeon.     Clear Liquid Diet        Find out when you need to start a clear liquid diet.   Think of “clear liquids” as anything you could read a newspaper through. This includes things like water, broth, sports drinks, or tea WITHOUT any kind of milk or cream.           Once you are told to start a clear liquid diet, only drink these things until 2 hours before arrival to the hospital or when the hospital says to stop. Total volume limitation: 8 oz.       Clear liquids you CAN drink:   Water   Clear broth: beef, chicken, vegetable, or bone broth with nothing in it    Gatorade   Lemonade or Joaquim-aid   Soda   Tea, coffee (NO cream or honey)   Jell-O (without fruit)   Popsicles (without fruit or cream)   Italian ices   Juice without pulp: apple, white, grape   You may use salt, pepper, and sugar    Do NOT drink:   Milk or cream   Soy milk, almond milk, coconut milk, or other non-dairy drinks and   creamers   Milkshakes or smoothies   Tomato juice   Orange juice   Grapefruit juice   Cream soups or any other than broth         Clear Liquid Diet:  Do NOT eat any solid food.  Do NOT eat or suck on mints or candy.  Do NOT chew gum.  Do NOT drink thick liquids like milk or juice with pulp in it.  Do NOT add milk, cream, or anything like soy milk or almond milk to coffee or tea.        PREOPERATIVE (BEFORE SURGERY)              BATHING INSTRUCTIONS  Instructions:    You will need to shower 1 time utilizing the soap provided; at the times indicated   below:     MORNING OF SURGERY      Wash your hair and face with normal shampoo and soap, rinse it well before using the surgical soap.      In the shower, wet the skin completely with water from your neck to your feet. Apply the cleanser to your   body ONLY FROM THE NECK TO YOUR FEET.     Do NOT USE THE CLEANSER ON YOUR FACE, HEAD, OR GENITAL (PRIVATE) AREAS.   Keep it out of your eyes, ears, and mouth because of the risk of injury to those areas.      Scrub with a clean washcloth for each bath utilizing the soap provided from the top of your body to the   bottom starting at the neck area.      Pay close attention to your armpits, groin area, and the site of surgery.      Wash your body gently for 5 minutes. Stand outside the stream or turn off the water while scrubbing your   body. Do NOT wash with your regular soap after the surgical cleanser is used.      RINSE THE CLEANSER OFF COMPLETELY with plenty of water. Rinse the area again thoroughly.      Dry off with a clean towel. The surgical soap can cause dryness; however do NOT APPLY  LOTION,   CREAM, POWDER, and/or DEODORANT AFTER SHOWERING.     Be sure to where clean clothes after showering.      Ensure CLEAN BED LINENS AFTER FIRST wash with the surgical soap.      NO PETS ALLOWED IN THE BED with you after utilizing the surgical soap.

## 2022-05-16 ENCOUNTER — ANESTHESIA EVENT (OUTPATIENT)
Dept: PERIOP | Facility: HOSPITAL | Age: 72
End: 2022-05-16

## 2022-05-18 ENCOUNTER — HOSPITAL ENCOUNTER (OUTPATIENT)
Facility: HOSPITAL | Age: 72
Discharge: HOME | End: 2022-05-19
Attending: OTOLARYNGOLOGY | Admitting: OTOLARYNGOLOGY

## 2022-05-18 ENCOUNTER — ANESTHESIA (OUTPATIENT)
Dept: PERIOP | Facility: HOSPITAL | Age: 72
End: 2022-05-18

## 2022-05-18 DIAGNOSIS — R13.13 CRICOPHARYNGEAL DYSPHAGIA: ICD-10-CM

## 2022-05-18 DIAGNOSIS — E04.9 ENLARGEMENT OF THYROID: ICD-10-CM

## 2022-05-18 DIAGNOSIS — R09.89 FOREIGN BODY SENSATION IN THROAT: ICD-10-CM

## 2022-05-18 DIAGNOSIS — E04.2 MULTIPLE THYROID NODULES: ICD-10-CM

## 2022-05-18 DIAGNOSIS — E04.1 RIGHT THYROID NODULE: ICD-10-CM

## 2022-05-18 PROBLEM — R09.A2 FOREIGN BODY SENSATION IN THROAT: Status: RESOLVED | Noted: 2022-05-11 | Resolved: 2022-05-18

## 2022-05-18 LAB
CALCIUM SPEC-SCNC: 10.1 MG/DL (ref 8.6–10.5)
PTH-INTACT SERPL-MCNC: 41.6 PG/ML (ref 15–65)

## 2022-05-18 PROCEDURE — 25010000002 ONDANSETRON PER 1 MG: Performed by: OTOLARYNGOLOGY

## 2022-05-18 PROCEDURE — 25010000002 ONDANSETRON PER 1 MG

## 2022-05-18 PROCEDURE — A9270 NON-COVERED ITEM OR SERVICE: HCPCS | Performed by: OTOLARYNGOLOGY

## 2022-05-18 PROCEDURE — 94799 UNLISTED PULMONARY SVC/PX: CPT

## 2022-05-18 PROCEDURE — 25010000002 MIDAZOLAM PER 1 MG: Performed by: ANESTHESIOLOGY

## 2022-05-18 PROCEDURE — 83970 ASSAY OF PARATHORMONE: CPT | Performed by: OTOLARYNGOLOGY

## 2022-05-18 PROCEDURE — 63710000001 ACETAMINOPHEN 500 MG TABLET: Performed by: ANESTHESIOLOGY

## 2022-05-18 PROCEDURE — 25010000002 PHENYLEPHRINE 10 MG/ML SOLUTION 1 ML VIAL

## 2022-05-18 PROCEDURE — A9270 NON-COVERED ITEM OR SERVICE: HCPCS | Performed by: ANESTHESIOLOGY

## 2022-05-18 PROCEDURE — G0378 HOSPITAL OBSERVATION PER HR: HCPCS

## 2022-05-18 PROCEDURE — 82310 ASSAY OF CALCIUM: CPT | Performed by: OTOLARYNGOLOGY

## 2022-05-18 PROCEDURE — 63710000001 CALCIUM CARBONATE-VITAMIN D3 600-400 MG-UNIT TABLET: Performed by: OTOLARYNGOLOGY

## 2022-05-18 PROCEDURE — 88307 TISSUE EXAM BY PATHOLOGIST: CPT | Performed by: OTOLARYNGOLOGY

## 2022-05-18 PROCEDURE — 63710000001 BACITRACIN 500 UNIT/GM OINTMENT 14 G TUBE: Performed by: OTOLARYNGOLOGY

## 2022-05-18 PROCEDURE — 63710000001 ATORVASTATIN 20 MG TABLET: Performed by: OTOLARYNGOLOGY

## 2022-05-18 PROCEDURE — 25010000002 DEXAMETHASONE PER 1 MG

## 2022-05-18 PROCEDURE — 25010000002 FENTANYL CITRATE (PF) 50 MCG/ML SOLUTION

## 2022-05-18 PROCEDURE — 94761 N-INVAS EAR/PLS OXIMETRY MLT: CPT

## 2022-05-18 PROCEDURE — 63710000001 ACETAMINOPHEN 325 MG TABLET: Performed by: OTOLARYNGOLOGY

## 2022-05-18 PROCEDURE — 63710000001 LOSARTAN 50 MG TABLET: Performed by: OTOLARYNGOLOGY

## 2022-05-18 PROCEDURE — 88331 PATH CONSLTJ SURG 1 BLK 1SPC: CPT | Performed by: PATHOLOGY

## 2022-05-18 PROCEDURE — 25010000002 PROPOFOL 10 MG/ML EMULSION

## 2022-05-18 DEVICE — LIGACLIP MCA MULTIPLE CLIP APPLIERS, 20 SMALL CLIPS
Type: IMPLANTABLE DEVICE | Site: NECK | Status: FUNCTIONAL
Brand: LIGACLIP

## 2022-05-18 RX ORDER — OXYCODONE HYDROCHLORIDE 5 MG/1
5 TABLET ORAL
Status: DISCONTINUED | OUTPATIENT
Start: 2022-05-18 | End: 2022-05-19 | Stop reason: HOSPADM

## 2022-05-18 RX ORDER — AZELASTINE 1 MG/ML
1 SPRAY, METERED NASAL 2 TIMES DAILY PRN
Status: DISCONTINUED | OUTPATIENT
Start: 2022-05-18 | End: 2022-05-19 | Stop reason: HOSPADM

## 2022-05-18 RX ORDER — ALBUTEROL SULFATE 2.5 MG/3ML
2.5 SOLUTION RESPIRATORY (INHALATION) EVERY 6 HOURS PRN
Status: DISCONTINUED | OUTPATIENT
Start: 2022-05-18 | End: 2022-05-19 | Stop reason: HOSPADM

## 2022-05-18 RX ORDER — LOSARTAN POTASSIUM 50 MG/1
50 TABLET ORAL 2 TIMES DAILY
Status: DISCONTINUED | OUTPATIENT
Start: 2022-05-18 | End: 2022-05-19 | Stop reason: HOSPADM

## 2022-05-18 RX ORDER — DEXMEDETOMIDINE HYDROCHLORIDE 100 UG/ML
INJECTION, SOLUTION INTRAVENOUS AS NEEDED
Status: DISCONTINUED | OUTPATIENT
Start: 2022-05-18 | End: 2022-05-18 | Stop reason: SURG

## 2022-05-18 RX ORDER — MIDAZOLAM HYDROCHLORIDE 1 MG/ML
2 INJECTION INTRAMUSCULAR; INTRAVENOUS ONCE
Status: COMPLETED | OUTPATIENT
Start: 2022-05-18 | End: 2022-05-18

## 2022-05-18 RX ORDER — SODIUM CHLORIDE 0.9 % (FLUSH) 0.9 %
10 SYRINGE (ML) INJECTION EVERY 12 HOURS SCHEDULED
Status: DISCONTINUED | OUTPATIENT
Start: 2022-05-18 | End: 2022-05-19 | Stop reason: HOSPADM

## 2022-05-18 RX ORDER — KETAMINE HYDROCHLORIDE 50 MG/ML
INJECTION, SOLUTION, CONCENTRATE INTRAMUSCULAR; INTRAVENOUS AS NEEDED
Status: DISCONTINUED | OUTPATIENT
Start: 2022-05-18 | End: 2022-05-18 | Stop reason: SURG

## 2022-05-18 RX ORDER — LIDOCAINE HYDROCHLORIDE 20 MG/ML
INJECTION, SOLUTION EPIDURAL; INFILTRATION; INTRACAUDAL; PERINEURAL AS NEEDED
Status: DISCONTINUED | OUTPATIENT
Start: 2022-05-18 | End: 2022-05-18 | Stop reason: SURG

## 2022-05-18 RX ORDER — PROMETHAZINE HYDROCHLORIDE 25 MG/1
25 TABLET ORAL ONCE AS NEEDED
Status: DISCONTINUED | OUTPATIENT
Start: 2022-05-18 | End: 2022-05-19 | Stop reason: HOSPADM

## 2022-05-18 RX ORDER — ACETAMINOPHEN 500 MG
1000 TABLET ORAL ONCE
Status: COMPLETED | OUTPATIENT
Start: 2022-05-18 | End: 2022-05-18

## 2022-05-18 RX ORDER — MORPHINE SULFATE 2 MG/ML
2 INJECTION, SOLUTION INTRAMUSCULAR; INTRAVENOUS EVERY 4 HOURS PRN
Status: DISCONTINUED | OUTPATIENT
Start: 2022-05-18 | End: 2022-05-19 | Stop reason: HOSPADM

## 2022-05-18 RX ORDER — SUCCINYLCHOLINE/SOD CL,ISO/PF 100 MG/5ML
SYRINGE (ML) INTRAVENOUS AS NEEDED
Status: DISCONTINUED | OUTPATIENT
Start: 2022-05-18 | End: 2022-05-18 | Stop reason: SURG

## 2022-05-18 RX ORDER — ONDANSETRON 2 MG/ML
4 INJECTION INTRAMUSCULAR; INTRAVENOUS ONCE AS NEEDED
Status: DISCONTINUED | OUTPATIENT
Start: 2022-05-18 | End: 2022-05-19 | Stop reason: HOSPADM

## 2022-05-18 RX ORDER — MAGNESIUM HYDROXIDE 1200 MG/15ML
LIQUID ORAL AS NEEDED
Status: DISCONTINUED | OUTPATIENT
Start: 2022-05-18 | End: 2022-05-18 | Stop reason: HOSPADM

## 2022-05-18 RX ORDER — ROCURONIUM BROMIDE 10 MG/ML
INJECTION, SOLUTION INTRAVENOUS AS NEEDED
Status: DISCONTINUED | OUTPATIENT
Start: 2022-05-18 | End: 2022-05-18 | Stop reason: SURG

## 2022-05-18 RX ORDER — SODIUM CHLORIDE, SODIUM LACTATE, POTASSIUM CHLORIDE, CALCIUM CHLORIDE 600; 310; 30; 20 MG/100ML; MG/100ML; MG/100ML; MG/100ML
9 INJECTION, SOLUTION INTRAVENOUS CONTINUOUS PRN
Status: DISCONTINUED | OUTPATIENT
Start: 2022-05-18 | End: 2022-05-18 | Stop reason: HOSPADM

## 2022-05-18 RX ORDER — ACETAMINOPHEN 325 MG/1
650 TABLET ORAL EVERY 4 HOURS PRN
Status: DISCONTINUED | OUTPATIENT
Start: 2022-05-18 | End: 2022-05-19 | Stop reason: HOSPADM

## 2022-05-18 RX ORDER — SODIUM CHLORIDE 450 MG/100ML
75 INJECTION, SOLUTION INTRAVENOUS CONTINUOUS
Status: DISCONTINUED | OUTPATIENT
Start: 2022-05-18 | End: 2022-05-19 | Stop reason: HOSPADM

## 2022-05-18 RX ORDER — HYDROCODONE BITARTRATE AND ACETAMINOPHEN 7.5; 325 MG/1; MG/1
1 TABLET ORAL EVERY 4 HOURS PRN
Status: DISCONTINUED | OUTPATIENT
Start: 2022-05-18 | End: 2022-05-19 | Stop reason: HOSPADM

## 2022-05-18 RX ORDER — ONDANSETRON 4 MG/1
4 TABLET, FILM COATED ORAL EVERY 8 HOURS PRN
Status: DISCONTINUED | OUTPATIENT
Start: 2022-05-18 | End: 2022-05-19 | Stop reason: HOSPADM

## 2022-05-18 RX ORDER — GLYCOPYRROLATE 0.2 MG/ML
0.2 INJECTION INTRAMUSCULAR; INTRAVENOUS
Status: COMPLETED | OUTPATIENT
Start: 2022-05-18 | End: 2022-05-18

## 2022-05-18 RX ORDER — FENTANYL CITRATE 50 UG/ML
INJECTION, SOLUTION INTRAMUSCULAR; INTRAVENOUS AS NEEDED
Status: DISCONTINUED | OUTPATIENT
Start: 2022-05-18 | End: 2022-05-18 | Stop reason: SURG

## 2022-05-18 RX ORDER — FLUTICASONE PROPIONATE 50 MCG
2 SPRAY, SUSPENSION (ML) NASAL DAILY
Status: DISCONTINUED | OUTPATIENT
Start: 2022-05-18 | End: 2022-05-18 | Stop reason: SDUPTHER

## 2022-05-18 RX ORDER — HYDROCHLOROTHIAZIDE 12.5 MG/1
12.5 CAPSULE, GELATIN COATED ORAL EVERY MORNING
Status: DISCONTINUED | OUTPATIENT
Start: 2022-05-19 | End: 2022-05-19 | Stop reason: HOSPADM

## 2022-05-18 RX ORDER — PROPOFOL 10 MG/ML
VIAL (ML) INTRAVENOUS AS NEEDED
Status: DISCONTINUED | OUTPATIENT
Start: 2022-05-18 | End: 2022-05-18 | Stop reason: SURG

## 2022-05-18 RX ORDER — SODIUM CHLORIDE 0.9 % (FLUSH) 0.9 %
10 SYRINGE (ML) INJECTION AS NEEDED
Status: DISCONTINUED | OUTPATIENT
Start: 2022-05-18 | End: 2022-05-19 | Stop reason: HOSPADM

## 2022-05-18 RX ORDER — PROMETHAZINE HYDROCHLORIDE 25 MG/1
25 SUPPOSITORY RECTAL ONCE AS NEEDED
Status: DISCONTINUED | OUTPATIENT
Start: 2022-05-18 | End: 2022-05-19 | Stop reason: HOSPADM

## 2022-05-18 RX ORDER — EPHEDRINE SULFATE 50 MG/ML
INJECTION, SOLUTION INTRAVENOUS AS NEEDED
Status: DISCONTINUED | OUTPATIENT
Start: 2022-05-18 | End: 2022-05-18 | Stop reason: SURG

## 2022-05-18 RX ORDER — PHENYLEPHRINE HCL IN 0.9% NACL 1 MG/10 ML
SYRINGE (ML) INTRAVENOUS AS NEEDED
Status: DISCONTINUED | OUTPATIENT
Start: 2022-05-18 | End: 2022-05-18 | Stop reason: SURG

## 2022-05-18 RX ORDER — DEXAMETHASONE SODIUM PHOSPHATE 4 MG/ML
INJECTION, SOLUTION INTRA-ARTICULAR; INTRALESIONAL; INTRAMUSCULAR; INTRAVENOUS; SOFT TISSUE AS NEEDED
Status: DISCONTINUED | OUTPATIENT
Start: 2022-05-18 | End: 2022-05-18 | Stop reason: SURG

## 2022-05-18 RX ORDER — GINSENG 100 MG
CAPSULE ORAL AS NEEDED
Status: DISCONTINUED | OUTPATIENT
Start: 2022-05-18 | End: 2022-05-18 | Stop reason: HOSPADM

## 2022-05-18 RX ORDER — ATORVASTATIN CALCIUM 20 MG/1
20 TABLET, FILM COATED ORAL NIGHTLY
Status: DISCONTINUED | OUTPATIENT
Start: 2022-05-18 | End: 2022-05-19 | Stop reason: HOSPADM

## 2022-05-18 RX ORDER — ONDANSETRON 2 MG/ML
INJECTION INTRAMUSCULAR; INTRAVENOUS AS NEEDED
Status: DISCONTINUED | OUTPATIENT
Start: 2022-05-18 | End: 2022-05-18 | Stop reason: SURG

## 2022-05-18 RX ORDER — LIDOCAINE HYDROCHLORIDE AND EPINEPHRINE 10; 10 MG/ML; UG/ML
INJECTION, SOLUTION INFILTRATION; PERINEURAL AS NEEDED
Status: DISCONTINUED | OUTPATIENT
Start: 2022-05-18 | End: 2022-05-18 | Stop reason: HOSPADM

## 2022-05-18 RX ADMIN — ROCURONIUM BROMIDE 10 MG: 10 INJECTION INTRAVENOUS at 09:13

## 2022-05-18 RX ADMIN — FENTANYL CITRATE 25 MCG: 50 INJECTION, SOLUTION INTRAMUSCULAR; INTRAVENOUS at 09:27

## 2022-05-18 RX ADMIN — ONDANSETRON 4 MG: 2 INJECTION INTRAMUSCULAR; INTRAVENOUS at 10:21

## 2022-05-18 RX ADMIN — LOSARTAN POTASSIUM 50 MG: 50 TABLET, FILM COATED ORAL at 21:04

## 2022-05-18 RX ADMIN — Medication 10 ML: at 21:05

## 2022-05-18 RX ADMIN — Medication 2 TABLET: at 13:55

## 2022-05-18 RX ADMIN — LIDOCAINE HYDROCHLORIDE 50 MG: 20 INJECTION, SOLUTION EPIDURAL; INFILTRATION; INTRACAUDAL; PERINEURAL at 10:25

## 2022-05-18 RX ADMIN — SODIUM CHLORIDE 75 ML/HR: 4.5 INJECTION, SOLUTION INTRAVENOUS at 14:26

## 2022-05-18 RX ADMIN — ACETAMINOPHEN 650 MG: 325 TABLET ORAL at 21:20

## 2022-05-18 RX ADMIN — Medication 100 MG: at 08:40

## 2022-05-18 RX ADMIN — ACETAMINOPHEN 1000 MG: 500 TABLET ORAL at 07:54

## 2022-05-18 RX ADMIN — Medication 2 TABLET: at 21:04

## 2022-05-18 RX ADMIN — KETAMINE HYDROCHLORIDE 10 MG: 50 INJECTION, SOLUTION INTRAMUSCULAR; INTRAVENOUS at 10:12

## 2022-05-18 RX ADMIN — PROPOFOL 150 MG: 10 INJECTION, EMULSION INTRAVENOUS at 08:40

## 2022-05-18 RX ADMIN — SODIUM CHLORIDE, POTASSIUM CHLORIDE, SODIUM LACTATE AND CALCIUM CHLORIDE 9 ML/HR: 600; 310; 30; 20 INJECTION, SOLUTION INTRAVENOUS at 07:54

## 2022-05-18 RX ADMIN — ATORVASTATIN CALCIUM 20 MG: 20 TABLET, FILM COATED ORAL at 21:04

## 2022-05-18 RX ADMIN — DEXAMETHASONE SODIUM PHOSPHATE 8 MG: 4 INJECTION, SOLUTION INTRA-ARTICULAR; INTRALESIONAL; INTRAMUSCULAR; INTRAVENOUS; SOFT TISSUE at 08:45

## 2022-05-18 RX ADMIN — Medication 100 MCG: at 08:53

## 2022-05-18 RX ADMIN — ONDANSETRON 4 MG: 2 INJECTION INTRAMUSCULAR; INTRAVENOUS at 11:03

## 2022-05-18 RX ADMIN — Medication 10 ML: at 15:40

## 2022-05-18 RX ADMIN — Medication 100 MCG: at 09:02

## 2022-05-18 RX ADMIN — FENTANYL CITRATE 25 MCG: 50 INJECTION, SOLUTION INTRAMUSCULAR; INTRAVENOUS at 10:40

## 2022-05-18 RX ADMIN — KETAMINE HYDROCHLORIDE 25 MG: 50 INJECTION, SOLUTION INTRAMUSCULAR; INTRAVENOUS at 09:00

## 2022-05-18 RX ADMIN — DEXMEDETOMIDINE HYDROCHLORIDE 10 MCG: 100 INJECTION, SOLUTION, CONCENTRATE INTRAVENOUS at 10:09

## 2022-05-18 RX ADMIN — EPHEDRINE SULFATE 5 MG: 50 INJECTION INTRAVENOUS at 10:17

## 2022-05-18 RX ADMIN — MIDAZOLAM HYDROCHLORIDE 2 MG: 1 INJECTION, SOLUTION INTRAMUSCULAR; INTRAVENOUS at 08:25

## 2022-05-18 RX ADMIN — PHENYLEPHRINE HYDROCHLORIDE 0.2 MCG/KG/MIN: 10 INJECTION INTRAVENOUS at 09:00

## 2022-05-18 RX ADMIN — PROPOFOL 200 MCG/KG/MIN: 10 INJECTION, EMULSION INTRAVENOUS at 08:41

## 2022-05-18 RX ADMIN — SUGAMMADEX 160 MG: 100 INJECTION, SOLUTION INTRAVENOUS at 10:26

## 2022-05-18 RX ADMIN — DEXMEDETOMIDINE HYDROCHLORIDE 20 MCG: 100 INJECTION, SOLUTION, CONCENTRATE INTRAVENOUS at 09:11

## 2022-05-18 RX ADMIN — LIDOCAINE HYDROCHLORIDE 50 MG: 20 INJECTION, SOLUTION EPIDURAL; INFILTRATION; INTRACAUDAL; PERINEURAL at 08:40

## 2022-05-18 RX ADMIN — KETAMINE HYDROCHLORIDE 15 MG: 50 INJECTION, SOLUTION INTRAMUSCULAR; INTRAVENOUS at 09:30

## 2022-05-18 RX ADMIN — GLYCOPYRROLATE 0.2 MG: 0.2 INJECTION INTRAMUSCULAR; INTRAVENOUS at 08:25

## 2022-05-18 RX ADMIN — FENTANYL CITRATE 50 MCG: 50 INJECTION, SOLUTION INTRAMUSCULAR; INTRAVENOUS at 08:40

## 2022-05-18 RX ADMIN — SODIUM CHLORIDE, POTASSIUM CHLORIDE, SODIUM LACTATE AND CALCIUM CHLORIDE 9 ML/HR: 600; 310; 30; 20 INJECTION, SOLUTION INTRAVENOUS at 12:40

## 2022-05-18 NOTE — OP NOTE
THYROIDECTOMY  Procedure Report    Patient Name:  Dk Zuniga  YOB: 1950    Date of Surgery:  5/18/2022     Indications:  Dk Zuniga is a 72 year old female with enlarging right thyroid mass with complaints of cricopharyngeal dysphagia and foreign body sensation of throat.  Patient is recommended RIGHT THYROIDECTOMY WITH ISTHMUSECTOMY AND FROZEN SECTION, RECURRENT LARYNGEAL NERVE MONITORING.  Patient understands the risks, benefits, and alternatives and wants to proceed with the planned procedures.    Pre-op Diagnosis:   Right thyroid nodule [E04.1]  Multiple thyroid nodules [E04.2]  Foreign body sensation in throat [R09.89]  Cricopharyngeal dysphagia [R13.13]  Enlargement of thyroid [E04.9]    Post-Op Diagnosis Codes:     * Right thyroid nodule [E04.1]     * Multiple thyroid nodules [E04.2]     * Foreign body sensation in throat [R09.89]     * Cricopharyngeal dysphagia [R13.13]     * Enlargement of thyroid [E04.9]     * Benign thyroid adenoma [D34]     Procedure/CPT® Codes:    1.  RIGHT THYROIDECTOMY WITH ISTHMUSECTOMY AND FROZEN SECTION  2.  RECURRENT LARYNGEAL NERVE MONITORING (1 HOUR 30 MINUTES)    Surgeon:  Mauricio Friedman MD    Staff:  Circulator: Rossy Linton RN  Scrub Person: Laura Dawson  Assistant: Daisy Holloway RN  Other: Mike Ramos RN     Assistant: Dasiy Holloway RN Assistant: Daisy Holloway RN was responsible for performing the following activities: suturing, ligating, retracting, cutting cauterizing and their skilled assistance was necessary for the success of this case.     Anesthesia: General    Estimated Blood Loss: 10 mL    Implants:    Implant Name Type Inv. Item Serial No.  Lot No. LRB No. Used Action   CLIPAPPLR M/ ENDO LIGACLIP 9 3/8IN SM - [id number] Implant CLIPAPPLR M/ ENDO LIGACLIP 9 3/8IN SM  [employer]  DIV OF J AND J [id number] Right 2 Implanted   CLIPAPPLR M/ ENDO LIGACLIP 9 3/8IN SM - [id number] Implant CLIPAPPLR M/ ENDO LIGACLIP 9  3/8IN   [employer]  DIV OF DAVINA [employer] DAVINA [id number] Right 1 Implanted   CLIPAPPLR M/ ENDO LIGACLIP 9 3/8IN  - [id number] Implant CLIPAPPLR M/ ENDO LIGACLIP 9 3/8IN   [employer]  DIV OF DAVINA [employer] DAVINA [id number] Right 2 Implanted       Specimen:          Specimens     ID Source Type Tests Collected By Collected At Frozen?    A Thyroid Tissue · TISSUE PATHOLOGY EXAM   Mauricio Friedman MD 5/18/22 0959 No    Description: right thyroid with isthmus    Comment: frozen            Findings:   1.  Right thyroid with isthmus 5cm x 4.5cm with large single nodule 3cm x 2.5cm  2.  Right recurrent laryngeal nerve identified, confirmed with nerve probe and preserved.  3.  Right superior and inferior parathyroid glands suspected to be left in the patient but not confirmed.  4.  Left thyroid without palpable nodule and small.    5.  F/S reveal benign adenomatoid nodule and therefore left thyroid left alone.  6.  Left recurrent laryngeal nerve or the parathyroid glands left alone.  7.  10 fr. Drain placed with grenade suction  8.  Postop voice normal    Complications: none    Description of Procedure:   Patient was taken to the operating room and general endotracheal anesthesia was performed in supine position using special endotracheal tube electrodes in place for continuous nerve monitoring after recurrent laryngeal nerve.  After patient been adequately anesthetized and endotracheal tube secured, patient was then placed in hyperextension with shoulder roll in place.  Neck was then prepped with Betadine and draped in the usual sterile manner.  Incision line was drawn preoperatively and it was marked again.  1% Xylocaine was 1-100,000 epinephrine 3 mL were used to infiltrate the local area of the incision.  Subsequently ground electrodes were placed and hooked up to nerve monitor for continuous nerve monitoring.    Incision was then carried out using 15 blade followed by electrocautery for hemostasis.  Subplatysmal flap was  elevated superiorly and inferiorly followed by [place] retractor placement with retraction devices in place.  Midline incision was made through the median raphae and strap muscles were dissected laterally.  Isthmus was identified and it was peeled off the pretracheal fascia and divided on the lateral aspect on the left side to include the rest of the isthmus with the right side thyroid specimen.  Dissection was performed bluntly identifying the superior pole where the vessels were ligated and divided using titanium ligaclips freeing up the superior pole vessels.  Inferior pole vessels were also ligated and divided using titanium ligaclips as well.  After both poles were released including the isthmus then the rest of the specimen was pulled through the incision after L[place] Star retractor was removed.  Lateral to medial dissection was performed and recurrent laryngeal nerve was identified and preserved with and confirmed with nerve probe.  Dissection remained on the thyroid capsule in order to prevent removal of the parathyroid gland unless it was within the thyroid gland.  And superior pole was also removed with the rest of his thyroid specimen altogether.  When it was removed and measured 5 cm x 4.5 cm with largest single nodule on the right side measuring 3 cm x 2.5 cm.  This was sent for frozen section.  In the meantime wound was copious irrigated with saline and hemostasis obtained.  Nerve was confirmed and with nerve probe.  Superior and inferior parathyroid glands were not readily visible although it was felt that it was left in the patient.  Left thyroid was bluntly dissected and palpated with no palpable suspicious nodules and it was rather small.  When the frozen section returned as a benign adenomatoid nodule, it was decided to leave the left side alone.    Serbian drain was looped around the right side and brought out through the midline incision.  An midline incision was closed with 4-0 Vicryl in running  fashion.  Drain was then pulled through the skin just adjacent to the incision on the left side and hooked up to grenade suction.  Otherwise subcutaneous closure was made in layers using 4-0 Vicryl and 5-0 Vicryl after manner.  Skin was closed with 6-0 Prolene in running fashion.  Drain was also sutured in place with 6-0 Prolene and hooked up to grenade suction.  Bacitracin was applied along the incision.  Patient was turned over to anesthesia service and subsequently patient was extubated and transported to recovery room in good condition.  Postop voice was normal.          Mauricio Friedman MD     Date: 5/18/2022  Time: 11:07 EDT

## 2022-05-18 NOTE — ANESTHESIA PREPROCEDURE EVALUATION
Anesthesia Evaluation     Patient summary reviewed and Nursing notes reviewed   history of anesthetic complications: PONV  NPO Solid Status: > 8 hours  NPO Liquid Status: > 2 hours           Airway   Mallampati: II  TM distance: >3 FB  Neck ROM: full  No difficulty expected  Dental      Pulmonary - normal exam    breath sounds clear to auscultation  (+) asthma,sleep apnea,   Cardiovascular - normal exam  Exercise tolerance: good (4-7 METS)    Rhythm: regular  Rate: normal    (+) hypertension, hyperlipidemia,       Neuro/Psych  (+) dizziness/light headedness,    GI/Hepatic/Renal/Endo    (+)  GERD,  thyroid problem thyroid nodules    Musculoskeletal     Abdominal    Substance History - negative use     OB/GYN negative ob/gyn ROS         Other   arthritis,                    Anesthesia Plan    ASA 3     general   (Patient understands anesthesia not responsible for dental damage.)  intravenous induction     Anesthetic plan, all risks, benefits, and alternatives have been provided, discussed and informed consent has been obtained with: patient.  Use of blood products discussed with patient .   Plan discussed with CRNA.        CODE STATUS:

## 2022-05-18 NOTE — ANESTHESIA POSTPROCEDURE EVALUATION
Patient: Dk Zuniga    Procedure Summary     Date: 05/18/22 Room / Location: [facility] OR 02 / [facility] MAIN OR    Anesthesia Start: 0832 Anesthesia Stop: 1050    Procedure: RIGHT THYROIDECTOMY WITH FROZEN SECTION, POSSIBLE TOTAL, RECURRENT LARYNGEAL NERVE MONITORING (Right Neck) Diagnosis:       Right thyroid nodule      Multiple thyroid nodules      Foreign body sensation in throat      Cricopharyngeal dysphagia      Enlargement of thyroid      Benign thyroid adenoma      (Right thyroid nodule [E04.1])      (Multiple thyroid nodules [E04.2])      (Foreign body sensation in throat [R09.89])      (Cricopharyngeal dysphagia [R13.13])      (Enlargement of thyroid [E04.9])    Surgeons: Mauricio Friedman MD Provider: Miguel Waldron MD    Anesthesia Type: general ASA Status: 3          Anesthesia Type: general    Vitals  Vitals Value Taken Time   BP     Temp     Pulse 86 05/18/22 1053   Resp     SpO2 100 % 05/18/22 1053   Vitals shown include unvalidated device data.        Post Anesthesia Care and Evaluation    Patient location during evaluation: bedside  Patient participation: complete - patient participated  Level of consciousness: awake and alert  Pain management: adequate  Airway patency: patent  Anesthetic complications: No anesthetic complications  PONV Status: none  Cardiovascular status: acceptable  Respiratory status: acceptable  Hydration status: acceptable    Comments: An Anesthesiologist personally participated in the most demanding procedures (including induction and emergence if applicable) in the anesthesia plan, monitored the course of anesthesia administration at frequent intervals and remained physically present and available for immediate diagnosis and treatment of emergencies.

## 2022-05-18 NOTE — H&P
PRIMARY CARE PROVIDER: Haritha Iraheta MD  REFERRING PROVIDER: Mauricio Friedman MD    CHIEF COMPLAINT:  Preoperative evaluation for surgery    Subjective   History of Present Illness:  Dk Zuniga is a  72 y.o.  female who is here for the following problems:    Right thyroid nodule    Multiple thyroid nodules    Foreign body sensation in throat    Cricopharyngeal dysphagia    Enlargement of thyroid      She is scheduled for RIGHT THYROIDECTOMY WITH FROZEN SECTION, POSSIBLE TOTAL, RECURRENT LARYNGEAL NERVE MONITORING (Right). There has been no significant change in the history since the preoperative office evaluation.     Review of Systems:  CONSTITUTIONAL: no fever or chills  PULMONARY: no cough or shortness of breath  GI: no nausea or vomiting    Past History:  Medical History: has a past medical history of Asthma, Atypical chest pain, BPPV (benign paroxysmal positional vertigo), right, Dyslipidemia, Dysphasia, Fissure in skin, GERD (gastroesophageal reflux disease), Hyperlipidemia, Hypertension, Obstructive sleep apnea, Pneumothorax, PONV (postoperative nausea and vomiting), Sjogren's syndrome (HCC), Thyroid nodule, and Vertigo.   Surgical History: has a past surgical history that includes Colonoscopy; Tonsillectomy; Hysterectomy; Cholecystectomy; Pleural scarification (Right); Breast Implant Revision (Bilateral); Esophagogastroduodenoscopy (N/A, 06/23/2021); Colonoscopy (N/A, 06/23/2021); and Appendectomy.   Family History: family history includes Colon cancer in her paternal grandfather and paternal grandmother; Heart disease in her father and mother.   Social History: reports that she quit smoking about 40 years ago. Her smoking use included cigarettes. She has never used smokeless tobacco. She reports current alcohol use of about 1.0 standard drink of alcohol per week. She reports that she does not use drugs.  Home Medications:  ALLERGY SERUM INJECTION, EPINEPHrine, Elderberry, Magnesium, Meclizine HCl,  Turmeric, albuterol sulfate HFA, atorvastatin, azelastine, coenzyme Q10, cyanocobalamin, dilTIAZem, fish oil, fluticasone, hydroCHLOROthiazide, losartan, multivitamin with minerals, ondansetron, pantoprazole, and vitamin D     Allergies: Effexor [venlafaxine], Wasp venom, Latex, Penicillins, and Tetanus toxoids     Objective     Vital Signs:  Temp:  [97.9 °F (36.6 °C)] 97.9 °F (36.6 °C)  Heart Rate:  [88] 88  Resp:  [20] 20  BP: (176)/(86) 176/86    Physical Exam:  CONSTITUTIONAL: well nourished, well-developed, alert, oriented, in no acute distress   COMMUNICATION AND VOICE: able to communicate normally, normal voice quality  HEAD: normocephalic, no lesions, atraumatic, no tenderness, no masses   FACE: appearance normal, no lesions, no tenderness, no deformities, facial motion symmetric  EYES: ocular motility normal, eyelids normal, orbits normal, no proptosis, conjunctiva normal , pupils equal, round   EARS:  Hearing: hearing to conversational voice intact bilaterally   External Ears: normal bilaterally, no lesions  NOSE:  External Nose: external nasal structure normal, no tenderness on palpation, no nasal discharge, no lesions, no evidence of trauma, nostrils patent   ORAL:  Lips: upper and lower lips without lesion   NECK:  Inspection and Palpation: neck appearance normal, no masses or tenderness  CHEST/RESPIRATORY: normal respiratory effort   CARDIOVASCULAR: no cyanosis or edema   NEUROLOGICAL/PSYCHIATRIC: oriented to time, place and person, mood normal, affect appropriate, CN II-XII intact grossly    ASSESSMENT:    Right thyroid nodule    Multiple thyroid nodules    Foreign body sensation in throat    Cricopharyngeal dysphagia    Enlargement of thyroid    PLAN:  RIGHT THYROIDECTOMY WITH FROZEN SECTION, POSSIBLE TOTAL, RECURRENT LARYNGEAL NERVE MONITORING (Right)    THYROIDECTOMY: A thyroidectomy was recommended. The risks and benefits were explained including but not limited to bleeding, infection, persistent  and/or recurrent disease, risks of the general anesthesia, pain, recurrent laryngeal nerve injury with hoarseness and airway loss, parathyroid injury and hypocalcemia. Operative possibilities including hemithyroidectomy, total thyroidectomy and michelle dissections were discussed. Possibilities for delayed need for total thyroidectomy pending pathologic diagnosis were also discussed. Alternatives were discussed. No guarantees were made or implied. Questions were asked appropriately answered.      Mauricio Friedman MD  05/18/22  07:16 EDT

## 2022-05-19 VITALS
SYSTOLIC BLOOD PRESSURE: 148 MMHG | HEART RATE: 68 BPM | BODY MASS INDEX: 31.8 KG/M2 | TEMPERATURE: 98.5 F | WEIGHT: 179.45 LBS | RESPIRATION RATE: 20 BRPM | OXYGEN SATURATION: 96 % | DIASTOLIC BLOOD PRESSURE: 54 MMHG | HEIGHT: 63 IN

## 2022-05-19 LAB
CALCIUM SPEC-SCNC: 11 MG/DL (ref 8.6–10.5)
CYTO UR: NORMAL
HOLD SPECIMEN: NORMAL
LAB AP CASE REPORT: NORMAL
LAB AP CLINICAL INFORMATION: NORMAL
Lab: NORMAL
PATH REPORT.FINAL DX SPEC: NORMAL
PATH REPORT.GROSS SPEC: NORMAL
PTH-INTACT SERPL-MCNC: 18.2 PG/ML (ref 15–65)
WHOLE BLOOD HOLD SPECIMEN: NORMAL

## 2022-05-19 PROCEDURE — 82310 ASSAY OF CALCIUM: CPT | Performed by: OTOLARYNGOLOGY

## 2022-05-19 PROCEDURE — 83970 ASSAY OF PARATHORMONE: CPT | Performed by: OTOLARYNGOLOGY

## 2022-05-19 PROCEDURE — 63710000001 HYDROCHLOROTHIAZIDE 12.5 MG CAPSULE: Performed by: OTOLARYNGOLOGY

## 2022-05-19 PROCEDURE — A9270 NON-COVERED ITEM OR SERVICE: HCPCS | Performed by: OTOLARYNGOLOGY

## 2022-05-19 PROCEDURE — G0378 HOSPITAL OBSERVATION PER HR: HCPCS

## 2022-05-19 RX ORDER — AZITHROMYCIN 250 MG/1
TABLET, FILM COATED ORAL
Qty: 6 TABLET | Refills: 0 | Status: SHIPPED | OUTPATIENT
Start: 2022-05-19 | End: 2022-05-24

## 2022-05-19 RX ADMIN — HYDROCHLOROTHIAZIDE 12.5 MG: 12.5 CAPSULE ORAL at 06:11

## 2022-05-19 RX ADMIN — SODIUM CHLORIDE 75 ML/HR: 4.5 INJECTION, SOLUTION INTRAVENOUS at 04:58

## 2022-05-19 NOTE — DISCHARGE SUMMARY
Date of Discharge:  5/19/2022    Discharge Diagnosis:   Same as below    Problem List:  Active Hospital Problems   No active problems to display.      Resolved Hospital Problems    Diagnosis Date Resolved POA   • Multiple thyroid nodules [E04.2] 05/18/2022 Unknown   • Foreign body sensation in throat [R09.89] 05/18/2022 Unknown   • Cricopharyngeal dysphagia [R13.13] 05/18/2022 Unknown   • Enlargement of thyroid [E04.9] 05/18/2022 Unknown   • Right thyroid nodule [E04.1] 05/18/2022 Unknown       Presenting Problem/History of Present Illness  Right thyroid nodule [E04.1]  Multiple thyroid nodules [E04.2]  Foreign body sensation in throat [R09.89]  Cricopharyngeal dysphagia [R13.13]  Enlargement of thyroid [E04.9]      Hospital Course  Patient is a 72 y.o. female presented with above problems and underwent below procedure uneventfully.  Patient is doing well on first postop day with Chevostek negative and taking p.o. very well with excellent voice.  Patient still having bloody drainage.  Therefore we will send patient home with instructions and follow-up with the drain.    Procedures Performed    Procedure(s):  RIGHT THYROIDECTOMY WITH FROZEN SECTION, POSSIBLE TOTAL, RECURRENT LARYNGEAL NERVE MONITORING  -------------------       Consults:   Consults     No orders found for last 30 day(s).          Pertinent Test Results: Lab results    Condition on Discharge: Stable    Vital Signs  Temp:  [96.9 °F (36.1 °C)-98.9 °F (37.2 °C)] 98.5 °F (36.9 °C)  Heart Rate:  [68-94] 68  Resp:  [16-20] 20  BP: (130-155)/(54-71) 148/54    Discharge Disposition  Home or Self Care    Discharge Medications     Discharge Medications      New Medications      Instructions Start Date   azithromycin 250 MG tablet  Commonly known as: Zithromax Z-Roni   Take 2 tablets (500 mg) on  Day 1,  followed by 1 tablet (250 mg) once daily on Days 2 through 5.      Calcium Carbonate-Vitamin D3 600-400 MG-UNIT tablet   1 tablet, Oral, 2 Times Daily          Continue These Medications      Instructions Start Date   albuterol sulfate  (90 Base) MCG/ACT inhaler  Commonly known as: PROVENTIL HFA;VENTOLIN HFA;PROAIR HFA   1 puff, Inhalation, Every 6 Hours PRN      ALLERGY SERUM INJECTION   Subcutaneous, Once, Weekly       atorvastatin 20 MG tablet  Commonly known as: LIPITOR   20 mg, Oral, Nightly      azelastine 0.1 % nasal spray  Commonly known as: ASTELIN   1-2 sprays, Nasal, 2 Times Daily      coenzyme Q10 100 MG capsule   1 capsule, Oral, Nightly      cyanocobalamin 1000 MCG tablet  Commonly known as: VITAMIN B-12   1 tablet, Oral, Daily      dilTIAZem 30 MG tablet  Commonly known as: CARDIZEM   30 mg, Oral, 3 Times Daily PRN      ELDERBERRY PO   Oral      EPINEPHrine 0.3 MG/0.3ML solution auto-injector injection  Commonly known as: EPIPEN   0.3 mL      fish oil 500 MG capsule capsule   1 capsule, Oral, Daily      fluticasone 50 MCG/ACT nasal spray  Commonly known as: FLONASE   2 sprays, Each Nare, Daily      hydroCHLOROthiazide 12.5 MG capsule  Commonly known as: MICROZIDE   12.5 mg, Oral, Every Morning      losartan 50 MG tablet  Commonly known as: COZAAR   50 mg, Oral, 2 Times Daily      Magnesium 500 MG tablet   Oral, 3 Times Weekly      MECLIZINE HCL PO   25 mg, Oral, As Needed      multivitamin with minerals tablet tablet   1 tablet, Oral, Daily      ondansetron 4 MG tablet  Commonly known as: ZOFRAN   4 mg, Oral, Every 8 Hours PRN, for nausea      pantoprazole 20 MG EC tablet  Commonly known as: PROTONIX   20 mg, Oral, Daily      QC TUMERIC COMPLEX PO   1 capsule, Oral, Daily      vitamin D 1.25 MG (58407 UT) capsule capsule  Commonly known as: ERGOCALCIFEROL   50,000 Units, Oral, Weekly             Discharge Diet   Diet Instructions     Advance Diet as Tolerated            Activity at Discharge  Activity Instructions     Discharge Activity      1) No driving while taking narcotics.   2) Return to school / work as instructed or when ready  3) May shower  after drain is removed and drain hole is sealed  4) Do not lift / push / pull more than 10 lbs.          Follow-up Appointments  Future Appointments   Date Time Provider Department [place]   4/5/2023 10:30 AM Nelson Cortes MD [facility]     Additional Instructions for the Follow-ups that You Need to Schedule     Discharge Follow-up with Specified Provider: DR. FRIEDMAN; 1 Week   As directed      To: DR. FRIEDMAN    Follow Up: 1 Week    Follow Up Details: WHEN THE DRAINAGE IS LESS THAN 5 MILLILITERS OVER 24 HOURS, CALL OFFICE FOR DRAIN REMOVAL.  OTHERWISE KEEP THE APPOINTMENT ALREADY SCHEDULED IN ONE WEEK FOR SUTURE REMOVAL         Dressing Change Instructions   As directed      Keep incision dry until drain is removed and drain hole is sealed.  If no drain, keep incision dry for 48 hours    Order Comments: Keep incision dry until drain is removed and drain hole is sealed.  If no drain, keep incision dry for 48 hours          Notify Physician or Go To The ED For the Following Conditions   As directed      Notify Dr. Leon Friedman if there is any drain malfunction or facial nerve weakness.    Order Comments: Notify Dr. Leon Friedman if there is any drain malfunction or facial nerve weakness.                Test Results Pending at Discharge  Pending Labs     Order Current Status    Extra Tubes Collected (05/19/22 0758)    Green Top (No Gel) Collected (05/19/22 0758)    Lavender Top Collected (05/19/22 0758)    Tissue Pathology Exam Preliminary result          Mauricio Friedman MD    Time: 30 minutes  Electronically signed by Mauricio Friedman MD, 05/19/22, 8:01 AM EDT.

## 2022-05-19 NOTE — PLAN OF CARE
Goal Outcome Evaluation:      VSS>. UOP adequate. Pt ambulates independently in hallway and room with no abnormalities. Pt did not c/o pain and denied pain medications from MD when discussing D/C. Pt D/C this morning. Will conitnue to monitor. Mirian Herrera RN

## 2022-05-20 ENCOUNTER — READMISSION MANAGEMENT (OUTPATIENT)
Dept: CALL CENTER | Facility: HOSPITAL | Age: 72
End: 2022-05-20

## 2022-05-20 NOTE — OUTREACH NOTE
Prep Survey    Flowsheet Row Responses   [facility] discharged from? [facility]   Is LACE score < 7 ? Yes   Emergency Room discharge w/ pulse ox? No   Eligibility Not Eligible  [Low risk for readmission]   What are the reasons patient is not eligible? Other   Does the patient have one of the following disease processes/diagnoses(primary or secondary)? Other   Prep survey completed? Yes          CIARA AGUILAR - Registered Nurse

## 2022-06-06 ENCOUNTER — LAB (OUTPATIENT)
Dept: LAB | Facility: HOSPITAL | Age: 72
End: 2022-06-06

## 2022-06-06 ENCOUNTER — TRANSCRIBE ORDERS (OUTPATIENT)
Dept: ADMINISTRATIVE | Facility: HOSPITAL | Age: 72
End: 2022-06-06

## 2022-06-06 DIAGNOSIS — E03.9 HYPOTHYROIDISM, UNSPECIFIED TYPE: ICD-10-CM

## 2022-06-06 DIAGNOSIS — E03.9 HYPOTHYROIDISM, UNSPECIFIED TYPE: Primary | ICD-10-CM

## 2022-06-06 LAB
CALCIUM SPEC-SCNC: 10.2 MG/DL (ref 8.6–10.5)
PTH-INTACT SERPL-MCNC: 19.4 PG/ML (ref 15–65)
T4 FREE SERPL-MCNC: 0.7 NG/DL (ref 0.93–1.7)
TSH SERPL DL<=0.05 MIU/L-ACNC: 7.81 UIU/ML (ref 0.27–4.2)

## 2022-06-06 PROCEDURE — 83970 ASSAY OF PARATHORMONE: CPT

## 2022-06-06 PROCEDURE — 82310 ASSAY OF CALCIUM: CPT

## 2022-06-06 PROCEDURE — 84439 ASSAY OF FREE THYROXINE: CPT

## 2022-06-06 PROCEDURE — 36415 COLL VENOUS BLD VENIPUNCTURE: CPT

## 2022-06-06 PROCEDURE — 84443 ASSAY THYROID STIM HORMONE: CPT

## 2022-07-15 ENCOUNTER — LAB (OUTPATIENT)
Dept: LAB | Facility: HOSPITAL | Age: 72
End: 2022-07-15

## 2022-07-15 ENCOUNTER — TRANSCRIBE ORDERS (OUTPATIENT)
Dept: LAB | Facility: HOSPITAL | Age: 72
End: 2022-07-15

## 2022-07-15 DIAGNOSIS — E03.9 HYPOTHYROIDISM, UNSPECIFIED TYPE: ICD-10-CM

## 2022-07-15 DIAGNOSIS — E03.9 HYPOTHYROIDISM, UNSPECIFIED TYPE: Primary | ICD-10-CM

## 2022-07-15 LAB
T4 FREE SERPL-MCNC: 0.99 NG/DL (ref 0.93–1.7)
TSH SERPL DL<=0.05 MIU/L-ACNC: 2.3 UIU/ML (ref 0.27–4.2)

## 2022-07-15 PROCEDURE — 84439 ASSAY OF FREE THYROXINE: CPT

## 2022-07-15 PROCEDURE — 36415 COLL VENOUS BLD VENIPUNCTURE: CPT

## 2022-07-15 PROCEDURE — 84443 ASSAY THYROID STIM HORMONE: CPT

## 2022-08-29 ENCOUNTER — LAB (OUTPATIENT)
Dept: LAB | Facility: HOSPITAL | Age: 72
End: 2022-08-29

## 2022-08-29 ENCOUNTER — TRANSCRIBE ORDERS (OUTPATIENT)
Dept: ADMINISTRATIVE | Facility: HOSPITAL | Age: 72
End: 2022-08-29

## 2022-08-29 DIAGNOSIS — E03.9 HYPOTHYROIDISM, UNSPECIFIED TYPE: ICD-10-CM

## 2022-08-29 DIAGNOSIS — E03.9 HYPOTHYROIDISM, UNSPECIFIED TYPE: Primary | ICD-10-CM

## 2022-08-29 LAB
T3FREE SERPL-MCNC: 2.89 PG/ML (ref 2–4.4)
T4 FREE SERPL-MCNC: 0.98 NG/DL (ref 0.93–1.7)
TSH SERPL DL<=0.05 MIU/L-ACNC: 2.36 UIU/ML (ref 0.27–4.2)

## 2022-08-29 PROCEDURE — 84443 ASSAY THYROID STIM HORMONE: CPT

## 2022-08-29 PROCEDURE — 84439 ASSAY OF FREE THYROXINE: CPT

## 2022-08-29 PROCEDURE — 84481 FREE ASSAY (FT-3): CPT

## 2022-08-29 PROCEDURE — 36415 COLL VENOUS BLD VENIPUNCTURE: CPT

## 2022-09-19 ENCOUNTER — OFFICE VISIT (OUTPATIENT)
Dept: SLEEP MEDICINE | Facility: HOSPITAL | Age: 72
End: 2022-09-19

## 2022-09-19 VITALS
OXYGEN SATURATION: 98 % | HEIGHT: 64 IN | BODY MASS INDEX: 31.12 KG/M2 | WEIGHT: 182.3 LBS | HEART RATE: 65 BPM | DIASTOLIC BLOOD PRESSURE: 62 MMHG | SYSTOLIC BLOOD PRESSURE: 153 MMHG

## 2022-09-19 DIAGNOSIS — E66.9 CLASS 1 OBESITY: ICD-10-CM

## 2022-09-19 DIAGNOSIS — Z99.89 OSA ON CPAP: Primary | ICD-10-CM

## 2022-09-19 DIAGNOSIS — G47.33 OSA ON CPAP: Primary | ICD-10-CM

## 2022-09-19 PROBLEM — E66.811 CLASS 1 OBESITY: Status: ACTIVE | Noted: 2022-09-19

## 2022-09-19 PROCEDURE — G0463 HOSPITAL OUTPT CLINIC VISIT: HCPCS | Performed by: INTERNAL MEDICINE

## 2022-09-19 PROCEDURE — 99213 OFFICE O/P EST LOW 20 MIN: CPT | Performed by: INTERNAL MEDICINE

## 2022-09-19 RX ORDER — CYCLOSPORINE 0.5 MG/ML
1 EMULSION OPHTHALMIC 2 TIMES DAILY
COMMUNITY

## 2022-09-19 RX ORDER — LEVOTHYROXINE SODIUM 0.1 MG/1
100 TABLET ORAL DAILY
COMMUNITY

## 2022-09-19 RX ORDER — AMLODIPINE BESYLATE AND BENAZEPRIL HYDROCHLORIDE 5; 10 MG/1; MG/1
0.5 CAPSULE ORAL DAILY
COMMUNITY

## 2022-09-19 NOTE — PROGRESS NOTES
"  [address] [address]  Phone: [phone]  Fax: [phone]      SLEEP CLINIC FOLLOW UP PROGRESS NOTE.    Dk Zuniga  [id number]   1950  72 y.o.  female      PCP: Haritha Iraheta MD      Date of visit: 9/19/2022    Chief Complaint   Patient presents with   • Sleep Apnea   • Obesity       HPI:  This is a 72 y.o. years old patient is here for the management of obstructive sleep apnea.  Sleep apnea is moderate in severity with a AHI of 19/hr. Patient is using positive airway pressure therapy with CPAP 13 and the symptoms of snoring, non-restorative sleep and daytime excessive sleepiness have improved significantly on the therapy. Normally goes to bed at 10 PM and wakes up at 6 8.  The patient wakes up 1-2 time(s) during the night and has no problem going back to sleep.  Feels refreshed after waking up.  Patient also denies headaches and nasal congestion.  She had a partial thyroidectomy recently and it was not cancerous and replacement medication is being adjusted    Medications and allergies are reviewed by me and documented in the encounter.     SOCIAL (habits pertaining to sleep medicine)  History tobacco use:No   History of alcohol use: 0 per week  Caffeine use: 3     REVIEW OF SYSTEMS:   [place] Sleepiness Scale :Total score: 3   Nasal congestion:Yes   Dry mouth/nose:Yes   Post nasal drip; No   Acid reflux/Heartburn:Yes   Abd bloating:No   Morning headache:No   Anxiety:No   Depression:No    PHYSICAL EXAMINATION:  CONSTITUTIONAL:  Vitals:    09/19/22 1100   BP: 153/62   Pulse: 65   SpO2: 98%   Weight: 82.7 kg (182 lb 4.8 oz)   Height: 162.6 cm (64\")    Body mass index is 31.29 kg/m².   NOSE: nasal passages are clear, No deformities noted   RESP SYSTEM: Not in any respiratory distress, no chest deformities noted,   CARDIOVASULAR: No edema noted  NEURO: Oriented x 3, gait normal,  Mood and affect appeared appropriate      Data reviewed:  The Smart " card downloaded on 9/19/2022 has been reviewed independently by me for compliance and discussed the data with the patient.   Compliance; 100%  More than 4 hr use, 100%  Average use of the device 8 hours and 6-minute per night  Residual AHI: 2.7 /hr (goal < 5.0 /hr)  Mask type: Nasal pillows  Device: ResMed  DME: [facility], [place]      ASSESSMENT AND PLAN:  · Obstructive sleep apnea ( G 47.33).  The symptoms of sleep apnea have improved with the device and the treatment.  Patient's compliance with the device is excellent for treatment of sleep apnea.  I have independently reviewed the smart card down load and discussed with the patient the download data and encouarged the patient to continue to use the device.The residual AHI is acceptable. The device is benefiting the patient and the device is medically necessary.  Without proper control of sleep apnea and good compliance there is a increased risk for hypertension, diabetes mellitus and nonrestorative sleep with hypersomnia which can increase risk for motor vehicle accidents.  Untreated sleep apnea is also a risk factor for development of atrial fibrillation, pulmonary hypertension and stroke. The patient is also instructed to get the supplies from the [employer] company and and change them on a regular basis.  A prescription for supplies has been sent to the [employer] company.  I have also discussed the good sleep hygiene habits and adequate amount of sleep needed for good health.  · Return in about 1 year (around 9/19/2023) for Next scheduled follow-up. . Patient's questions were answered.      Lex Palma MD  Sleep Medicine.  Medical Director, [facility]  9/19/2022 ,

## 2022-11-10 ENCOUNTER — TRANSCRIBE ORDERS (OUTPATIENT)
Dept: LAB | Facility: HOSPITAL | Age: 72
End: 2022-11-10

## 2022-11-10 ENCOUNTER — LAB (OUTPATIENT)
Dept: LAB | Facility: HOSPITAL | Age: 72
End: 2022-11-10

## 2022-11-10 DIAGNOSIS — E03.9 HYPOTHYROIDISM, UNSPECIFIED TYPE: ICD-10-CM

## 2022-11-10 DIAGNOSIS — E03.9 HYPOTHYROIDISM, UNSPECIFIED TYPE: Primary | ICD-10-CM

## 2022-11-10 LAB
T3FREE SERPL-MCNC: 3.35 PG/ML (ref 2–4.4)
T4 FREE SERPL-MCNC: 1.25 NG/DL (ref 0.93–1.7)
TSH SERPL DL<=0.05 MIU/L-ACNC: 0.09 UIU/ML (ref 0.27–4.2)

## 2022-11-10 PROCEDURE — 84443 ASSAY THYROID STIM HORMONE: CPT

## 2022-11-10 PROCEDURE — 36415 COLL VENOUS BLD VENIPUNCTURE: CPT

## 2022-11-10 PROCEDURE — 84481 FREE ASSAY (FT-3): CPT

## 2022-11-10 PROCEDURE — 84439 ASSAY OF FREE THYROXINE: CPT

## 2023-01-05 ENCOUNTER — TRANSCRIBE ORDERS (OUTPATIENT)
Dept: LAB | Facility: HOSPITAL | Age: 73
End: 2023-01-05
Payer: MEDICARE

## 2023-01-05 ENCOUNTER — LAB (OUTPATIENT)
Dept: LAB | Facility: HOSPITAL | Age: 73
End: 2023-01-05
Payer: MEDICARE

## 2023-01-05 DIAGNOSIS — E03.9 HYPOTHYROIDISM, UNSPECIFIED TYPE: Primary | ICD-10-CM

## 2023-01-05 DIAGNOSIS — E03.9 HYPOTHYROIDISM, UNSPECIFIED TYPE: ICD-10-CM

## 2023-01-05 PROCEDURE — 36415 COLL VENOUS BLD VENIPUNCTURE: CPT

## 2023-01-05 PROCEDURE — 84443 ASSAY THYROID STIM HORMONE: CPT

## 2023-01-05 PROCEDURE — 84481 FREE ASSAY (FT-3): CPT

## 2023-01-05 PROCEDURE — 84439 ASSAY OF FREE THYROXINE: CPT

## 2023-01-06 LAB
T3FREE SERPL-MCNC: 2.81 PG/ML (ref 2–4.4)
T4 FREE SERPL-MCNC: 1 NG/DL (ref 0.93–1.7)
TSH SERPL DL<=0.05 MIU/L-ACNC: 1.09 UIU/ML (ref 0.27–4.2)

## 2023-02-15 ENCOUNTER — TRANSCRIBE ORDERS (OUTPATIENT)
Dept: LAB | Facility: HOSPITAL | Age: 73
End: 2023-02-15
Payer: MEDICARE

## 2023-02-15 ENCOUNTER — LAB (OUTPATIENT)
Dept: LAB | Facility: HOSPITAL | Age: 73
End: 2023-02-15
Payer: MEDICARE

## 2023-02-15 DIAGNOSIS — E03.9 HYPOTHYROIDISM, ADULT: Primary | ICD-10-CM

## 2023-02-15 DIAGNOSIS — E03.9 HYPOTHYROIDISM, ADULT: ICD-10-CM

## 2023-02-15 LAB
T3FREE SERPL-MCNC: 2.6 PG/ML (ref 2–4.4)
T4 FREE SERPL-MCNC: 1.18 NG/DL (ref 0.93–1.7)
TSH SERPL DL<=0.05 MIU/L-ACNC: 0.76 UIU/ML (ref 0.27–4.2)

## 2023-02-15 PROCEDURE — 84443 ASSAY THYROID STIM HORMONE: CPT

## 2023-02-15 PROCEDURE — 84481 FREE ASSAY (FT-3): CPT

## 2023-02-15 PROCEDURE — 84439 ASSAY OF FREE THYROXINE: CPT

## 2023-02-15 PROCEDURE — 36415 COLL VENOUS BLD VENIPUNCTURE: CPT

## 2023-04-20 ENCOUNTER — OFFICE VISIT (OUTPATIENT)
Dept: PULMONOLOGY | Facility: CLINIC | Age: 73
End: 2023-04-20
Payer: MEDICARE

## 2023-04-20 VITALS
DIASTOLIC BLOOD PRESSURE: 91 MMHG | BODY MASS INDEX: 31.31 KG/M2 | HEIGHT: 64 IN | HEART RATE: 78 BPM | TEMPERATURE: 98 F | RESPIRATION RATE: 16 BRPM | OXYGEN SATURATION: 99 % | SYSTOLIC BLOOD PRESSURE: 163 MMHG | WEIGHT: 183.4 LBS

## 2023-04-20 DIAGNOSIS — G47.33 OSA ON CPAP: ICD-10-CM

## 2023-04-20 DIAGNOSIS — Z99.89 OSA ON CPAP: ICD-10-CM

## 2023-04-20 DIAGNOSIS — M35.00 SJOGREN'S SYNDROME, WITH UNSPECIFIED ORGAN INVOLVEMENT: ICD-10-CM

## 2023-04-20 DIAGNOSIS — R76.8 ELEVATED ANTINUCLEAR ANTIBODY (ANA) LEVEL: ICD-10-CM

## 2023-04-20 DIAGNOSIS — J93.83 SPONTANEOUS PNEUMOTHORAX: ICD-10-CM

## 2023-04-20 DIAGNOSIS — K21.9 GASTROESOPHAGEAL REFLUX DISEASE, UNSPECIFIED WHETHER ESOPHAGITIS PRESENT: Primary | ICD-10-CM

## 2023-04-20 DIAGNOSIS — J98.4 LUNG DISEASE: ICD-10-CM

## 2023-04-20 RX ORDER — FLUORIDE TOOTHPASTE
15 TOOTHPASTE DENTAL AS NEEDED
Qty: 150 ML | Refills: 1 | Status: SHIPPED | OUTPATIENT
Start: 2023-04-20

## 2023-04-20 RX ORDER — BUSPIRONE HYDROCHLORIDE 10 MG/1
TABLET ORAL
COMMUNITY
Start: 2023-04-17

## 2023-04-20 RX ORDER — LEVOTHYROXINE SODIUM 88 UG/1
1 TABLET ORAL DAILY
COMMUNITY
Start: 2023-03-10

## 2023-04-20 NOTE — PROGRESS NOTES
Primary Care Provider  Haritha Iraheta MD     Referring Provider  No ref. provider found     Chief Complaint  Sleep Apnea, Sjogren's disease, and Follow-up (1 Year follow up/Bronchiectasis)    Subjective          Dk Zuniga presents to [facility]MONARY [facility]ARE MEDICINE  Sleep Apnea  Pertinent negatives include no abdominal pain, chest pain, chills, congestion (Nasal), coughing, fatigue, fever, joint swelling, myalgias, nausea, numbness, sore throat, vomiting or weakness.     Dk Zuniga is a 73 y.o. female patient with history of Sjogren's disease, dyspnea on exertion, bronchiectasis, thyroid nodule, seasonal allergies, and tobacco abuse of cigarettes in remission.  She is here for follow-up.  Since her last office visit, she has been on albuterol as needed and rarely using it.  She had COVID-19 infection in September 2022, was treated with fax reviewed.  Prior to that, she had Calsalettes of her COVID-19 vaccines.  She is back on allergy shots, getting it every other week.  She continues to use CPAP with nasal pillows 1.  She received a new machine 1 year ago.  She is seeing Dr. Doyle in ophthalmology for dry eyes with Sjogren's syndrome.  She also has dry mouth at times.  She has intermittent cough, does not produce much phlegm.  Her bronchiectasis has been stable.  I reviewed the last CT scan of the chest from a year ago which showed stable cystic bronchiectasis.  She has  who is demented now and is having to take care of him.  She is currently taking Protonix for GERD.  Due to patient's bronchiectasis, she is due for a repeat CT scan this month.  Overall, patient is doing very well and has no other concerns at this time.  She is up-to-date with her flu, pneumonia, and Covid vaccine.  She is able to perform her ADLs without difficulty.     Her history of smoking is   Tobacco Use: Medium Risk   • Smoking Tobacco Use: Former   • Smokeless Tobacco Use: Never   • Passive  Exposure: Past   .    Review of Systems   Constitutional: Negative for chills, fatigue, fever, unexpected weight gain and unexpected weight loss.   HENT: Negative for congestion (Nasal), hearing loss, mouth sores, nosebleeds, postnasal drip, sore throat and trouble swallowing.    Eyes: Negative for blurred vision and visual disturbance.   Respiratory: Positive for shortness of breath. Negative for apnea, cough and wheezing.         Negative for Hemoptysis     Cardiovascular: Negative for chest pain, palpitations and leg swelling.   Gastrointestinal: Negative for abdominal pain, constipation, diarrhea, nausea, vomiting and GERD.        Negative for Jaundice  Negative for Bloating  Negative for Melena   Musculoskeletal: Negative for joint swelling and myalgias.        Negative for Joint pain  Negative for Joint stiffness   Skin: Negative for color change.        Negative for cyanosis   Neurological: Negative for syncope, weakness, numbness and headache.      Sleep: Negative for Excessive daytime sleepiness  Negative for morning headaches  Negative for Snoring    Family History   Problem Relation Age of Onset   • Heart disease Mother    • Heart disease Father         qaud bi pass   • Colon cancer Paternal Grandmother    • Colon cancer Paternal Grandfather    • Malig Hyperthermia Neg Hx         Social History     Socioeconomic History   • Marital status:    Tobacco Use   • Smoking status: Former     Packs/day: 1.00     Years: 18.00     Pack years: 18.00     Types: Cigarettes     Quit date: 1981     Years since quittin.8     Passive exposure: Past   • Smokeless tobacco: Never   Vaping Use   • Vaping Use: Never used   Substance and Sexual Activity   • Alcohol use: Yes     Alcohol/week: 1.0 standard drink     Types: 1 Shots of liquor per week     Comment: daily-STOPPED WHEN COVID STARTED    • Drug use: Never   • Sexual activity: Defer        Past Medical History:   Diagnosis Date   • Asthma    • Atypical  "chest pain     STRESS TEST 6/21/2021 CLEARED BY DR. PANG 6/22/21 PER KATI    • BPPV (benign paroxysmal positional vertigo), right    • Dyslipidemia    • Dysphasia    • Fissure in skin     BREAST - HAD IMPLANTS REMOVED   • GERD (gastroesophageal reflux disease)    • Hyperlipidemia    • Hypertension    • Obstructive sleep apnea     Compliant with CPAP   • Pneumothorax     X3 \"AS A YOUNG GIRL\"   • PONV (postoperative nausea and vomiting)    • Sjogren's syndrome    • Thyroid nodule    • Vertigo         Immunization History   Administered Date(s) Administered   • COVID-19 (MODERNA) 1st, 2nd, 3rd Dose Only 02/26/2021, 04/05/2021, 10/25/2021, 04/26/2022   • COVID-19 (MODERNA) BIVALENT BOOSTER 12+YRS 10/05/2022   • COVID-19 (MODERNA) BOOSTER 02/26/2021, 04/05/2021   • Fluad Quad 65+ 09/14/2021   • Fluzone High Dose =>65 Years ([employer] ONLY) 10/18/2022   • Hepatitis A 03/06/2019   • Influenza LAIV (Nasal) 09/30/2019, 09/23/2020   • Pneumococcal, Unspecified 01/01/2021   • Shingrix 08/02/2022         Allergies   Allergen Reactions   • Effexor [Venlafaxine] Anaphylaxis   • Wasp Venom Anaphylaxis   • Latex Hives and Rash   • Penicillins Rash   • Tetanus Toxoids Hives and Swelling          Current Outpatient Medications:   •  albuterol sulfate  (90 Base) MCG/ACT inhaler, Inhale 1 puff Every 6 (Six) Hours As Needed., Disp: , Rfl:   •  ALLERGY SERUM INJECTION, Inject  under the skin into the appropriate area as directed 1 (One) Time. Weekly, Disp: , Rfl:   •  atorvastatin (LIPITOR) 20 MG tablet, Take 1 tablet by mouth Every Night., Disp: , Rfl:   •  azelastine (ASTELIN) 0.1 % nasal spray, 1-2 sprays into the nostril(s) as directed by provider 2 (Two) Times a Day., Disp: , Rfl:   •  Calcium Carbonate-Vitamin D3 600-400 MG-UNIT tablet, Take 1 tablet by mouth 2 (Two) Times a Day., Disp: 60 tablet, Rfl: 2  •  coenzyme Q10 100 MG capsule, Take 1 capsule by mouth Every Night., Disp: , Rfl:   •  cyanocobalamin (VITAMIN B-12) " 1000 MCG tablet, Take 1 tablet by mouth Daily., Disp: , Rfl:   •  cycloSPORINE (RESTASIS) 0.05 % ophthalmic emulsion, 1 drop 2 (Two) Times a Day., Disp: , Rfl:   •  diltiaZEM (CARDIZEM) 30 MG tablet, Take 1 tablet by mouth 3 (Three) Times a Day As Needed (ESOPHAGEAL SPASM)., Disp: , Rfl:   •  ELDERBERRY PO, Take  by mouth., Disp: , Rfl:   •  EPINEPHrine (EPIPEN) 0.3 MG/0.3ML solution auto-injector injection, 0.3 mL., Disp: , Rfl:   •  fluticasone (FLONASE) 50 MCG/ACT nasal spray, 2 sprays by Each Nare route Daily., Disp: , Rfl:   •  hydrochlorothiazide (MICROZIDE) 12.5 MG capsule, Take 1 capsule by mouth Every Morning., Disp: , Rfl:   •  levothyroxine (SYNTHROID, LEVOTHROID) 88 MCG tablet, Take 1 tablet by mouth Daily., Disp: , Rfl:   •  losartan (COZAAR) 50 MG tablet, Take 1 tablet by mouth 2 (Two) Times a Day., Disp: , Rfl:   •  Magnesium 500 MG tablet, Take  by mouth 3 (Three) Times a Week., Disp: , Rfl:   •  multivitamin with minerals tablet tablet, Take 1 tablet by mouth Daily., Disp: , Rfl:   •  Omega-3 Fatty Acids (fish oil) 500 MG capsule capsule, Take 1 capsule by mouth Daily., Disp: , Rfl:   •  pantoprazole (PROTONIX) 20 MG EC tablet, Take 2 tablets by mouth Daily., Disp: , Rfl:   •  Turmeric (QC TUMERIC COMPLEX PO), Take 1 capsule by mouth Daily., Disp: , Rfl:   •  vitamin D (ERGOCALCIFEROL) 1.25 MG (32501 UT) capsule capsule, Take 1 capsule by mouth 1 (One) Time Per Week., Disp: , Rfl:   •  amLODIPine-benazepril (LOTREL 5-10) 5-10 MG per capsule, Take 0.5 capsules by mouth Daily. (Patient not taking: Reported on 4/20/2023), Disp: , Rfl:   •  busPIRone (BUSPAR) 10 MG tablet, , Disp: , Rfl:   •  levothyroxine (SYNTHROID, LEVOTHROID) 100 MCG tablet, Take 100 mcg by mouth Daily. (Patient not taking: Reported on 4/20/2023), Disp: , Rfl:   •  MECLIZINE HCL PO, Take 25 mg by mouth As Needed. (Patient not taking: Reported on 4/20/2023), Disp: , Rfl:   •  Mouthwashes (Biotene dry mouth) liquid liquid, Take 15 mL  "by mouth As Needed for Dry Mouth., Disp: 150 mL, Rfl: 1  •  ondansetron (ZOFRAN) 4 MG tablet, Take 4 mg by mouth Every 8 (Eight) Hours As Needed. for nausea (Patient not taking: Reported on 4/20/2023), Disp: , Rfl:      Objective   Physical Exam  Constitutional:       General: She is not in acute distress.     Appearance: Normal appearance. She is overweight.   HENT:      Right Ear: Hearing normal.      Left Ear: Hearing normal.      Nose: No nasal tenderness or congestion.      Mouth/Throat:      Mouth: Mucous membranes are moist. No oral lesions.   Eyes:      Extraocular Movements: Extraocular movements intact.      Pupils: Pupils are equal, round, and reactive to light.   Neck:      Thyroid: No thyroid mass or thyromegaly.   Cardiovascular:      Rate and Rhythm: Normal rate and regular rhythm.      Pulses: Normal pulses.      Heart sounds: Normal heart sounds. No murmur heard.  Pulmonary:      Effort: Pulmonary effort is normal.      Breath sounds: Normal breath sounds. No wheezing, rhonchi or rales.   Abdominal:      General: Bowel sounds are normal. There is no distension.      Palpations: Abdomen is soft.      Tenderness: There is no abdominal tenderness.   Musculoskeletal:      Cervical back: Neck supple.      Right lower leg: No edema.      Left lower leg: No edema.   Lymphadenopathy:      Cervical: No cervical adenopathy.      Upper Body:      Right upper body: No axillary adenopathy.   Skin:     General: Skin is warm and dry.      Findings: No lesion or rash.   Neurological:      General: No focal deficit present.      Mental Status: She is alert and oriented to person, place, and time.   Psychiatric:         Mood and Affect: Affect normal. Mood is not anxious or depressed.         Vital Signs:   /91 (BP Location: Left arm, Patient Position: Sitting, Cuff Size: Adult)   Pulse 78   Temp 98 °F (36.7 °C) (Tympanic)   Resp 16   Ht 162.6 cm (64\")   Wt 83.2 kg (183 lb 6.4 oz)   SpO2 99% Comment: room " air  BMI 31.48 kg/m²        Result Review :     CMP        5/18/2022    06:24 5/19/2022    06:07 6/6/2022    10:40   CMP   Calcium 10.1   11.0   10.2       CBC w/diff    CBC w/Diff 7/2/21   WBC 14.13 (A)   RBC 4.88   Hemoglobin 15.1   Hematocrit 43.1   MCV 88.3   MCH 30.9   MCHC 35.0   RDW 13.0   Platelets 248   Neutrophil Rel % 78.8 (A)   Immature Granulocyte Rel % 0.4   Lymphocyte Rel % 13.0 (A)   Monocyte Rel % 6.4   Eosinophil Rel % 1.0   Basophil Rel % 0.4   (A) Abnormal value            Data reviewed: Radiologic studies Chest CT 4/20/2021, chest x-ray 7/2/2021 and PHIL Gandhi last office note   Procedures        Assessment and Plan    Diagnoses and all orders for this visit:    1. Gastroesophageal reflux disease, unspecified whether esophagitis present (Primary)    2. Elevated antinuclear antibody (JUICE) level    3. Sjogren's syndrome, with unspecified organ involvement  -     Mouthwashes (Biotene dry mouth) liquid liquid; Take 15 mL by mouth As Needed for Dry Mouth.  Dispense: 150 mL; Refill: 1    4. Lung disease    5. Spontaneous pneumothorax    6. PATRICIA on CPAP      Bronchiectasis is stable on last imaging from April 2022.  We will consider CT scan of the chest next year after follow-up visit.  Currently seeing Dr. Doyle with ophthalmology for dry eyes.    Start Biotene for artificial saliva.  Continue albuterol as needed.  Continue allergy shots per allergist.  Continue Flonase and azelastine nasal sprays.  Encouraged patient to try to stay as active as possible.  Recommended 30 minutes of daily exercise.  Follow-up with sleep medicine as scheduled for management of obstructive sleep apnea and CPAP.  Up-to-date on all vaccinations.      Follow Up   Return in about 1 year (around 4/20/2024).  Patient was given instructions and counseling regarding her condition or for health maintenance advice. Please see specific information pulled into the AVS if appropriate.

## 2023-08-02 ENCOUNTER — LAB (OUTPATIENT)
Dept: LAB | Facility: HOSPITAL | Age: 73
End: 2023-08-02
Payer: MEDICARE

## 2023-08-02 ENCOUNTER — TRANSCRIBE ORDERS (OUTPATIENT)
Dept: LAB | Facility: HOSPITAL | Age: 73
End: 2023-08-02
Payer: MEDICARE

## 2023-08-02 DIAGNOSIS — E03.9 HYPOTHYROIDISM, UNSPECIFIED TYPE: Primary | ICD-10-CM

## 2023-08-02 DIAGNOSIS — E03.9 HYPOTHYROIDISM, UNSPECIFIED TYPE: ICD-10-CM

## 2023-08-02 LAB
T4 FREE SERPL-MCNC: 1.38 NG/DL (ref 0.93–1.7)
TSH SERPL DL<=0.05 MIU/L-ACNC: 1.04 UIU/ML (ref 0.27–4.2)

## 2023-08-02 PROCEDURE — 84443 ASSAY THYROID STIM HORMONE: CPT

## 2023-08-02 PROCEDURE — 36415 COLL VENOUS BLD VENIPUNCTURE: CPT

## 2023-08-02 PROCEDURE — 84439 ASSAY OF FREE THYROXINE: CPT

## 2023-09-27 ENCOUNTER — OFFICE VISIT (OUTPATIENT)
Dept: SLEEP MEDICINE | Facility: HOSPITAL | Age: 73
End: 2023-09-27
Payer: MEDICARE

## 2023-09-27 VITALS
HEIGHT: 64 IN | HEART RATE: 72 BPM | WEIGHT: 181.3 LBS | BODY MASS INDEX: 30.95 KG/M2 | SYSTOLIC BLOOD PRESSURE: 131 MMHG | OXYGEN SATURATION: 99 % | DIASTOLIC BLOOD PRESSURE: 74 MMHG

## 2023-09-27 DIAGNOSIS — E66.9 CLASS 1 OBESITY: ICD-10-CM

## 2023-09-27 DIAGNOSIS — G47.33 OSA ON CPAP: Primary | ICD-10-CM

## 2023-09-27 PROBLEM — E66.811 CLASS 1 OBESITY: Status: RESOLVED | Noted: 2022-09-19 | Resolved: 2023-09-27

## 2023-09-27 PROBLEM — E66.811 CLASS 1 OBESITY: Status: ACTIVE | Noted: 2023-09-27

## 2023-09-27 PROCEDURE — G0463 HOSPITAL OUTPT CLINIC VISIT: HCPCS

## 2023-09-27 RX ORDER — OLMESARTAN MEDOXOMIL 20 MG/1
1 TABLET ORAL EVERY 12 HOURS SCHEDULED
COMMUNITY
Start: 2023-08-26

## 2023-09-27 RX ORDER — BACLOFEN 10 MG/1
TABLET ORAL
COMMUNITY
Start: 2023-07-11

## 2023-09-27 RX ORDER — ASPIRIN 81 MG/1
TABLET ORAL EVERY 24 HOURS
COMMUNITY

## 2023-09-27 RX ORDER — NITROGLYCERIN 0.4 MG/1
TABLET SUBLINGUAL
COMMUNITY

## 2023-09-27 RX ORDER — TRIAMCINOLONE ACETONIDE 1 MG/G
CREAM TOPICAL
COMMUNITY
Start: 2023-06-13

## 2023-09-27 NOTE — PROGRESS NOTES
"  [address] [address]  Phone: [phone]  Fax: [phone]      SLEEP CLINIC FOLLOW UP PROGRESS NOTE.    Dk Zuniga  [id number]   1950  73 y.o.  female      PCP: Haritha Iraheta MD      Date of visit: 9/27/2023    Chief Complaint   Patient presents with    Sleep Apnea    Obesity       HPI:  This is a 73 y.o. years old patient is here for the management of obstructive sleep apnea.  Sleep apnea is moderate in severity with a AHI of 19/hr. Patient is using positive airway pressure therapy with CPAP 13 and the symptoms of sleep apnea have improved significantly on the therapy. Normally patient goes to bed at 10 PM and wakes up at 6 AM .  The patient wakes up 1 time(s) during the night and has no problem going back to sleep.  Feels refreshed after waking up.     Medications and allergies are reviewed by me and documented in the encounter.     SOCIAL (habits pertaining to sleep medicine)  History tobacco use:No   History of alcohol use: 3 per week  Caffeine use: 2     REVIEW OF SYSTEMS:   Pertaining positive symptoms are:  [place] Sleepiness Scale :Total score: 3   Nasal congestion      PHYSICAL EXAMINATION:  CONSTITUTIONAL:  Vitals:    09/27/23 1100   BP: 131/74   Pulse: 72   SpO2: 99%   Weight: 82.2 kg (181 lb 4.8 oz)   Height: 162.6 cm (64.02\")    Body mass index is 31.1 kg/m².   NOSE: nasal passages are clear, No deformities noted   RESP SYSTEM: Not in any respiratory distress, no chest deformities noted,   CARDIOVASULAR: No edema noted  NEURO: Oriented x 3, gait normal,  Mood and affect appeared appropriate      Data reviewed:  The Smart card downloaded on 9/27/2023 has been reviewed independently by me for compliance and discussed the data with the patient.   Compliance; 100%  More than 4 hr use, 100%  Average use of the device 7 hours and 56-minute per night  Residual AHI: 2.8 /hr (goal < 5.0 /hr)  Mask type: Nasal pillows  Device: ResMed  DME: " Patient aids in [place]      ASSESSMENT AND PLAN:  Obstructive sleep apnea ( G 47.33).  The symptoms of sleep apnea have improved with the device and the treatment.  Patient's compliance with the device is excellent for treatment of sleep apnea.  I have independently reviewed the smart card down load and discussed with the patient the download data and encouarged the patient to continue to use the device.The residual AHI is acceptable. The device is benefiting the patient and the device is medically necessary.  Without proper control of sleep apnea and good compliance there is a increased risk for hypertension, diabetes mellitus and nonrestorative sleep with hypersomnia which can increase risk for motor vehicle accidents.  Untreated sleep apnea is also a risk factor for development of atrial fibrillation, pulmonary hypertension, insulin resistance and stroke. The patient is also instructed to get the supplies from the [employer] and and change them on a regular basis.  A prescription for supplies has been sent to the [employer] company.  I have also discussed the good sleep hygiene habits and adequate amount of sleep needed for good health.  Obesity  1 with BMI is Body mass index is 31.1 kg/m².. I have discuss the relationship between the weight and sleep apnea. The benefit of weight loss in reducing severity of sleep apnea was discussed. Discussed diet and exercise with the patient to achieve ideal BMI.   Return in about 1 year (around 9/27/2024) for with smart card down load. . Patient's questions were answered.    9/27/2023  Lex Palma MD  Sleep Medicine.  Medical Director,   [facility] sleep centers.

## 2023-12-04 ENCOUNTER — TRANSCRIBE ORDERS (OUTPATIENT)
Dept: ADMINISTRATIVE | Facility: HOSPITAL | Age: 73
End: 2023-12-04
Payer: MEDICARE

## 2023-12-04 DIAGNOSIS — R06.00 DYSPNEA, UNSPECIFIED TYPE: Primary | ICD-10-CM

## 2023-12-28 ENCOUNTER — HOSPITAL ENCOUNTER (OUTPATIENT)
Dept: RESPIRATORY THERAPY | Facility: HOSPITAL | Age: 73
End: 2023-12-28
Payer: MEDICARE

## 2023-12-28 ENCOUNTER — HOSPITAL ENCOUNTER (OUTPATIENT)
Dept: CARDIOLOGY | Facility: HOSPITAL | Age: 73
Discharge: HOME | End: 2023-12-28
Payer: MEDICARE

## 2023-12-28 ENCOUNTER — HOSPITAL ENCOUNTER (OUTPATIENT)
Dept: RESPIRATORY THERAPY | Facility: HOSPITAL | Age: 73
Discharge: HOME | End: 2023-12-28
Payer: MEDICARE

## 2023-12-28 DIAGNOSIS — R06.00 DYSPNEA, UNSPECIFIED TYPE: ICD-10-CM

## 2023-12-28 LAB
BH CV ECHO MEAS - AO MAX PG: 8 MMHG
BH CV ECHO MEAS - AO MEAN PG: 5 MMHG
BH CV ECHO MEAS - AO ROOT DIAM: 2.8 CM
BH CV ECHO MEAS - AO V2 MAX: 146 CM/SEC
BH CV ECHO MEAS - AO V2 VTI: 29.1 CM
BH CV ECHO MEAS - AVA(I,D): 2.38 CM2
BH CV ECHO MEAS - EDV(MOD-SP2): 56.5 ML
BH CV ECHO MEAS - EDV(MOD-SP4): 68.5 ML
BH CV ECHO MEAS - EF(MOD-BP): 65.5 %
BH CV ECHO MEAS - EF(MOD-SP2): 64.4 %
BH CV ECHO MEAS - EF(MOD-SP4): 65.7 %
BH CV ECHO MEAS - ESV(MOD-SP2): 20.1 ML
BH CV ECHO MEAS - ESV(MOD-SP4): 23.5 ML
BH CV ECHO MEAS - LA DIMENSION: 2.7 CM
BH CV ECHO MEAS - LAT PEAK E' VEL: 14.2 CM/SEC
BH CV ECHO MEAS - LV MAX PG: 6.8 MMHG
BH CV ECHO MEAS - LV MEAN PG: 4 MMHG
BH CV ECHO MEAS - LV V1 MAX: 130 CM/SEC
BH CV ECHO MEAS - LV V1 VTI: 24.4 CM
BH CV ECHO MEAS - LVOT AREA: 2.8 CM2
BH CV ECHO MEAS - LVOT DIAM: 1.9 CM
BH CV ECHO MEAS - MED PEAK E' VEL: 10 CM/SEC
BH CV ECHO MEAS - MV A MAX VEL: 119 CM/SEC
BH CV ECHO MEAS - MV DEC SLOPE: 211 CM/SEC2
BH CV ECHO MEAS - MV DEC TIME: 0.23 SEC
BH CV ECHO MEAS - MV E MAX VEL: 84.4 CM/SEC
BH CV ECHO MEAS - MV E/A: 0.71
BH CV ECHO MEAS - MV P1/2T: 113.5 MSEC
BH CV ECHO MEAS - MVA(P1/2T): 1.94 CM2
BH CV ECHO MEAS - SV(LVOT): 69.2 ML
BH CV ECHO MEAS - SV(MOD-SP2): 36.4 ML
BH CV ECHO MEAS - SV(MOD-SP4): 45 ML
BH CV ECHO MEASUREMENTS AVERAGE E/E' RATIO: 6.98
IVRT: 58 MS
LEFT ATRIUM VOLUME INDEX: 12.6 ML/M2
LEFT ATRIUM VOLUME: 24 ML

## 2023-12-28 PROCEDURE — 94729 DIFFUSING CAPACITY: CPT

## 2023-12-28 PROCEDURE — 94060 EVALUATION OF WHEEZING: CPT

## 2023-12-28 PROCEDURE — 93306 TTE W/DOPPLER COMPLETE: CPT

## 2023-12-28 PROCEDURE — 94726 PLETHYSMOGRAPHY LUNG VOLUMES: CPT

## 2023-12-28 RX ORDER — ALBUTEROL SULFATE 2.5 MG/3ML
2.5 SOLUTION RESPIRATORY (INHALATION) ONCE
Status: COMPLETED | OUTPATIENT
Start: 2023-12-28 | End: 2023-12-28

## 2023-12-28 RX ADMIN — ALBUTEROL SULFATE 2.5 MG: 2.5 SOLUTION RESPIRATORY (INHALATION) at 15:57

## 2024-04-08 ENCOUNTER — PREP FOR SURGERY (OUTPATIENT)
Dept: OTHER | Facility: HOSPITAL | Age: 74
End: 2024-04-08
Payer: MEDICARE

## 2024-04-08 ENCOUNTER — CLINICAL SUPPORT (OUTPATIENT)
Dept: GASTROENTEROLOGY | Facility: CLINIC | Age: 74
End: 2024-04-08
Payer: MEDICARE

## 2024-04-08 DIAGNOSIS — K21.9 GASTROESOPHAGEAL REFLUX DISEASE, UNSPECIFIED WHETHER ESOPHAGITIS PRESENT: Primary | ICD-10-CM

## 2024-04-08 NOTE — PROGRESS NOTES
"Dk Zuniga  1950  74 y.o.    Reason for call: Recall  Prep prescribed: N/A  Prep instructions reviewed with patient and sent to patient via regular mail to the home address on file  Is the patient currently on any injectable medications for weight loss or diabetes? No  Clearance needed? Yes  If yes, what clearance is needed? Cardiology AND PULMONARY  Clearance has been requested from na  The patient has been scheduled for: EGD  After your procedure, you will be contacted with results. Please confirm the best phone # to reach the patient: [phone]  Family history of colon cancer? No  If yes, indicate relative: na  Family History   Problem Relation Age of Onset    Heart disease Mother     Heart disease Father         qaud bi pass    Colon cancer Paternal Grandmother     Colon cancer Paternal Grandfather     Malig Hyperthermia Neg Hx      Past Medical History:   Diagnosis Date    Asthma     Atypical chest pain     STRESS TEST 6/21/2021 CLEARED BY DR. PANG 6/22/21 PER KATI     BPPV (benign paroxysmal positional vertigo), right     Dyslipidemia     Dysphasia     Fissure in skin     BREAST - HAD IMPLANTS REMOVED    GERD (gastroesophageal reflux disease)     Hyperlipidemia     Hypertension     Obstructive sleep apnea     Compliant with CPAP    Pneumothorax     X3 \"AS A YOUNG GIRL\"    PONV (postoperative nausea and vomiting)     Sjogren's syndrome     Thyroid nodule     Vertigo      Allergies   Allergen Reactions    Effexor [Venlafaxine] Anaphylaxis    Wasp Venom Anaphylaxis    Latex Hives and Rash    Penicillins Rash    Tetanus Toxoids Hives and Swelling     Past Surgical History:   Procedure Laterality Date    APPENDECTOMY      WITH HYST    BREAST IMPLANT SURGERY Bilateral     REMOVED     CHOLECYSTECTOMY      LAP    COLONOSCOPY      COLONOSCOPY N/A 06/23/2021    Procedure: COLONOSCOPY;  Surgeon: Marcio Kang MD;  Location: [facility] ENDOSCOPY;  Service: Gastroenterology;  Laterality: N/A;  NORMAL COLON "    ENDOSCOPY N/A 2021    Procedure: ESOPHAGOGASTRODUODENOSCOPY WITH BIOPSIES;  Surgeon: Marcio Kang MD;  Location: [facility] ENDOSCOPY;  Service: Gastroenterology;  Laterality: N/A;  REFLUX ESOPHAGITIS    HYSTERECTOMY      PLEURAL SCARIFICATION Right     THYROIDECTOMY Right 2022    Procedure: RIGHT THYROIDECTOMY WITH FROZEN SECTION, POSSIBLE TOTAL, RECURRENT LARYNGEAL NERVE MONITORING;  Surgeon: Mauricio Friedman MD;  Location: [facility] MAIN OR;  Service: ENT;  Laterality: Right;    TONSILLECTOMY       Social History     Socioeconomic History    Marital status:    Tobacco Use    Smoking status: Former     Current packs/day: 0.00     Average packs/day: 1 pack/day for 18.0 years (18.0 ttl pk-yrs)     Types: Cigarettes     Start date: 1963     Quit date: 1981     Years since quittin.8     Passive exposure: Past    Smokeless tobacco: Never   Vaping Use    Vaping status: Never Used   Substance and Sexual Activity    Alcohol use: Yes     Alcohol/week: 1.0 standard drink of alcohol     Types: 1 Shots of liquor per week     Comment: daily-STOPPED WHEN COVID STARTED     Drug use: Never    Sexual activity: Defer       Current Outpatient Medications:     albuterol sulfate  (90 Base) MCG/ACT inhaler, Inhale 1 puff Every 6 (Six) Hours As Needed., Disp: , Rfl:     ALLERGY SERUM INJECTION, Inject  under the skin into the appropriate area as directed 1 (One) Time. Weekly, Disp: , Rfl:     amLODIPine-benazepril (LOTREL 5-10) 5-10 MG per capsule, Take 0.5 capsules by mouth Daily., Disp: , Rfl:     aspirin 81 MG EC tablet, Daily., Disp: , Rfl:     atorvastatin (LIPITOR) 20 MG tablet, Take 1 tablet by mouth Every Night., Disp: , Rfl:     azelastine (ASTELIN) 0.1 % nasal spray, 1-2 sprays into the nostril(s) as directed by provider 2 (Two) Times a Day., Disp: , Rfl:     busPIRone (BUSPAR) 10 MG tablet, , Disp: , Rfl:     Calcium Carbonate-Vitamin D3 600-400 MG-UNIT tablet, Take 1 tablet by mouth  2 (Two) Times a Day., Disp: 60 tablet, Rfl: 2    coenzyme Q10 100 MG capsule, Take 1 capsule by mouth Every Night., Disp: , Rfl:     cyanocobalamin (VITAMIN B-12) 1000 MCG tablet, Take 1 tablet by mouth Daily., Disp: , Rfl:     cycloSPORINE (RESTASIS) 0.05 % ophthalmic emulsion, 1 drop 2 (Two) Times a Day., Disp: , Rfl:     diltiaZEM (CARDIZEM) 30 MG tablet, Take 1 tablet by mouth As Needed (ESOPHAGEAL SPASM)., Disp: , Rfl:     ELDERBERRY PO, Take  by mouth., Disp: , Rfl:     EPINEPHrine (EPIPEN) 0.3 MG/0.3ML solution auto-injector injection, 0.3 mL., Disp: , Rfl:     fluticasone (FLONASE) 50 MCG/ACT nasal spray, 2 sprays by Each Nare route Daily., Disp: , Rfl:     hydrochlorothiazide (MICROZIDE) 12.5 MG capsule, Take 1 capsule by mouth Every Morning., Disp: , Rfl:     levothyroxine (SYNTHROID, LEVOTHROID) 100 MCG tablet, Take 1 tablet by mouth Daily., Disp: , Rfl:     levothyroxine (SYNTHROID, LEVOTHROID) 88 MCG tablet, Take 1 tablet by mouth Daily., Disp: , Rfl:     losartan (COZAAR) 50 MG tablet, Take 1 tablet by mouth 2 (Two) Times a Day., Disp: , Rfl:     Magnesium 500 MG tablet, Take  by mouth 3 (Three) Times a Week., Disp: , Rfl:     MECLIZINE HCL PO, Take 25 mg by mouth As Needed., Disp: , Rfl:     Mouthwashes (Biotene dry mouth) liquid liquid, Take 15 mL by mouth As Needed for Dry Mouth., Disp: 150 mL, Rfl: 1    multivitamin with minerals tablet tablet, Take 1 tablet by mouth Daily., Disp: , Rfl:     nitroglycerin (NITROSTAT) 0.4 MG SL tablet, Sublingual, Disp: , Rfl:     olmesartan (BENICAR) 20 MG tablet, Take 1 tablet by mouth Every 12 (Twelve) Hours., Disp: , Rfl:     Omega-3 Fatty Acids (fish oil) 500 MG capsule capsule, Take 1 capsule by mouth Daily., Disp: , Rfl:     ondansetron (ZOFRAN) 4 MG tablet, Take 1 tablet by mouth Every 8 (Eight) Hours As Needed. for nausea, Disp: , Rfl:     pantoprazole (PROTONIX) 20 MG EC tablet, Take 2 tablets by mouth Daily., Disp: , Rfl:     triamcinolone (KENALOG) 0.1 %  cream, APPLY TO affected AREA TWICE DAILY FOR FOURTEEN DAYS, Disp: , Rfl:     Turmeric (QC TUMERIC COMPLEX PO), Take 1 capsule by mouth Daily., Disp: , Rfl:     vitamin D (ERGOCALCIFEROL) 1.25 MG (96383 UT) capsule capsule, Take 1 capsule by mouth 1 (One) Time Per Week., Disp: , Rfl:     baclofen (LIORESAL) 10 MG tablet, TAKE ONE TABLET BY MOUTH EVERY EIGHT HOURS AS NEEDED FOR MUSCLE SPASM (Patient not taking: Reported on 4/8/2024), Disp: , Rfl:

## 2024-04-10 ENCOUNTER — PATIENT ROUNDING (BHMG ONLY) (OUTPATIENT)
Dept: GASTROENTEROLOGY | Facility: CLINIC | Age: 74
End: 2024-04-10
Payer: MEDICARE

## 2024-04-10 NOTE — PROGRESS NOTES
4/10/2024      Hello, may I speak with Dk Zuniga     My name is Patrick. I am calling from [facility]. I show that you had a recent visit with nurse/MA call.    Before we get started may I verify your date of birth? 1950    I am calling to officially welcome you to our practice and ask about your recent visit. Is this a good time to talk? No I left pt a VM    Tell me about your visit with us. What things went well?    We strive to ensure that we protect your safety and privacy. Is there anything we could have done to improve this during your visit?        We're always looking for ways to make our patients' experiences even better. Do you have recommendations on ways we may improve?    Overall were you satisfied with your first visit to our practice?    I appreciate you taking the time to speak with me today. Is there anything else I can do for you?    I am glad to hear that you had a very good visit and I appreciate you taking the time to provide feedback on this call. We would greatly appreciate you filling out a survey if you receive one in the mail, email or text. This is a great opportunity to provide any additional feedback that you may think of after this call as well.       Thank you, and have a great day.

## 2024-04-29 ENCOUNTER — OFFICE VISIT (OUTPATIENT)
Dept: PULMONOLOGY | Facility: CLINIC | Age: 74
End: 2024-04-29
Payer: MEDICARE

## 2024-04-29 VITALS
DIASTOLIC BLOOD PRESSURE: 69 MMHG | HEIGHT: 64 IN | SYSTOLIC BLOOD PRESSURE: 144 MMHG | RESPIRATION RATE: 16 BRPM | TEMPERATURE: 98.2 F | WEIGHT: 179.6 LBS | OXYGEN SATURATION: 98 % | HEART RATE: 66 BPM | BODY MASS INDEX: 30.66 KG/M2

## 2024-04-29 DIAGNOSIS — G47.33 OSA ON CPAP: ICD-10-CM

## 2024-04-29 DIAGNOSIS — J47.9 BRONCHIECTASIS WITHOUT ACUTE EXACERBATION: Primary | ICD-10-CM

## 2024-04-29 DIAGNOSIS — M35.00 SJOGREN'S SYNDROME, WITH UNSPECIFIED ORGAN INVOLVEMENT: ICD-10-CM

## 2024-04-29 PROCEDURE — 3078F DIAST BP <80 MM HG: CPT | Performed by: NURSE PRACTITIONER

## 2024-04-29 PROCEDURE — 3077F SYST BP >= 140 MM HG: CPT | Performed by: NURSE PRACTITIONER

## 2024-04-29 PROCEDURE — 99214 OFFICE O/P EST MOD 30 MIN: CPT | Performed by: NURSE PRACTITIONER

## 2024-04-29 PROCEDURE — 1159F MED LIST DOCD IN RCRD: CPT | Performed by: NURSE PRACTITIONER

## 2024-04-29 PROCEDURE — 1160F RVW MEDS BY RX/DR IN RCRD: CPT | Performed by: NURSE PRACTITIONER

## 2024-04-29 NOTE — PROGRESS NOTES
Primary Care Provider  Haritha Iraheta MD   Referring Provider  No ref. provider found    Patient Complaint  Apnea, Follow-up (1 YEAR), Lung Disease, and Pulmonary Clearance (Endoscopy)      SUBJECTIVE    History of Presenting Illness  Dk Zuniga is a pleasant 74 y.o. female who presents to [facility] for followup appointment. Patient last saw Dr. Cortes 4/20/2023. Patient has history of Sjogren's disease, dyspnea on exertion, bronchiectasis, thyroid nodule, seasonal allergies, and tobacco abuse of cigarettes in remission. She also has PATRICIA on CPAP with nasal pillows and is managed by sleep medicine. She receives allergy shots every other week. She is under care of Dr. Doyle for dry eyes with Sjogren's Syndrome. She has had no recent diagnostics on her lungs. She uses albuterol as needed and takes not other maintenance inhalers for her lungs.  Patient also needs surgical clearance for endoscopy that she is scheduled to have in June.    Denies dyspnea, coughing, wheezing, headaches, chest pain, weight loss or hemoptysis. Denies fevers, chills and night sweats. Dk Zuniga is able to perform ADLs without difficulties and denies any swollen glands/lymph nodes in the head or neck.    I have personally reviewed the review of systems, past family, social, medical and surgical histories; and agree with their findings.    Review of Systems  Constitutional symptoms:  Denied complaints   Ear, nose, throat: Denied complaints  Cardiovascular:  Denied complaints  Respiratory: Denied complaints  Gastrointestinal: Denied complaints  Musculoskeletal: Denied complaints    Family History   Problem Relation Age of Onset    Heart disease Mother     Heart disease Father         qaud bi pass    Colon cancer Paternal Grandmother     Colon cancer Paternal Grandfather     Malig Hyperthermia Neg Hx         Social History     Socioeconomic History    Marital status:    Tobacco  "Use    Smoking status: Former     Current packs/day: 0.00     Average packs/day: 1 pack/day for 18.0 years (18.0 ttl pk-yrs)     Types: Cigarettes     Start date: 1963     Quit date: 1981     Years since quittin.8     Passive exposure: Past    Smokeless tobacco: Never   Vaping Use    Vaping status: Never Used   Substance and Sexual Activity    Alcohol use: Yes     Alcohol/week: 1.0 standard drink of alcohol     Types: 1 Shots of liquor per week     Comment: daily-STOPPED WHEN COVID STARTED     Drug use: Never    Sexual activity: Defer        Past Medical History:   Diagnosis Date    Asthma     Atypical chest pain     STRESS TEST 2021 CLEARED BY DR. PANG 21 PER KATI     BPPV (benign paroxysmal positional vertigo), right     Dyslipidemia     Dysphasia     Fissure in skin     BREAST - HAD IMPLANTS REMOVED    GERD (gastroesophageal reflux disease)     Hyperlipidemia     Hypertension     Obstructive sleep apnea     Compliant with CPAP    Pneumothorax     X3 \"AS A YOUNG GIRL\"    PONV (postoperative nausea and vomiting)     Sjogren's syndrome     Thyroid nodule     Vertigo         Immunization History   Administered Date(s) Administered    COVID-19 (MODERNA) 1st,2nd,3rd Dose Monovalent 2021, 2021, 10/25/2021, 2022    COVID-19 (MODERNA) BIVALENT 12+YRS 10/05/2022    COVID-19 (MODERNA) Monovalent Original Booster 2021, 2021    Fluad Quad 65+ 2021    Fluzone High Dose =>65 Years ([employer] ONLY) 10/18/2022    Hepatitis A 2019    Influenza LAIV (Nasal) 2019, 2020    Pneumococcal, Unspecified 2021    Shingrix 2022       Allergies   Allergen Reactions    Effexor [Venlafaxine] Anaphylaxis    Wasp Venom Anaphylaxis    Latex Hives and Rash    Penicillins Rash    Tetanus Toxoids Hives and Swelling          Current Outpatient Medications:     5-Hydroxytryptophan (5-HTP PO), Take  by mouth., Disp: , Rfl:     albuterol sulfate  (90 Base) " MCG/ACT inhaler, Inhale 1 puff Every 6 (Six) Hours As Needed., Disp: , Rfl:     ALLERGY SERUM INJECTION, Inject  under the skin into the appropriate area as directed 1 (One) Time. Weekly, Disp: , Rfl:     aspirin 81 MG EC tablet, Daily., Disp: , Rfl:     atorvastatin (LIPITOR) 20 MG tablet, Take 1 tablet by mouth Every Night., Disp: , Rfl:     azelastine (ASTELIN) 0.1 % nasal spray, 1-2 sprays into the nostril(s) as directed by provider 2 (Two) Times a Day., Disp: , Rfl:     B Complex Vitamins (B COMPLEX 1 PO), Take  by mouth., Disp: , Rfl:     BIOTIN PO, Take  by mouth., Disp: , Rfl:     coenzyme Q10 100 MG capsule, Take 1 capsule by mouth Every Night., Disp: , Rfl:     cycloSPORINE (RESTASIS) 0.05 % ophthalmic emulsion, 1 drop 2 (Two) Times a Day., Disp: , Rfl:     diltiaZEM (CARDIZEM) 30 MG tablet, Take 1 tablet by mouth As Needed (ESOPHAGEAL SPASM)., Disp: , Rfl:     fluticasone (FLONASE) 50 MCG/ACT nasal spray, 2 sprays by Each Nare route Daily., Disp: , Rfl:     Folic Acid (FOLATE PO), Take  by mouth., Disp: , Rfl:     hydrochlorothiazide (MICROZIDE) 12.5 MG capsule, Take 1 capsule by mouth Every Morning., Disp: , Rfl:     L-THEANINE PO, Take  by mouth., Disp: , Rfl:     levOCARNitine (L-CARNITINE PO), Take  by mouth., Disp: , Rfl:     levothyroxine (SYNTHROID, LEVOTHROID) 88 MCG tablet, Take 75 mcg by mouth Daily. 75MCG, Disp: , Rfl:     Magnesium 500 MG tablet, Take  by mouth 3 (Three) Times a Week., Disp: , Rfl:     olmesartan (BENICAR) 20 MG tablet, Take 1 tablet by mouth Every 12 (Twelve) Hours., Disp: , Rfl:     Turmeric (QC TUMERIC COMPLEX PO), Take 1 capsule by mouth Daily., Disp: , Rfl:     vitamin D (ERGOCALCIFEROL) 1.25 MG (60089 UT) capsule capsule, Take 1 capsule by mouth 1 (One) Time Per Week., Disp: , Rfl:     amLODIPine-benazepril (LOTREL 5-10) 5-10 MG per capsule, Take 0.5 capsules by mouth Daily. (Patient not taking: Reported on 4/29/2024), Disp: , Rfl:     baclofen (LIORESAL) 10 MG tablet,  TAKE ONE TABLET BY MOUTH EVERY EIGHT HOURS AS NEEDED FOR MUSCLE SPASM (Patient not taking: Reported on 4/8/2024), Disp: , Rfl:     busPIRone (BUSPAR) 10 MG tablet, , Disp: , Rfl:     Calcium Carbonate-Vitamin D3 600-400 MG-UNIT tablet, Take 1 tablet by mouth 2 (Two) Times a Day. (Patient not taking: Reported on 4/29/2024), Disp: 60 tablet, Rfl: 2    cyanocobalamin (VITAMIN B-12) 1000 MCG tablet, Take 1 tablet by mouth Daily. (Patient not taking: Reported on 4/29/2024), Disp: , Rfl:     ELDERBERRY PO, Take  by mouth. (Patient not taking: Reported on 4/29/2024), Disp: , Rfl:     EPINEPHrine (EPIPEN) 0.3 MG/0.3ML solution auto-injector injection, 0.3 mL. (Patient not taking: Reported on 4/29/2024), Disp: , Rfl:     levothyroxine (SYNTHROID, LEVOTHROID) 100 MCG tablet, Take 1 tablet by mouth Daily. (Patient not taking: Reported on 4/29/2024), Disp: , Rfl:     losartan (COZAAR) 50 MG tablet, Take 1 tablet by mouth 2 (Two) Times a Day. (Patient not taking: Reported on 4/29/2024), Disp: , Rfl:     MECLIZINE HCL PO, Take 25 mg by mouth As Needed. (Patient not taking: Reported on 4/29/2024), Disp: , Rfl:     Mouthwashes (Biotene dry mouth) liquid liquid, Take 15 mL by mouth As Needed for Dry Mouth. (Patient not taking: Reported on 4/29/2024), Disp: 150 mL, Rfl: 1    multivitamin with minerals tablet tablet, Take 1 tablet by mouth Daily. (Patient not taking: Reported on 4/29/2024), Disp: , Rfl:     nitroglycerin (NITROSTAT) 0.4 MG SL tablet, Sublingual (Patient not taking: Reported on 4/29/2024), Disp: , Rfl:     Omega-3 Fatty Acids (fish oil) 500 MG capsule capsule, Take 1 capsule by mouth Daily. (Patient not taking: Reported on 4/29/2024), Disp: , Rfl:     ondansetron (ZOFRAN) 4 MG tablet, Take 1 tablet by mouth Every 8 (Eight) Hours As Needed. for nausea (Patient not taking: Reported on 4/29/2024), Disp: , Rfl:     pantoprazole (PROTONIX) 20 MG EC tablet, Take 2 tablets by mouth Daily. (Patient not taking: Reported on  "4/29/2024), Disp: , Rfl:     triamcinolone (KENALOG) 0.1 % cream, APPLY TO affected AREA TWICE DAILY FOR FOURTEEN DAYS (Patient not taking: Reported on 4/29/2024), Disp: , Rfl:            Vital Signs   /69 (BP Location: Left arm, Patient Position: Sitting, Cuff Size: Adult)   Pulse 66   Temp 98.2 °F (36.8 °C) (Oral)   Resp 16   Ht 162.6 cm (64.02\")   Wt 81.5 kg (179 lb 9.6 oz)   SpO2 98% Comment: ROOM AIR  BMI 30.81 kg/m²       OBJECTIVE    Physical Exam  Vital Signs Reviewed   WDWN, Alert, NAD.    HEENT:  PERRL, EOMI.  OP, nares clear  Neck:  Supple, no JVD, no thyromegaly  Chest:  good aeration, clear to auscultation bilaterally, tympanic to percussion bilaterally, no work of breathing noted  CV: RRR, no MGR, pulses 2+, equal.  Abd:  Soft, NT, ND, + BS, no HSM  EXT:  no clubbing, no cyanosis, no edema  Neuro:  A&Ox3, CN grossly intact, no focal deficits.  Skin: No rashes or lesions noted    Results Review  I have personally reviewed the prior office notes, hospital records, labs, and diagnostics.  Results  Complete PFT - Pre & Post Bronchodilator (Order [id number])  Order-Level Documents:    Scan on 12/28/2023 1732 by Haritha Iraheta MD: PULMONARY FUNCTION TEST, [facility], 12/28/2023         Author: -- Service: -- Author Type: --   Filed: Date of Service: Creation Time:   Status: (Other)      Pulmonary Function Test Interpretation  Dk Zuniga  [id number]     01/05/24  09:44 EST     Spirometry  Spirometry demonstrates no airway obstruction.     Post Bronchodilator  Following the inhalation of a bronchodilator, there is no significant change in airway obstruction. This does not necessrily mean that chronic bronchodilator therapy may not be useful.     MVV  N/A     Lung Volume  Lung volumes are normal.     Diffusion  The diffusing capacity for carbon monoxide, a reflection of alveolar-capillary gas transport, is normal.     Study Comparison  N/A     Further Work-Up  N/A     Impression:  Normal " spirometry.  Normal lung volumes.  Normal DLCO.     Conclusion:  Normal pulmonary function study     Study date: 12/28/2023           ASSESSMENT         Patient Active Problem List   Diagnosis    Lisfranc dislocation    Nondisplaced fracture of third metatarsal bone of left foot with routine healing    Nondisplaced fracture of fourth metatarsal bone of left foot with routine healing    Foot fracture, left    Reflux esophagitis    PATRICIA on CPAP    Dysphagia    Elevated antinuclear antibody (JUICE) level    Essential hypertension    Family history of colon cancer    Lung disease    GERD (gastroesophageal reflux disease)    High cholesterol    History of dysphagia    Localized, primary osteoarthritis of hand    Personal history of other diseases of the circulatory system    Sjogren's disease    Spontaneous pneumothorax    Tendinitis of flexor tendon of right hand    Class 1 obesity       Encounter Diagnoses   Name Primary?    Bronchiectasis without acute exacerbation Yes    Sjogren's syndrome, with unspecified organ involvement     PATRICIA on CPAP       PLAN  Dk Zuniga is cleared for surgery with moderate risk for perioperative complications.  Patient has sleep apnea, Sjogren's disease, bronchiectasis, and history of tobacco abuse.  Preoperative bronchodilators via nebulizer, incentive spirometry, early ambulation and use of noninvasive positive pressure ventilation as soon as the patient is extubated would help recommend postoperative nebulizers incentive spirometer as well as flutter to help decrease risk of atelectasis and pneumonia.  -Albuterol as needed for shortness of air or wheeze  -Follow-up with sleep medicine for her PATRICIA with CPAP  -Return to office in 1 year or sooner if needed    Diagnoses and all orders for this visit:    1. Bronchiectasis without acute exacerbation (Primary)    2. Sjogren's syndrome, with unspecified organ involvement    3. PATRICIA on CPAP           Smoking status:former, quit 1981  Vaccination  status:reviewed  Medications personally reviewed    Follow Up  Return in about 1 year (around 4/29/2025).    Patient was given instructions and counseling regarding her condition or for health maintenance advice. Please see specific information pulled into the AVS if appropriate.     I spent 20 minutes caring for Dk Zuniga on this date of service. This time includes time spent by me in the following activities:preparing for the visit, reviewing tests, obtaining and/or reviewing a separately obtained history, performing a medically appropriate examination and/or evaluation, counseling and educating the patient/family/caregiver, ordering medications, tests, or procedures, documenting information in the medical record, independently interpreting results and communicating that information with the patient/family/caregiver and answered questions family members, discuss medications.

## 2024-05-23 ENCOUNTER — TELEPHONE (OUTPATIENT)
Dept: GASTROENTEROLOGY | Facility: CLINIC | Age: 74
End: 2024-05-23
Payer: MEDICARE

## 2024-05-23 NOTE — TELEPHONE ENCOUNTER
2024    Dear Talia CRAWFORD,      Patient Name: Dk Zuniga  : 1950      This patient is waiting to have a Colonoscopy and/or Esophagogastroduodenoscopy which I will perform at [facility] on 2024. Please respond to this request noting your recommendations regarding clearance from a Pulmonary  standpoint.  You may contact our office at [phone] Option 2 with any questions. I appreciate your prompt response in this matter. Please return this form to our office as soon as possible to [phone].    ____ I approve my patient from a Pulmonary  standpoint    ____ I do NOT approve my patient from a Pulmonary  standpoint at this time      Please specify clearance expiration date:____________________________________      Approving physician name (please print): _____________________________________________      Approving physician signature: ________________________________ Date:________________  Sincerely,  [facility] - Gastroenterology   Dr. Kang          Please fax approval or denial to our office as soon as possible.

## 2024-05-30 ENCOUNTER — TELEPHONE (OUTPATIENT)
Dept: GASTROENTEROLOGY | Facility: CLINIC | Age: 74
End: 2024-05-30
Payer: MEDICARE

## 2024-05-30 NOTE — TELEPHONE ENCOUNTER
Called  office for update on clearance. Spoke with Lexi who stated there is an open conversation going on about clearance and waiting for provider to sign off on it. She asked for our fax and phone number. I gave it to her and let her know on our clearance letter the instructions are on there.   Will F/u for an update if no rec'd by Monday June 3rd 2024.

## 2024-06-10 NOTE — PAT
Reminded of arrival time at    0730am     , Entrance C of the [facility] hospital. Instructed to bring or have a  over the age of 18 set up to drive you home the day of procedure.    Reminded them not to eat or drink anything am of procedure unless its a sip of water with medications.

## 2024-06-17 ENCOUNTER — ANESTHESIA EVENT (OUTPATIENT)
Dept: GASTROENTEROLOGY | Facility: HOSPITAL | Age: 74
End: 2024-06-17
Payer: MEDICARE

## 2024-06-17 NOTE — ANESTHESIA PREPROCEDURE EVALUATION
Anesthesia Evaluation     Patient summary reviewed and Nursing notes reviewed   history of anesthetic complications:  PONV  NPO Solid Status: > 8 hours  NPO Liquid Status: > 2 hours           Airway   Mallampati: III  TM distance: >3 FB  Neck ROM: full  Small opening and Possible difficult intubation  Dental - normal exam     Pulmonary - normal exam   (+) asthma,sleep apnea on CPAP  Cardiovascular - normal exam    (+) hypertension 2 medications or greater, hyperlipidemia      Neuro/Psych  (+) dizziness/light headedness (hx of vertigo)  GI/Hepatic/Renal/Endo    (+) GERD, thyroid problem hypothyroidism and thyroid nodules    Musculoskeletal     Abdominal    Substance History      OB/GYN          Other   arthritis,     ROS/Med Hx Other: Sjogren's syndrome    Cardiac Clearance from Dr. Guillory    Echo 12/4/23:  Very technically difficult study with limited views. Left ventricular ejection fraction appears to be 61 - 65%.  No obvious wall motion abnormalities. Left ventricular diastolic function is consistent with (grade I) impaired relaxation and age.Valves are not well-visualized but no significant valvular disease by Doppler.    No recent EKG/Stress Test                         Anesthesia Plan    ASA 2     general   total IV anesthesia  (Total IV Anesthesia    Patient understands anesthesia not responsible for dental damage.  )  intravenous induction     Anesthetic plan, risks, benefits, and alternatives have been provided, discussed and informed consent has been obtained with: patient.  Pre-procedure education provided  Plan discussed with CRNA.      CODE STATUS:

## 2024-06-18 ENCOUNTER — ANESTHESIA (OUTPATIENT)
Dept: GASTROENTEROLOGY | Facility: HOSPITAL | Age: 74
End: 2024-06-18
Payer: MEDICARE

## 2024-06-18 ENCOUNTER — HOSPITAL ENCOUNTER (OUTPATIENT)
Facility: HOSPITAL | Age: 74
Setting detail: HOSPITAL OUTPATIENT SURGERY
Discharge: HOME | End: 2024-06-18
Attending: INTERNAL MEDICINE | Admitting: INTERNAL MEDICINE
Payer: MEDICARE

## 2024-06-18 VITALS
HEIGHT: 63 IN | DIASTOLIC BLOOD PRESSURE: 71 MMHG | BODY MASS INDEX: 32.34 KG/M2 | OXYGEN SATURATION: 95 % | RESPIRATION RATE: 20 BRPM | TEMPERATURE: 97.3 F | HEART RATE: 66 BPM | WEIGHT: 182.54 LBS | SYSTOLIC BLOOD PRESSURE: 131 MMHG

## 2024-06-18 DIAGNOSIS — K21.9 GASTROESOPHAGEAL REFLUX DISEASE, UNSPECIFIED WHETHER ESOPHAGITIS PRESENT: ICD-10-CM

## 2024-06-18 PROCEDURE — 25810000003 LACTATED RINGERS PER 1000 ML

## 2024-06-18 PROCEDURE — 25010000002 ONDANSETRON PER 1 MG

## 2024-06-18 PROCEDURE — 88305 TISSUE EXAM BY PATHOLOGIST: CPT | Performed by: INTERNAL MEDICINE

## 2024-06-18 PROCEDURE — 25010000002 PROPOFOL 10 MG/ML EMULSION

## 2024-06-18 RX ORDER — PANTOPRAZOLE SODIUM 40 MG/1
40 TABLET, DELAYED RELEASE ORAL DAILY
Qty: 30 TABLET | Refills: 5 | Status: SHIPPED | OUTPATIENT
Start: 2024-06-18

## 2024-06-18 RX ORDER — SODIUM CHLORIDE, SODIUM LACTATE, POTASSIUM CHLORIDE, CALCIUM CHLORIDE 600; 310; 30; 20 MG/100ML; MG/100ML; MG/100ML; MG/100ML
30 INJECTION, SOLUTION INTRAVENOUS CONTINUOUS
Status: DISCONTINUED | OUTPATIENT
Start: 2024-06-18 | End: 2024-06-18 | Stop reason: HOSPADM

## 2024-06-18 RX ORDER — ONDANSETRON 2 MG/ML
4 INJECTION INTRAMUSCULAR; INTRAVENOUS ONCE
Status: COMPLETED | OUTPATIENT
Start: 2024-06-18 | End: 2024-06-18

## 2024-06-18 RX ORDER — PROPOFOL 10 MG/ML
VIAL (ML) INTRAVENOUS AS NEEDED
Status: DISCONTINUED | OUTPATIENT
Start: 2024-06-18 | End: 2024-06-18 | Stop reason: SURG

## 2024-06-18 RX ORDER — SODIUM CHLORIDE, SODIUM LACTATE, POTASSIUM CHLORIDE, CALCIUM CHLORIDE 600; 310; 30; 20 MG/100ML; MG/100ML; MG/100ML; MG/100ML
INJECTION, SOLUTION INTRAVENOUS CONTINUOUS PRN
Status: DISCONTINUED | OUTPATIENT
Start: 2024-06-18 | End: 2024-06-18 | Stop reason: SURG

## 2024-06-18 RX ADMIN — PROPOFOL 100 MG: 10 INJECTION, EMULSION INTRAVENOUS at 10:04

## 2024-06-18 RX ADMIN — ONDANSETRON 4 MG: 2 INJECTION INTRAMUSCULAR; INTRAVENOUS at 09:23

## 2024-06-18 RX ADMIN — SODIUM CHLORIDE, POTASSIUM CHLORIDE, SODIUM LACTATE AND CALCIUM CHLORIDE: 600; 310; 30; 20 INJECTION, SOLUTION INTRAVENOUS at 09:58

## 2024-06-18 RX ADMIN — PROPOFOL 200 MCG/KG/MIN: 10 INJECTION, EMULSION INTRAVENOUS at 10:05

## 2024-06-18 NOTE — H&P
"Pre Procedure History & Physical    Chief Complaint:   Persistent heartburn    Subjective     HPI:   Heartburn    Past Medical History:   Past Medical History:   Diagnosis Date    Asthma     Atypical chest pain     STRESS TEST 6/21/2021 CLEARED BY DR. PANG 6/22/21 PER KATI     BPPV (benign paroxysmal positional vertigo), right     Dyslipidemia     Dysphasia     Fissure in skin     BREAST - HAD IMPLANTS REMOVED    GERD (gastroesophageal reflux disease)     Hyperlipidemia     Hypertension     Obstructive sleep apnea     Compliant with CPAP    Pneumothorax     X3 \"AS A YOUNG GIRL\"    PONV (postoperative nausea and vomiting)     Sjogren's syndrome     Thyroid nodule     Vertigo        Past Surgical History:  Past Surgical History:   Procedure Laterality Date    APPENDECTOMY      WITH HYST    BREAST IMPLANT SURGERY Bilateral     REMOVED     CHOLECYSTECTOMY      LAP    COLONOSCOPY      COLONOSCOPY N/A 06/23/2021    Procedure: COLONOSCOPY;  Surgeon: Marcio Kang MD;  Location: [facility] ENDOSCOPY;  Service: Gastroenterology;  Laterality: N/A;  NORMAL COLON    ENDOSCOPY N/A 06/23/2021    Procedure: ESOPHAGOGASTRODUODENOSCOPY WITH BIOPSIES;  Surgeon: Marico Kang MD;  Location: [facility] ENDOSCOPY;  Service: Gastroenterology;  Laterality: N/A;  REFLUX ESOPHAGITIS    HYSTERECTOMY      PLEURAL SCARIFICATION Right     THYROIDECTOMY Right 5/18/2022    Procedure: RIGHT THYROIDECTOMY WITH FROZEN SECTION, POSSIBLE TOTAL, RECURRENT LARYNGEAL NERVE MONITORING;  Surgeon: Mauricio Friedman MD;  Location: [facility] MAIN OR;  Service: ENT;  Laterality: Right;    TONSILLECTOMY         Family History:  Family History   Problem Relation Age of Onset    Heart disease Mother     Heart disease Father         qaud bi pass    Colon cancer Paternal Grandmother     Colon cancer Paternal Grandfather     Malig Hyperthermia Neg Hx        Social History:   reports that she quit smoking about 43 years ago. Her smoking use included cigarettes. " She started smoking about 61 years ago. She has a 18 pack-year smoking history. She has been exposed to tobacco smoke. She has never used smokeless tobacco. She reports current alcohol use of about 1.0 standard drink of alcohol per week. She reports that she does not use drugs.    Medications:   Medications Prior to Admission   Medication Sig Dispense Refill Last Dose    5-Hydroxytryptophan (5-HTP PO) Take  by mouth.       albuterol sulfate  (90 Base) MCG/ACT inhaler Inhale 1 puff Every 6 (Six) Hours As Needed.       ALLERGY SERUM INJECTION Inject  under the skin into the appropriate area as directed 1 (One) Time. Weekly       aspirin 81 MG EC tablet Daily.       atorvastatin (LIPITOR) 20 MG tablet Take 1 tablet by mouth Every Night.       azelastine (ASTELIN) 0.1 % nasal spray 1-2 sprays into the nostril(s) as directed by provider 2 (Two) Times a Day.       B Complex Vitamins (B COMPLEX 1 PO) Take  by mouth.       BIOTIN PO Take  by mouth.       coenzyme Q10 100 MG capsule Take 1 capsule by mouth Every Night.       cycloSPORINE (RESTASIS) 0.05 % ophthalmic emulsion 1 drop 2 (Two) Times a Day.       diltiaZEM (CARDIZEM) 30 MG tablet Take 1 tablet by mouth As Needed (ESOPHAGEAL SPASM).       EPINEPHrine (EPIPEN) 0.3 MG/0.3ML solution auto-injector injection 0.3 mL. (Patient not taking: Reported on 4/29/2024)       fluticasone (FLONASE) 50 MCG/ACT nasal spray 2 sprays by Each Nare route Daily.       Folic Acid (FOLATE PO) Take  by mouth.       hydrochlorothiazide (MICROZIDE) 12.5 MG capsule Take 1 capsule by mouth Every Morning.       L-THEANINE PO Take  by mouth.       levOCARNitine (L-CARNITINE PO) Take  by mouth.       levothyroxine (SYNTHROID, LEVOTHROID) 88 MCG tablet Take 75 mcg by mouth Daily. 75MCG       Magnesium 500 MG tablet Take  by mouth 3 (Three) Times a Week.       olmesartan (BENICAR) 20 MG tablet Take 1 tablet by mouth Every 12 (Twelve) Hours.       Turmeric (QC TUMERIC COMPLEX PO) Take 1  "capsule by mouth Daily.       vitamin D (ERGOCALCIFEROL) 1.25 MG (24953 UT) capsule capsule Take 1 capsule by mouth 1 (One) Time Per Week.          Allergies:  Effexor [venlafaxine], Wasp venom, Latex, Penicillins, and Tetanus toxoids        Objective     Blood pressure 146/57, pulse 72, temperature 97 °F (36.1 °C), temperature source Temporal, resp. rate 20, height 160 cm (63\"), weight 82.8 kg (182 lb 8.7 oz), SpO2 99%.    Physical Exam   Constitutional: Pt is oriented to person, place, and time and well-developed, well-nourished, and in no distress.   Mouth/Throat: Oropharynx is clear and moist.   Neck: Normal range of motion.   Cardiovascular: Normal rate, regular rhythm and normal heart sounds.    Pulmonary/Chest: Effort normal and breath sounds normal.   Abdominal: Soft. Nontender  Skin: Skin is warm and dry.   Psychiatric: Mood, memory, affect and judgment normal.     Assessment & Plan     Diagnosis:  Persistent heartburn    Anticipated Surgical Procedure:  EGD    The risks, benefits, and alternatives of this procedure have been discussed with the patient or the responsible party- the patient understands and agrees to proceed.            "

## 2024-06-18 NOTE — ANESTHESIA POSTPROCEDURE EVALUATION
Patient: Dk Zuniga    Procedure Summary       Date: 06/18/24 Room / Location: [facility] ENDOSCOPY 4 / [facility] ENDOSCOPY    Anesthesia Start: 0958 Anesthesia Stop: 1021    Procedure: ESOPHAGOGASTRODUODENOSCOPY WITH BIOPSIES Diagnosis:       Gastroesophageal reflux disease, unspecified whether esophagitis present      (Gastroesophageal reflux disease, unspecified whether esophagitis present [K21.9])    Surgeons: Marcio Kang MD Provider: Ginger Gonzalez CRNA    Anesthesia Type: general ASA Status: 2            Anesthesia Type: general    Vitals  Vitals Value Taken Time   /71 06/18/24 1036   Temp 36.3 °C (97.3 °F) 06/18/24 1035   Pulse 67 06/18/24 1038   Resp 20 06/18/24 1035   SpO2 100 % 06/18/24 1038   Vitals shown include unfiled device data.        Post Anesthesia Care and Evaluation    Post-procedure mental status: acceptable.  Pain management: satisfactory to patient    Airway patency: patent  Anesthetic complications: No anesthetic complications    Cardiovascular status: acceptable  Respiratory status: acceptable    Comments: Per chart review

## 2024-06-19 LAB
CYTO UR: NORMAL
LAB AP CASE REPORT: NORMAL
LAB AP CLINICAL INFORMATION: NORMAL
LAB AP DIAGNOSIS COMMENT: NORMAL
PATH REPORT.FINAL DX SPEC: NORMAL
PATH REPORT.GROSS SPEC: NORMAL

## 2024-06-21 ENCOUNTER — TELEPHONE (OUTPATIENT)
Dept: GASTROENTEROLOGY | Facility: CLINIC | Age: 74
End: 2024-06-21
Payer: MEDICARE

## 2024-06-21 NOTE — TELEPHONE ENCOUNTER
----- Message from Mesha Mcnamara sent at 6/19/2024  9:37 AM EDT -----  I have reviewed the patients upper endoscopy and pathology. Esophageal biopsies show  increased eosinophils. Biopsies are negative for H. Pylori, dysplasia, metaplasia, and malignancy. Recommend continue Protonix 40mg daily.

## 2024-06-21 NOTE — TELEPHONE ENCOUNTER
Spoke to patient and informed of TY Dowling result note and recommendations. Verified patient understanding.    Educated on EoE.  Confirmed patient is taking pantoprazole. Educated on why and how best to take PPI medications.      Educated on lifestyle modifications to help decrease acid reflux including:  losing weight, avoiding trigger foods, avoid laying down within 3 hours of eating and elevating HOB.    Patient does not report any GI issues or concerns at this time. Educated to contact the office if any arise.

## 2024-07-01 ENCOUNTER — TELEPHONE (OUTPATIENT)
Dept: OTOLARYNGOLOGY | Facility: CLINIC | Age: 74
End: 2024-07-01
Payer: MEDICARE

## 2024-07-01 DIAGNOSIS — E03.9 HYPOTHYROIDISM, UNSPECIFIED TYPE: Primary | ICD-10-CM

## 2024-07-01 NOTE — TELEPHONE ENCOUNTER
Provider: DR DANITZA Soria: TIFFANIE SÁNCHEZ    Relationship to Patient: SELF    Reason for Call: PT CALLED TO SCHEDULE 1 YR FOLLOW UP FOR THYROID CHECK DUE AROUND 8/7/24    NO AVAILABILITY UNTIL JAN 2025    PT ADVISED HER PCP HAS LOWERED HER DOSAGE ON HER LEVOTHYROXINE TO 75MG.  PT THINKS HER MEDS NEED TO BE LOWERED AGAIN.    PT ALSO HAS LAB ORDERS FROM OLD OFFICE, PLEASE CALL PT TO CONFIRM IF THOSE LAB ORDERS ARE STILL GOOD, OR IF NEW ORDERS WILL BE PLACED.    PLEASE CALL PT TO DISCUSS SCHEDULE

## 2024-07-02 ENCOUNTER — TELEPHONE (OUTPATIENT)
Dept: OTOLARYNGOLOGY | Facility: CLINIC | Age: 74
End: 2024-07-02
Payer: MEDICARE

## 2024-08-14 ENCOUNTER — LAB (OUTPATIENT)
Dept: LAB | Facility: HOSPITAL | Age: 74
End: 2024-08-14
Payer: MEDICARE

## 2024-08-14 DIAGNOSIS — E03.9 HYPOTHYROIDISM, UNSPECIFIED TYPE: ICD-10-CM

## 2024-08-14 LAB
T4 FREE SERPL-MCNC: 1.05 NG/DL (ref 0.92–1.68)
TSH SERPL DL<=0.05 MIU/L-ACNC: 2.92 UIU/ML (ref 0.27–4.2)

## 2024-08-14 PROCEDURE — 84439 ASSAY OF FREE THYROXINE: CPT

## 2024-08-14 PROCEDURE — 84443 ASSAY THYROID STIM HORMONE: CPT

## 2024-08-14 PROCEDURE — 36415 COLL VENOUS BLD VENIPUNCTURE: CPT

## 2024-08-29 ENCOUNTER — OFFICE VISIT (OUTPATIENT)
Dept: OTOLARYNGOLOGY | Facility: CLINIC | Age: 74
End: 2024-08-29
Payer: MEDICARE

## 2024-08-29 VITALS — BODY MASS INDEX: 32.99 KG/M2 | WEIGHT: 186.2 LBS | TEMPERATURE: 97.6 F | HEIGHT: 63 IN

## 2024-08-29 DIAGNOSIS — E89.0 STATUS POST PARTIAL THYROIDECTOMY: ICD-10-CM

## 2024-08-29 DIAGNOSIS — M35.00 SJOGREN'S SYNDROME, WITH UNSPECIFIED ORGAN INVOLVEMENT: ICD-10-CM

## 2024-08-29 DIAGNOSIS — E03.9 HYPOTHYROIDISM (ACQUIRED): Primary | ICD-10-CM

## 2024-08-29 RX ORDER — LEVOTHYROXINE SODIUM 88 UG/1
88 TABLET ORAL DAILY
Qty: 90 TABLET | Refills: 2 | Status: SHIPPED | OUTPATIENT
Start: 2024-08-29

## 2024-08-29 RX ORDER — HYDROCHLOROTHIAZIDE 12.5 MG/1
12.5 TABLET ORAL EVERY MORNING
COMMUNITY
Start: 2024-07-16

## 2024-08-29 RX ORDER — LEVOTHYROXINE SODIUM 75 UG/1
1 TABLET ORAL DAILY
COMMUNITY
Start: 2024-05-06 | End: 2024-08-29 | Stop reason: SDUPTHER

## 2024-08-29 RX ORDER — AZELAIC ACID 0.15 G/G
GEL TOPICAL
COMMUNITY
Start: 2024-06-27

## 2024-08-29 NOTE — PROGRESS NOTES
"Patient Name: Dk Zuniga   Visit Date: 08/29/2024   Patient ID: [id number]  Provider: Mauricio Friedmna MD    Sex: female  Location: [facility]   YOB: 1950  Location Address: [address] [place] [address], [address] [place] [address],?[place]?[id number]    Primary Care Provider Haritha Iraheta MD  Location Phone: [phone]    Referring Provider: No ref. provider found        Chief Complaint  lab results    History of Present Illness  Dk Zuniga is a 74 y.o. female who presents to [facility] for lab results.  Patient has history of right thyroidectomy on 5/18/2022 for benign disease.  Patient also has been diagnosed with Sjogren's disease and on Restasis.  Patient also has obstructive sleep apnea which she is on CPAP.  Currently patient's hypothyroidism is supplemented with levothyroxine 88 mcg p.o. daily.  Patient was last seen by Dr. Friedman at [facility] the ENT clinic on 8/7/2023.  Her labs on 8/2/2023 show free T4 of 1.38 and TSH of 1.04.  Patient is here for 1 year follow-up with thyroid function test lab.  Patient reports that in December 2023 or January of this year, family physician when I had not obtained the thyroid lab at elsewhere and told her that it was thyroid toxic and decreased her dose to 75 mcg p.o. daily.  Since then patient has been tired and sleeping a lot.    Past Medical History:   Diagnosis Date    Asthma     Atypical chest pain     STRESS TEST 6/21/2021 CLEARED BY DR. PANG 6/22/21 PER KATI     BPPV (benign paroxysmal positional vertigo), right     Dyslipidemia     Dysphasia     Fissure in skin     BREAST - HAD IMPLANTS REMOVED    GERD (gastroesophageal reflux disease)     Hyperlipidemia     Hypertension     Obstructive sleep apnea     Compliant with CPAP    Pneumothorax     X3 \"AS A YOUNG GIRL\"    PONV (postoperative nausea and vomiting)     Sjogren's syndrome     Thyroid nodule     Vertigo        Past Surgical History:   Procedure Laterality " Date    APPENDECTOMY      WITH HYST    BREAST IMPLANT SURGERY Bilateral     REMOVED     CHOLECYSTECTOMY      LAP    COLONOSCOPY      COLONOSCOPY N/A 06/23/2021    Procedure: COLONOSCOPY;  Surgeon: Marcio Kang MD;  Location: [facility] END[facility];  Service: Gastroenterology;  Laterality: N/A;  NORMAL COLON    ENDOSCOPY N/A 06/23/2021    Procedure: ESOPHAGOGASTRODUODENOSCOPY WITH BIOPSIES;  Surgeon: Marcio Kang MD;  Location: [facility] ENDOSCOPY;  Service: Gastroenterology;  Laterality: N/A;  REFLUX ESOPHAGITIS    ENDOSCOPY N/A 6/18/2024    Procedure: ESOPHAGOGASTRODUODENOSCOPY WITH BIOPSIES;  Surgeon: Marcio Kang MD;  Location: [facility] ENDOSCOPY;  Service: Gastroenterology;  Laterality: N/A;  ESOPHAGITIS, HIATAL HERNIA    HYSTERECTOMY      PLEURAL SCARIFICATION Right     THYROIDECTOMY Right 5/18/2022    Procedure: RIGHT THYROIDECTOMY WITH FROZEN SECTION, POSSIBLE TOTAL, RECURRENT LARYNGEAL NERVE MONITORING;  Surgeon: Mauricio Friedman MD;  Location: [facility] MAIN OR;  Service: ENT;  Laterality: Right;    TONSILLECTOMY           Current Outpatient Medications:     5-Hydroxytryptophan (5-HTP PO), Take  by mouth., Disp: , Rfl:     albuterol sulfate  (90 Base) MCG/ACT inhaler, Inhale 1 puff Every 6 (Six) Hours As Needed., Disp: , Rfl:     ALLERGY SERUM INJECTION, Inject  under the skin into the appropriate area as directed 1 (One) Time. Weekly, Disp: , Rfl:     aspirin 81 MG EC tablet, Daily., Disp: , Rfl:     atorvastatin (LIPITOR) 20 MG tablet, Take 1 tablet by mouth Every Night., Disp: , Rfl:     azelaic acid (AZELEX) 15 % gel, APPLY TO FACE AT BEDTIME FOR ROSACEA, Disp: , Rfl:     azelastine (ASTELIN) 0.1 % nasal spray, 1-2 sprays into the nostril(s) as directed by provider 2 (Two) Times a Day., Disp: , Rfl:     B Complex Vitamins (B COMPLEX 1 PO), Take  by mouth., Disp: , Rfl:     BIOTIN PO, Take  by mouth., Disp: , Rfl:     coenzyme Q10 100 MG capsule, Take 1 capsule by mouth Every Night.,  Disp: , Rfl:     cycloSPORINE (RESTASIS) 0.05 % ophthalmic emulsion, 1 drop 2 (Two) Times a Day., Disp: , Rfl:     diltiaZEM (CARDIZEM) 30 MG tablet, Take 1 tablet by mouth As Needed (ESOPHAGEAL SPASM)., Disp: , Rfl:     fluticasone (FLONASE) 50 MCG/ACT nasal spray, 2 sprays by Each Nare route Daily., Disp: , Rfl:     Folic Acid (FOLATE PO), Take  by mouth., Disp: , Rfl:     hydrochlorothiazide (MICROZIDE) 12.5 MG capsule, Take 1 capsule by mouth Every Morning., Disp: , Rfl:     hydroCHLOROthiazide 12.5 MG tablet, Take 1 tablet by mouth Every Morning., Disp: , Rfl:     L-THEANINE PO, Take  by mouth., Disp: , Rfl:     levOCARNitine (L-CARNITINE PO), Take  by mouth., Disp: , Rfl:     levothyroxine (SYNTHROID, LEVOTHROID) 88 MCG tablet, Take 1 tablet by mouth Daily. 75MCG, Disp: 90 tablet, Rfl: 2    Magnesium 500 MG tablet, Take  by mouth 3 (Three) Times a Week., Disp: , Rfl:     olmesartan (BENICAR) 20 MG tablet, Take 1 tablet by mouth 2 (Two) Times a Day., Disp: , Rfl:     pantoprazole (PROTONIX) 40 MG EC tablet, Take 1 tablet by mouth Daily., Disp: 30 tablet, Rfl: 5    Turmeric (QC TUMERIC COMPLEX PO), Take 1 capsule by mouth Daily., Disp: , Rfl:     vitamin D (ERGOCALCIFEROL) 1.25 MG (94351 UT) capsule capsule, Take 1 capsule by mouth 1 (One) Time Per Week., Disp: , Rfl:     EPINEPHrine (EPIPEN) 0.3 MG/0.3ML solution auto-injector injection, 0.3 mL. (Patient not taking: Reported on 2024), Disp: , Rfl:      Allergies   Allergen Reactions    Effexor [Venlafaxine] Anaphylaxis    Wasp Venom Anaphylaxis    Latex Hives and Rash    Penicillins Rash    Tetanus Toxoids Hives and Swelling       Social History     Tobacco Use    Smoking status: Former     Current packs/day: 0.00     Average packs/day: 1 pack/day for 18.0 years (18.0 ttl pk-yrs)     Types: Cigarettes     Start date: 1963     Quit date: 1981     Years since quittin.2     Passive exposure: Past    Smokeless tobacco: Never   Vaping Use     "Vaping status: Never Used   Substance Use Topics    Alcohol use: Yes     Alcohol/week: 1.0 standard drink of alcohol     Types: 1 Shots of liquor per week     Comment: daily-STOPPED WHEN COVID STARTED     Drug use: Never        Objective     Vital Signs:   Vitals:    08/29/24 1023 08/29/24 1030   Temp:  97.6 °F (36.4 °C)   TempSrc:  Temporal   Weight:  84.5 kg (186 lb 3.2 oz)   Height: 160 cm (63\") 160 cm (63\")       Tobacco Use: Medium Risk (8/29/2024)    Patient History     Smoking Tobacco Use: Former     Smokeless Tobacco Use: Never     Passive Exposure: Past         Physical Exam    Constitutional   Appearance  well developed, well-nourished, alert and in no acute distress, voice clear and strong    Head   Inspection  no deformities or lesions      Face   Inspection  no facial lesions; House-Brackmann I/VI bilaterally   Palpation  no TMJ crepitus nor  muscle tenderness bilaterally     Eyes/Vision   Visual Fields  extraocular movements are intact, no spontaneous or gaze-induced nystagmus  Conjunctivae  clear   Sclerae  clear   Pupils and Irises  pupils equal, round, and reactive to light.   Nystagmus  not present     Ears, Nose, Mouth and Throat  Ears  External Ears  appearance within normal limits, no lesions present   Otoscopic Examination  tympanic membrane appearance within normal limits bilaterally without perforations, well-aerated middle ears   Hearing  intact to conversational voice both ears   Tunning fork testing    Rinne:  Burgos:    Nose  External Nose  appearance normal   Intranasal Exam  mucosa within normal limits, vestibules normal, no intranasal lesions present, septum midline, sinuses non tender to percussion   Modified Mc Test:    Oral Cavity  Oral Mucosa  oral mucosa normal without pallor or cyanosis   Lips  lip appearance normal   Teeth  normal dentition for age   Gums  gums pink, non-swollen, no bleeding present   Tongue  tongue appearance normal; normal mobility   Palate  hard " palate normal, soft palate appearance normal with symmetric mobility     Throat  Oropharynx  no inflammation or lesions present, tonsils within normal limits   Hypopharynx  appearance within normal limits   Larynx  voice normal     Neck  Inspection/Palpation  normal appearance, no masses or tenderness, trachea midline; thyroid size normal, nontender, no nodules or masses present on palpation     Lymphatic  Neck  no lymphadenopathy present   Supraclavicular Nodes  no lymphadenopathy present   Preauricular Nodes  no lymphadenopathy present     Respiratory  Respiratory Effort  breathing unlabored   Inspection of Chest  normal appearance, no retractions     Musculoskeletal   Cervical back: Normal range of motion and neck supple.      Skin and Subcutaneous Tissue  General Inspection  Regarding face and neck - there are no rashes present, no lesions present, and no areas of discoloration     Neurologic  Cranial Nerves  cranial nerves II-XII are grossly intact bilaterally   Gait and Station  normal gait, able to stand without diffculty    Psychiatric  Judgement and Insight  judgment and insight intact   Mood and Affect  mood normal, affect appropriate       RESULTS REVIEWED    I have reviewed the following information:   [x]  Previous Internal Note  []  Previous External Note:   [x]  Ordered Tests & Results:      Pathology:   Lab Results   Component Value Date    CASEREPORT  06/18/2024     Surgical Pathology Report                         Case: [id number]                                  Authorizing Provider:  Marcio Kang MD    Collected:           06/18/2024 10:12 AM          Ordering Location:     [facility] Received:            06/18/2024 10:56 AM                                 SUITES                                                                       Pathologist:           Sarah Lugo DO                                                       Specimen:    Esophagus, Distal, DISTAL  "ESOPHAGUS BIOPSIES                                               CLININFO  06/18/2024     Gastroesophageal reflux disease, unspecified whether esophagitis present      FINALDX  06/18/2024     Distal esophagus, biopsy:   - Squamous mucosa with increased intraepithelial eosinophils (up to 30 per high-power field), see comment    - Negative for intestinal metaplasia, dysplasia and malignancy        GROSSDES  06/18/2024     1. Esophagus, Distal.  Received in formalin and labeled \" distal esophagus\" is a 0.7 cm aggregate of tan soft tissue fragments. The specimen is entirely submitted in one cassette.   JACQUELINE      MICRO  06/18/2024     Microscopic examination performed.         TSH   Date Value Ref Range Status   08/14/2024 2.920 0.270 - 4.200 uIU/mL Final     T3, Free   Date Value Ref Range Status   02/15/2023 2.60 2.00 - 4.40 pg/mL Final     Free T4   Date Value Ref Range Status   08/14/2024 1.05 0.92 - 1.68 ng/dL Final     PTH, Intact   Date Value Ref Range Status   06/06/2022 19.4 15.0 - 65.0 pg/mL Final     Calcium   Date Value Ref Range Status   06/06/2022 10.2 8.6 - 10.5 mg/dL Final   06/26/2021 9.4 8.9 - 10.2 mg/dL Final       No Images in the past 120 days found..    I have discussed the interpretation of the above results with the patient.    Procedures          Assessment and Plan   Diagnoses and all orders for this visit:    1. Hypothyroidism (acquired) (Primary)  -     levothyroxine (SYNTHROID, LEVOTHROID) 88 MCG tablet; Take 1 tablet by mouth Daily. 75MCG  Dispense: 90 tablet; Refill: 2  -     TSH; Future  -     T4, free; Future  -     T3, Free; Future  -     TSH; Future  -     T4, free; Future  -     T3, Free; Future    2. Sjogren's syndrome, with unspecified organ involvement    3. Status post partial thyroidectomy        (E03.9) Hypothyroidism (acquired) - Plan: levothyroxine (SYNTHROID, LEVOTHROID) 88 MCG tablet, TSH, T4, free, T3, Free, TSH, T4, free, T3, Free    (M35.00) Sjogren's syndrome, with " unspecified organ involvement    (E89.0) Status post partial thyroidectomy     Dk Zuniga  reports that she quit smoking about 43 years ago. Her smoking use included cigarettes. She started smoking about 61 years ago. She has a 18 pack-year smoking history. She has been exposed to tobacco smoke. She has never used smokeless tobacco.     Plan:  Patient Instructions   1.  After reviewing the labs, I went ahead and increased her back to levothyroxine at 88 mcg p.o. daily.  And she will also take it in the mornings and empty stomach.  2.  I am going to repeat the blood work in couple months and see where she is at and we will determine further as what to do.  In the meantime we will set her up for a follow-up appointment with blood work also in 6 months.  3.  Patient has Sjögren's syndrome and had been on Restasis but apparently recent visit with Dr. Doyle felt like that was much better and he is going to most likely proceed with cataract surgery.    Follow Up   Return in about 6 months (around 2/28/2025), or Follow-up 6 months with thyroid labs.  Patient to get another set of labs in 1-2 months.  Patient was given instructions and counseling regarding her condition or for health maintenance advice. Please see specific information pulled into the AVS if appropriate.

## 2024-08-29 NOTE — PATIENT INSTRUCTIONS
1.  After reviewing the labs, I went ahead and increased her back to levothyroxine at 88 mcg p.o. daily.  And she will also take it in the mornings and empty stomach.  2.  I am going to repeat the blood work in couple months and see where she is at and we will determine further as what to do.  In the meantime we will set her up for a follow-up appointment with blood work also in 6 months.  3.  Patient has Sjögren's syndrome and had been on Restasis but apparently recent visit with Dr. Doyle felt like that was much better and he is going to most likely proceed with cataract surgery.

## 2024-09-20 ENCOUNTER — LAB (OUTPATIENT)
Dept: LAB | Facility: HOSPITAL | Age: 74
End: 2024-09-20
Payer: MEDICARE

## 2024-09-20 DIAGNOSIS — E03.9 HYPOTHYROIDISM (ACQUIRED): ICD-10-CM

## 2024-09-20 LAB
T3FREE SERPL-MCNC: 3.12 PG/ML (ref 2–4.4)
T4 FREE SERPL-MCNC: 1.16 NG/DL (ref 0.92–1.68)
TSH SERPL DL<=0.05 MIU/L-ACNC: 1.11 UIU/ML (ref 0.27–4.2)

## 2024-09-20 PROCEDURE — 84443 ASSAY THYROID STIM HORMONE: CPT

## 2024-09-20 PROCEDURE — 84439 ASSAY OF FREE THYROXINE: CPT

## 2024-09-20 PROCEDURE — 84481 FREE ASSAY (FT-3): CPT

## 2024-09-20 PROCEDURE — 36415 COLL VENOUS BLD VENIPUNCTURE: CPT

## 2024-09-25 DIAGNOSIS — E03.9 HYPOTHYROIDISM (ACQUIRED): ICD-10-CM

## 2024-09-25 RX ORDER — LEVOTHYROXINE SODIUM 88 UG/1
88 TABLET ORAL DAILY
Qty: 90 TABLET | Refills: 3 | Status: SHIPPED | OUTPATIENT
Start: 2024-09-25

## 2024-09-30 ENCOUNTER — OFFICE VISIT (OUTPATIENT)
Dept: SLEEP MEDICINE | Facility: HOSPITAL | Age: 74
End: 2024-09-30
Payer: MEDICARE

## 2024-09-30 VITALS
WEIGHT: 190 LBS | BODY MASS INDEX: 32.44 KG/M2 | OXYGEN SATURATION: 100 % | SYSTOLIC BLOOD PRESSURE: 144 MMHG | HEART RATE: 88 BPM | DIASTOLIC BLOOD PRESSURE: 64 MMHG | HEIGHT: 64 IN

## 2024-09-30 DIAGNOSIS — J45.909 ASTHMA, UNSPECIFIED ASTHMA SEVERITY, UNSPECIFIED WHETHER COMPLICATED, UNSPECIFIED WHETHER PERSISTENT: ICD-10-CM

## 2024-09-30 DIAGNOSIS — G47.33 OSA ON CPAP: Primary | ICD-10-CM

## 2024-09-30 DIAGNOSIS — I10 ESSENTIAL HYPERTENSION: ICD-10-CM

## 2024-09-30 DIAGNOSIS — E66.811 CLASS 1 OBESITY: ICD-10-CM

## 2024-09-30 PROCEDURE — 3078F DIAST BP <80 MM HG: CPT | Performed by: INTERNAL MEDICINE

## 2024-09-30 PROCEDURE — 1160F RVW MEDS BY RX/DR IN RCRD: CPT | Performed by: INTERNAL MEDICINE

## 2024-09-30 PROCEDURE — 99214 OFFICE O/P EST MOD 30 MIN: CPT | Performed by: INTERNAL MEDICINE

## 2024-09-30 PROCEDURE — G0463 HOSPITAL OUTPT CLINIC VISIT: HCPCS

## 2024-09-30 PROCEDURE — 3077F SYST BP >= 140 MM HG: CPT | Performed by: INTERNAL MEDICINE

## 2024-09-30 PROCEDURE — 1159F MED LIST DOCD IN RCRD: CPT | Performed by: INTERNAL MEDICINE

## 2024-09-30 RX ORDER — BUDESONIDE AND FORMOTEROL FUMARATE DIHYDRATE 80; 4.5 UG/1; UG/1
2 AEROSOL RESPIRATORY (INHALATION) 2 TIMES DAILY
Qty: 1 EACH | Refills: 1 | Status: SHIPPED | OUTPATIENT
Start: 2024-09-30

## 2024-09-30 NOTE — PROGRESS NOTES
"  [facility]  Sleep medicine  [address] [place] [id number]  Phone: [phone]  Fax: [phone]      SLEEP CLINIC FOLLOW UP PROGRESS NOTE.    Dk Zuniga  [id number]   1950  74 y.o.  female      PCP: Haritha Iraheta MD      Date of visit: 9/30/2024    Chief Complaint   Patient presents with    Sleep Apnea    Obesity    Follow-up       HPI:  This is a 74 y.o. years old patient is here for the management of obstructive sleep apnea.  Sleep apnea is moderate in severity with a AHI of 19/hr. Patient is using positive airway pressure therapy with CPAP 13 and the symptoms of sleep apnea have improved significantly on the therapy. Normally patient goes to bed at 10 PM and wakes up at 6 AM .  The patient wakes up 1 time(s) during the night and has no problem going back to sleep.  Feels refreshed after waking up.     2 weeks ago she had COVID and since then she is having bronchospasm and wheezing.  Does not have any cough or sputum production no fever    Medications and allergies are reviewed by me and documented in the encounter.     SOCIAL (habits pertaining to sleep medicine)  History tobacco use:No   History of alcohol use: 3 per week  Caffeine use: 2     REVIEW OF SYSTEMS:   Pertaining positive symptoms are:  [place] Sleepiness Scale :Total score: 4   Nasal congestion      PHYSICAL EXAMINATION:  CONSTITUTIONAL:  Vitals:    09/30/24 1100   BP: 144/64   BP Location: Left arm   Patient Position: Sitting   Pulse: 88   SpO2: 100%   Weight: 86.2 kg (190 lb)   Height: 162.6 cm (64.02\")    Body mass index is 32.6 kg/m².   NOSE: nasal passages are clear, No deformities noted   RESP SYSTEM: Not in any respiratory distress, no chest deformities noted,   CARDIOVASULAR: No edema noted  NEURO: Oriented x 3, gait normal,  Mood and affect appeared appropriate      Data reviewed:  The Smart card downloaded on 9/30/2024 has been reviewed independently by me for compliance and discussed the " data with the patient.   Compliance; 100%  More than 4 hr use, 100%  Average use of the device 7 hours and 56-minute per night  Residual AHI: 1.4 /hr (goal < 5.0 /hr)  Mask type: Nasal pillows  Device: ResMed  DME: Patient aids in [place]      ASSESSMENT AND PLAN:  Obstructive sleep apnea ( G 47.33).  The symptoms of sleep apnea have improved with the device and the treatment.  Patient's compliance with the device is excellent for treatment of sleep apnea.  I have independently reviewed the smart card down load and discussed with the patient the download data and encouarged the patient to continue to use the device.The residual AHI is acceptable. The device is benefiting the patient and the device is medically necessary.  Without proper control of sleep apnea and good compliance there is a increased risk for hypertension, diabetes mellitus and nonrestorative sleep with hypersomnia which can increase risk for motor vehicle accidents.  Untreated sleep apnea is also a risk factor for development of atrial fibrillation, pulmonary hypertension, insulin resistance and stroke. The patient is also instructed to get the supplies from the [facility] and and change them on a regular basis.  A prescription for supplies has been sent to the [facility].  I have also discussed the good sleep hygiene habits and adequate amount of sleep needed for good health.  Obesity  1 with BMI is Body mass index is 32.6 kg/m².. I have discuss the relationship between the weight and sleep apnea. The benefit of weight loss in reducing severity of sleep apnea was discussed. Discussed diet and exercise with the patient to achieve ideal BMI.   Reactive airway disease secondary to COVID..  She would benefit from short-term bronchodilator.  I will send a prescription for Symbicort 2 puffs twice a day 80 mcg.  I also instructed her to rinse her mouth and if she does not get better I have asked her to call her pulmonary to get an earlier  appointment as she has an appointment next April  Return in about 1 year (around 9/30/2025) for with smart card down load. . Patient's questions were answered.    9/30/2024  Lex Palma MD  Sleep Medicine.  Medical Director,   [facility] [place] [facility] [place] [facility].

## 2025-03-11 ENCOUNTER — LAB (OUTPATIENT)
Dept: LAB | Facility: HOSPITAL | Age: 75
End: 2025-03-11
Payer: MEDICARE

## 2025-03-11 DIAGNOSIS — E03.9 HYPOTHYROIDISM (ACQUIRED): ICD-10-CM

## 2025-03-11 LAB
T3FREE SERPL-MCNC: 1.96 PG/ML (ref 2–4.4)
T4 FREE SERPL-MCNC: 1.22 NG/DL (ref 0.92–1.68)
TSH SERPL DL<=0.05 MIU/L-ACNC: 1.42 UIU/ML (ref 0.27–4.2)

## 2025-03-11 PROCEDURE — 84439 ASSAY OF FREE THYROXINE: CPT

## 2025-03-11 PROCEDURE — 84481 FREE ASSAY (FT-3): CPT

## 2025-03-11 PROCEDURE — 84443 ASSAY THYROID STIM HORMONE: CPT

## 2025-03-11 PROCEDURE — 36415 COLL VENOUS BLD VENIPUNCTURE: CPT

## 2025-03-21 ENCOUNTER — OFFICE VISIT (OUTPATIENT)
Dept: OTOLARYNGOLOGY | Facility: CLINIC | Age: 75
End: 2025-03-21
Payer: MEDICARE

## 2025-03-21 VITALS
TEMPERATURE: 97.5 F | DIASTOLIC BLOOD PRESSURE: 84 MMHG | WEIGHT: 187.4 LBS | BODY MASS INDEX: 31.99 KG/M2 | SYSTOLIC BLOOD PRESSURE: 134 MMHG | HEIGHT: 64 IN | HEART RATE: 80 BPM

## 2025-03-21 DIAGNOSIS — E55.9 VITAMIN D DEFICIENCY: ICD-10-CM

## 2025-03-21 DIAGNOSIS — E03.9 HYPOTHYROIDISM (ACQUIRED): Primary | ICD-10-CM

## 2025-03-21 DIAGNOSIS — E89.0 STATUS POST PARTIAL THYROIDECTOMY: ICD-10-CM

## 2025-03-21 DIAGNOSIS — M35.00 SJOGREN'S SYNDROME, WITH UNSPECIFIED ORGAN INVOLVEMENT: ICD-10-CM

## 2025-03-21 DIAGNOSIS — R53.83 FATIGUE, UNSPECIFIED TYPE: ICD-10-CM

## 2025-03-21 RX ORDER — DOXYCYCLINE 100 MG/1
1 CAPSULE ORAL EVERY 12 HOURS SCHEDULED
COMMUNITY
Start: 2025-02-25

## 2025-03-21 RX ORDER — LIOTHYRONINE SODIUM 5 UG/1
5 TABLET ORAL DAILY
Qty: 30 TABLET | Refills: 3 | Status: SHIPPED | OUTPATIENT
Start: 2025-03-21

## 2025-03-21 RX ORDER — LEVOTHYROXINE SODIUM 88 UG/1
88 TABLET ORAL DAILY
Qty: 90 TABLET | Refills: 3 | Status: SHIPPED | OUTPATIENT
Start: 2025-03-21

## 2025-03-21 NOTE — PROGRESS NOTES
"Patient Name: Dk Zuniga   Visit Date: 03/21/2025   Patient ID: [id number]  Provider: TY Walker    Sex: female  Location: [facility]   YOB: 1950  Location Address: [address] [place] [address], [address],?[place]?[id number]    Primary Care Provider Haritha Iraheta MD  Location Phone: [phone]    Referring Provider: No ref. provider found        Chief Complaint  6 M F/UP WITH LABS    History of Present Illness  Dk Zuniga is a 75 y.o. female who presents to [facility] for 6 M F/UP WITH LABS  Ms. Zuniga is an established patient of Dr. Friedman last seen on 8/29/2024.  According to Dr. Friedman's previous note patient has the following history: \"Patient has history of right thyroidectomy on 5/18/2022 for benign disease.  Patient also has been diagnosed with Sjogren's disease and on Restasis.  Patient also has obstructive sleep apnea which she is on CPAP.  Currently patient's hypothyroidism is supplemented with levothyroxine 88 mcg p.o. daily.  Patient was last seen by Dr. Friedman at [facility] the ENT clinic on 8/7/2023.  Her labs on 8/2/2023 show free T4 of 1.38 and TSH of 1.04.  Patient is here for 1 year follow-up with thyroid function test lab.  Patient reports that in December 2023 or January of this year, family physician when I had not obtained the thyroid lab at elsewhere and told her that it was thyroid toxic and decreased her dose to 75 mcg p.o. daily.  Since then patient has been tired and sleeping a lot.\"  At last visit on 8/29/2024, patient was increased from 75 mcg to 88 mcg daily and instructed to take her Synthroid on an empty stomach first thing in the morning 30 minutes prior to any other medications.  Patient has also been on Restasis for her Sjogren syndrome and is followed by Dr. Doyle who is going to possibly proceed with cataract surgery on her.    Patient's lab results from 3/11/2025 are as follows:  TSH of 1.42, free T4 of " "1.22, free T3 1.96.  Patient presents today for 6-month follow-up with lab review.  Patient has been increasingly sleepy and fatigue lately.   Also has had a lot of life stressors lately with her husbands passing.   Patient going to see cardiology for an eval soon.     Vital Signs:  Vitals:    03/21/25 1114   BP: 134/84   Pulse: 80   Temp: 97.5 °F (36.4 °C)   TempSrc: Temporal   Weight: 85 kg (187 lb 6.4 oz)   Height: 162.6 cm (64.02\")        Past Medical History:   Diagnosis Date    Asthma     Atypical chest pain     STRESS TEST 6/21/2021 CLEARED BY DR. PANG 6/22/21 PER KATI     BPPV (benign paroxysmal positional vertigo), right     Dyslipidemia     Dysphasia     Fissure in skin     BREAST - HAD IMPLANTS REMOVED    GERD (gastroesophageal reflux disease)     Hyperlipidemia     Hypertension     Obstructive sleep apnea     Compliant with CPAP    Pneumothorax     X3 \"AS A YOUNG GIRL\"    PONV (postoperative nausea and vomiting)     Sjogren's syndrome     Thyroid nodule     Vertigo        Past Surgical History:   Procedure Laterality Date    APPENDECTOMY      WITH HYST    BREAST IMPLANT SURGERY Bilateral     REMOVED     CHOLECYSTECTOMY      LAP    COLONOSCOPY      COLONOSCOPY N/A 06/23/2021    Procedure: COLONOSCOPY;  Surgeon: Marcio Kang MD;  Location: [facility] ENDOSCOPY;  Service: Gastroenterology;  Laterality: N/A;  NORMAL COLON    ENDOSCOPY N/A 06/23/2021    Procedure: ESOPHAGOGASTRODUODENOSCOPY WITH BIOPSIES;  Surgeon: Marcio Kang MD;  Location: [facility] ENDOSCOPY;  Service: Gastroenterology;  Laterality: N/A;  REFLUX ESOPHAGITIS    ENDOSCOPY N/A 6/18/2024    Procedure: ESOPHAGOGASTRODUODENOSCOPY WITH BIOPSIES;  Surgeon: Marcio Kang MD;  Location: [facility] ENDOSCOPY;  Service: Gastroenterology;  Laterality: N/A;  ESOPHAGITIS, HIATAL HERNIA    HYSTERECTOMY      PLEURAL SCARIFICATION Right     THYROIDECTOMY Right 5/18/2022    Procedure: RIGHT THYROIDECTOMY WITH FROZEN SECTION, POSSIBLE TOTAL, " RECURRENT LARYNGEAL NERVE MONITORING;  Surgeon: Mauricio Friedman MD;  Location: [facility] MAIN OR;  Service: ENT;  Laterality: Right;    TONSILLECTOMY           Current Outpatient Medications:     albuterol sulfate  (90 Base) MCG/ACT inhaler, Inhale 1 puff Every 6 (Six) Hours As Needed., Disp: , Rfl:     ALLERGY SERUM INJECTION, Inject  under the skin into the appropriate area as directed 1 (One) Time. Weekly, Disp: , Rfl:     atorvastatin (LIPITOR) 20 MG tablet, Take 1 tablet by mouth Every Night., Disp: , Rfl:     azelastine (ASTELIN) 0.1 % nasal spray, Administer 1-2 sprays into the nostril(s) as directed by provider 2 (Two) Times a Day., Disp: , Rfl:     B Complex Vitamins (B COMPLEX 1 PO), Take  by mouth., Disp: , Rfl:     budesonide-formoterol (Symbicort) 80-4.5 MCG/ACT inhaler, Inhale 2 puffs 2 (Two) Times a Day., Disp: 1 each, Rfl: 1    coenzyme Q10 100 MG capsule, Take 1 capsule by mouth Every Night., Disp: , Rfl:     cycloSPORINE (RESTASIS) 0.05 % ophthalmic emulsion, 1 drop 2 (Two) Times a Day., Disp: , Rfl:     fluticasone (FLONASE) 50 MCG/ACT nasal spray, 2 sprays by Each Nare route Daily., Disp: , Rfl:     hydrochlorothiazide (MICROZIDE) 12.5 MG capsule, Take 1 capsule by mouth Every Morning., Disp: , Rfl:     hydroCHLOROthiazide 12.5 MG tablet, Take 1 tablet by mouth Every Morning., Disp: , Rfl:     levothyroxine (SYNTHROID, LEVOTHROID) 88 MCG tablet, Take 1 tablet by mouth Daily. 75MCG, Disp: 90 tablet, Rfl: 3    Magnesium 500 MG tablet, Take  by mouth 3 (Three) Times a Week., Disp: , Rfl:     olmesartan (BENICAR) 20 MG tablet, Take 1 tablet by mouth 2 (Two) Times a Day., Disp: , Rfl:     Turmeric (QC TUMERIC COMPLEX PO), Take 1 capsule by mouth Daily., Disp: , Rfl:     vitamin D (ERGOCALCIFEROL) 1.25 MG (75506 UT) capsule capsule, Take 1 capsule by mouth 1 (One) Time Per Week., Disp: , Rfl:     5-Hydroxytryptophan (5-HTP PO), Take  by mouth. (Patient not taking: Reported on 3/21/2025), Disp: ,  Rfl:     aspirin 81 MG EC tablet, Daily. (Patient not taking: Reported on 3/21/2025), Disp: , Rfl:     azelaic acid (AZELEX) 15 % gel, APPLY TO FACE AT BEDTIME FOR ROSACEA (Patient not taking: Reported on 3/21/2025), Disp: , Rfl:     BIOTIN PO, Take  by mouth. (Patient not taking: Reported on 3/21/2025), Disp: , Rfl:     diltiaZEM (CARDIZEM) 30 MG tablet, Take 1 tablet by mouth As Needed (ESOPHAGEAL SPASM)., Disp: , Rfl:     doxycycline (VIBRAMYCIN) 100 MG capsule, Take 1 capsule by mouth Every 12 (Twelve) Hours. (Patient not taking: Reported on 3/21/2025), Disp: , Rfl:     EPINEPHrine (EPIPEN) 0.3 MG/0.3ML solution auto-injector injection, 0.3 mL. (Patient not taking: Reported on 3/21/2025), Disp: , Rfl:     Folic Acid (FOLATE PO), Take  by mouth. (Patient not taking: Reported on 3/21/2025), Disp: , Rfl:     L-THEANINE PO, Take  by mouth. (Patient not taking: Reported on 3/21/2025), Disp: , Rfl:     levOCARNitine (L-CARNITINE PO), Take  by mouth. (Patient not taking: Reported on 3/21/2025), Disp: , Rfl:     liothyronine (Cytomel) 5 MCG tablet, Take 1 tablet by mouth Daily., Disp: 30 tablet, Rfl: 3    pantoprazole (PROTONIX) 40 MG EC tablet, Take 1 tablet by mouth Daily. (Patient not taking: Reported on 3/21/2025), Disp: 30 tablet, Rfl: 5     Allergies   Allergen Reactions    Effexor [Venlafaxine] Anaphylaxis    Wasp Venom Anaphylaxis    Latex Hives and Rash    Penicillins Rash    Tetanus Toxoids Hives and Swelling       Social History     Tobacco Use    Smoking status: Former     Current packs/day: 0.00     Average packs/day: 1 pack/day for 18.0 years (18.0 ttl pk-yrs)     Types: Cigarettes     Start date: 1963     Quit date: 1981     Years since quittin.7     Passive exposure: Past    Smokeless tobacco: Never   Vaping Use    Vaping status: Never Used   Substance Use Topics    Alcohol use: Yes     Alcohol/week: 1.0 standard drink of alcohol     Types: 1 Shots of liquor per week     Comment:  daily-STOPPED WHEN COVID STARTED     Drug use: Never        Objective     Tobacco Use: Medium Risk (3/21/2025)    Patient History     Smoking Tobacco Use: Former     Smokeless Tobacco Use: Never     Passive Exposure: Past         Physical Exam    Constitutional   Appearance  well developed, well-nourished, alert and in no acute distress, voice clear and strong    Head   Inspection  no deformities or lesions, atraumatic    Face   Inspection  no facial lesions; House-Brackmann I/VI bilaterally   Palpation  no TMJ crepitus nor  muscle tenderness bilaterally     Eyes/Vision   Visual Fields  extraocular movements are intact, no spontaneous or gaze-induced nystagmus  Conjunctivae  clear   Sclerae  clear   Pupils and Irises  pupils equal, round, and reactive to light.   Nystagmus  not present     Ears, Nose, Mouth and Throat  Ears  External Ears  Auricles appearance within normal limits, no lesions present   Otoscopic Examination  tympanic membrane appearance within normal limits bilaterally without perforations, well-aerated middle ears without evidence of effusion  Hearing  intact to conversational voice both ears   Tunning fork testing    Rinne:  [place]:    Nose  External Nose  appearance normal   Intranasal Exam  mucosa within normal limits, vestibules normal, no intranasal lesions present, septum midline, sinuses non tender to percussion   Modified [place] Test:    Oral Cavity  Oral Mucosa  oral mucosa normal without pallor or cyanosis   Stensen's and Warthin's ducts are productive and patent with clear saliva  Lips  lip appearance normal   Teeth  normal dentition for age   Gums  gums pink, non-swollen, no bleeding present   Tongue  tongue appearance normal; normal mobility   Palate  hard palate normal, soft palate appearance normal with symmetric mobility     Throat  Oropharynx  no inflammation or lesions present  Tonsils  Bilateral tonsils unremarkable  Hypopharynx  appearance within normal limits    Larynx  voice normal     Neck  Inspection/Palpation  normal appearance, no masses or tenderness, trachea midline; thyroid size normal, nontender, no nodules or masses present on palpation     Lymphatic  Neck  no lymphadenopathy present   Supraclavicular Nodes  no lymphadenopathy present   Preauricular Nodes  no lymphadenopathy present     Respiratory  Respiratory Effort  breathing unlabored   Inspection of Chest  normal appearance, no retractions     Musculoskeletal   Cervical back: Normal range of motion and neck supple.      Skin and Subcutaneous Tissue  General Inspection  Regarding face and neck - there are no rashes present, no lesions present, and no areas of discoloration     Neurologic  Cranial Nerves  Alert and oriented x3  cranial nerves II-XII are grossly intact bilaterally   Gait and Station  normal gait, able to stand without diffculty    Psychiatric  Judgement and Insight  judgment and insight intact   Mood and Affect  mood normal, affect appropriate       RESULTS REVIEWED    I have reviewed the following information:   [x]  Previous Internal Note  []  Previous External Note:   [x]  Ordered Tests & Results:      Pathology:   Lab Results   Component Value Date    Microscopic Description  06/18/2024     Microscopic examination performed.         TSH   Date Value Ref Range Status   03/11/2025 1.420 0.270 - 4.200 uIU/mL Final     T3, Free   Date Value Ref Range Status   03/11/2025 1.96 (L) 2.00 - 4.40 pg/mL Final     Free T4   Date Value Ref Range Status   03/11/2025 1.22 0.92 - 1.68 ng/dL Final     PTH, Intact   Date Value Ref Range Status   06/06/2022 19.4 15.0 - 65.0 pg/mL Final     Calcium   Date Value Ref Range Status   06/06/2022 10.2 8.6 - 10.5 mg/dL Final   06/26/2021 9.4 8.9 - 10.2 mg/dL Final       No Images in the past 120 days found..    I have discussed the interpretation of the above results with the patient.    Procedures          Assessment and Plan   Diagnoses and all orders for this  visit:    1. Hypothyroidism (acquired) (Primary)  -     liothyronine (Cytomel) 5 MCG tablet; Take 1 tablet by mouth Daily.  Dispense: 30 tablet; Refill: 3  -     levothyroxine (SYNTHROID, LEVOTHROID) 88 MCG tablet; Take 1 tablet by mouth Daily. 75MCG  Dispense: 90 tablet; Refill: 3  -     TSH; Future  -     T4, free; Future  -     T3, Free; Future  -     Vitamin D,25-Hydroxy; Future  -     Vitamin B12; Future  -     Folate; Future    2. Sjogren's syndrome, with unspecified organ involvement    3. Status post partial thyroidectomy    4. Fatigue, unspecified type  -     liothyronine (Cytomel) 5 MCG tablet; Take 1 tablet by mouth Daily.  Dispense: 30 tablet; Refill: 3  -     levothyroxine (SYNTHROID, LEVOTHROID) 88 MCG tablet; Take 1 tablet by mouth Daily. 75MCG  Dispense: 90 tablet; Refill: 3  -     TSH; Future  -     T4, free; Future  -     T3, Free; Future  -     Vitamin D,25-Hydroxy; Future  -     Vitamin B12; Future  -     Folate; Future    5. Vitamin D deficiency  -     Vitamin D,25-Hydroxy; Future        (E03.9) Hypothyroidism (acquired) - Plan: liothyronine (Cytomel) 5 MCG tablet, levothyroxine (SYNTHROID, LEVOTHROID) 88 MCG tablet, TSH, T4, free, T3, Free, Vitamin D,25-Hydroxy, Vitamin B12, Folate    (M35.00) Sjogren's syndrome, with unspecified organ involvement    (E89.0) Status post partial thyroidectomy    (R53.83) Fatigue, unspecified type - Plan: liothyronine (Cytomel) 5 MCG tablet, levothyroxine (SYNTHROID, LEVOTHROID) 88 MCG tablet, TSH, T4, free, T3, Free, Vitamin D,25-Hydroxy, Vitamin B12, Folate    (E55.9) Vitamin D deficiency - Plan: Vitamin D,25-Hydroxy     Dk Zuniga  reports that she quit smoking about 43 years ago. Her smoking use included cigarettes. She started smoking about 61 years ago. She has a 18 pack-year smoking history. She has been exposed to tobacco smoke. She has never used smokeless tobacco.       Plan:  Patient Instructions   1.  Patient's most recent lab results reveal a normal  TSH and normal T4 but patient's T3 is mildly decreased at 1.96.  I discussed options with patient as far as increasing Synthroid, but I do not have much wiggle room as far as the T4 goes.  Other options would be to place patient on liothyronine, or continued observation and getting labs again in a few months.  Patient has had significant fatigue lately and would like to attempt the liothyronine, therefore I placed patient on liothyronine 5 mcg daily and patient is to continue Synthroid at 88 mcg daily.    2.  I have ordered labs for 1 month, and have added on a vitamin D B12 and folate due to patient's fatigue and history of vitamin D deficiency but states that she has not had a lab done in a while.  3.  I will see patient back in 5 weeks to review labs.  Patient was instructed on symptoms of hyperthyroidism to inform me if she is having.    Follow Up   Return in about 5 weeks (around 4/25/2025), or with labs with me.  Patient was given instructions and counseling regarding her condition or for health maintenance advice. Please see specific information pulled into the AVS if appropriate.      All or a portion of this Note was dictated utilizing Dragon Dictation.

## 2025-03-21 NOTE — PATIENT INSTRUCTIONS
1.  Patient's most recent lab results reveal a normal TSH and normal T4 but patient's T3 is mildly decreased at 1.96.  I discussed options with patient as far as increasing Synthroid, but I do not have much wiggle room as far as the T4 goes.  Other options would be to place patient on liothyronine, or continued observation and getting labs again in a few months.  Patient has had significant fatigue lately and would like to attempt the liothyronine, therefore I placed patient on liothyronine 5 mcg daily and patient is to continue Synthroid at 88 mcg daily.    2.  I have ordered labs for 1 month, and have added on a vitamin D B12 and folate due to patient's fatigue and history of vitamin D deficiency but states that she has not had a lab done in a while.  3.  I will see patient back in 5 weeks to review labs.  Patient was instructed on symptoms of hyperthyroidism to inform me if she is having.

## 2025-04-17 ENCOUNTER — LAB (OUTPATIENT)
Dept: LAB | Facility: HOSPITAL | Age: 75
End: 2025-04-17
Payer: MEDICARE

## 2025-04-17 DIAGNOSIS — E03.9 HYPOTHYROIDISM (ACQUIRED): ICD-10-CM

## 2025-04-17 DIAGNOSIS — R53.83 FATIGUE, UNSPECIFIED TYPE: ICD-10-CM

## 2025-04-17 DIAGNOSIS — E55.9 VITAMIN D DEFICIENCY: ICD-10-CM

## 2025-04-17 LAB
25(OH)D3 SERPL-MCNC: 45.2 NG/ML (ref 30–100)
FOLATE SERPL-MCNC: >20 NG/ML (ref 4.78–24.2)
T3FREE SERPL-MCNC: 3.57 PG/ML (ref 2–4.4)
T4 FREE SERPL-MCNC: 1.21 NG/DL (ref 0.92–1.68)
TSH SERPL DL<=0.05 MIU/L-ACNC: 0.39 UIU/ML (ref 0.27–4.2)
VIT B12 BLD-MCNC: 693 PG/ML (ref 211–946)

## 2025-04-17 PROCEDURE — 84443 ASSAY THYROID STIM HORMONE: CPT

## 2025-04-17 PROCEDURE — 84481 FREE ASSAY (FT-3): CPT

## 2025-04-17 PROCEDURE — 82306 VITAMIN D 25 HYDROXY: CPT

## 2025-04-17 PROCEDURE — 84439 ASSAY OF FREE THYROXINE: CPT

## 2025-04-17 PROCEDURE — 36415 COLL VENOUS BLD VENIPUNCTURE: CPT

## 2025-04-17 PROCEDURE — 82607 VITAMIN B-12: CPT

## 2025-04-17 PROCEDURE — 82746 ASSAY OF FOLIC ACID SERUM: CPT

## 2025-04-23 NOTE — PROGRESS NOTES
"Patient Name: Dk Zuniga   Visit Date: 04/25/2025   Patient ID: [id number]  Provider: TY Walker    Sex: female  Location: [facility]   YOB: 1950  Location Address: [address] [place] [address], [address],?[place]?[address]    Primary Care Provider Haritha Iraheta MD  Location Phone: [phone]    Referring Provider: No ref. provider found        Chief Complaint  5 week follow up w/lab results    History of Present Illness  Dk Zuniga is a 75 y.o. female who presents to [facility] for 5 week follow up w/lab results  Ms. Zuniga is an established patient of Dr. Friedman last seen on 8/29/2024.  According to Dr. Friedman's previous note patient has the following history: \"Patient has history of right thyroidectomy on 5/18/2022 for benign disease.  Patient also has been diagnosed with Sjogren's disease and on Restasis.  Patient also has obstructive sleep apnea which she is on CPAP.  Currently patient's hypothyroidism is supplemented with levothyroxine 88 mcg p.o. daily.  Patient was last seen by Dr. Friedman at [facility] the ENT clinic on 8/7/2023.  Her labs on 8/2/2023 show free T4 of 1.38 and TSH of 1.04.  Patient is here for 1 year follow-up with thyroid function test lab.  Patient reports that in December 2023 or January of this year, family physician when I had not obtained the thyroid lab at elsewhere and told her that it was thyroid toxic and decreased her dose to 75 mcg p.o. daily.  Since then patient has been tired and sleeping a lot.\"  At last visit on 8/29/2024, patient was increased from 75 mcg to 88 mcg daily and instructed to take her Synthroid on an empty stomach first thing in the morning 30 minutes prior to any other medications.  Patient has also been on Restasis for her Sjogren syndrome and is followed by Dr. Doyle who is going to possibly proceed with cataract surgery on her.  --Patient's lab results from 3/11/2025 are as follows:  TSH " "of 1.42, free T4 of 1.22, free T3 1.96.  Patient presents today for 6-month follow-up with lab review.  Patient has been increasingly sleepy and fatigue lately.   Also has had a lot of life stressors lately with her husbands passing.   Patient going to see cardiology for an eval soon.   3/21/2025  With patient's significant increase in fatigue and patient having normal TSH and T4 but mildly decreased T3, patient was continued on Synthroid at 88 mcg and placed on levothyroxine 5 mcg daily.  - Thyroid function testing along with vitamin labs ordered for 1 month.  Labs from 4/17/2025:  TSH 0.388, T4 1.21, T3 3.57 (normal limits).   B12, folate, vitamin D normal (though on the low end of normal).  - Patient already on vitamin D supplementation weekly.  4/25/25  Dk presents today with significant improvement in her fatigue symptoms with the addition of Cytomel 5 mcg daily.    Vital Signs:  Vitals:    04/25/25 1304   BP: 122/72   Pulse: 74   Temp: 97.8 °F (36.6 °C)   SpO2: 95%        Past Medical History:   Diagnosis Date    Asthma     Atypical chest pain     STRESS TEST 6/21/2021 CLEARED BY DR. PANG 6/22/21 PER KATI     BPPV (benign paroxysmal positional vertigo), right     Dyslipidemia     Dysphasia     Fissure in skin     BREAST - HAD IMPLANTS REMOVED    GERD (gastroesophageal reflux disease)     Hyperlipidemia     Hypertension     Obstructive sleep apnea     Compliant with CPAP    Pneumothorax     X3 \"AS A YOUNG GIRL\"    PONV (postoperative nausea and vomiting)     Sjogren's syndrome     Thyroid nodule     Vertigo        Past Surgical History:   Procedure Laterality Date    APPENDECTOMY      WITH HYST    BREAST IMPLANT SURGERY Bilateral     REMOVED     CHOLECYSTECTOMY      LAP    COLONOSCOPY      COLONOSCOPY N/A 06/23/2021    Procedure: COLONOSCOPY;  Surgeon: Marcio Kang MD;  Location: [facility] ENDOSCOPY;  Service: Gastroenterology;  Laterality: N/A;  NORMAL COLON    ENDOSCOPY N/A 06/23/2021    " Procedure: ESOPHAGOGASTRODUODENOSCOPY WITH BIOPSIES;  Surgeon: Marcio Kang MD;  Location: [facility] ENDOSCOPY;  Service: Gastroenterology;  Laterality: N/A;  REFLUX ESOPHAGITIS    ENDOSCOPY N/A 6/18/2024    Procedure: ESOPHAGOGASTRODUODENOSCOPY WITH BIOPSIES;  Surgeon: Marcio Kang MD;  Location: [facility] ENDOSCOPY;  Service: Gastroenterology;  Laterality: N/A;  ESOPHAGITIS, HIATAL HERNIA    HYSTERECTOMY      PLEURAL SCARIFICATION Right     THYROIDECTOMY Right 5/18/2022    Procedure: RIGHT THYROIDECTOMY WITH FROZEN SECTION, POSSIBLE TOTAL, RECURRENT LARYNGEAL NERVE MONITORING;  Surgeon: Mauricio Friedman MD;  Location: [facility] MAIN OR;  Service: ENT;  Laterality: Right;    TONSILLECTOMY           Current Outpatient Medications:     albuterol sulfate  (90 Base) MCG/ACT inhaler, Inhale 1 puff Every 6 (Six) Hours As Needed., Disp: , Rfl:     ALLERGY SERUM INJECTION, Inject  under the skin into the appropriate area as directed 1 (One) Time. Weekly, Disp: , Rfl:     atorvastatin (LIPITOR) 20 MG tablet, Take 1 tablet by mouth Every Night., Disp: , Rfl:     azelastine (ASTELIN) 0.1 % nasal spray, Administer 1-2 sprays into the nostril(s) as directed by provider 2 (Two) Times a Day., Disp: , Rfl:     B Complex Vitamins (B COMPLEX 1 PO), Take  by mouth., Disp: , Rfl:     budesonide-formoterol (Symbicort) 80-4.5 MCG/ACT inhaler, Inhale 2 puffs 2 (Two) Times a Day., Disp: 1 each, Rfl: 1    coenzyme Q10 100 MG capsule, Take 1 capsule by mouth Every Night., Disp: , Rfl:     cycloSPORINE (RESTASIS) 0.05 % ophthalmic emulsion, 1 drop 2 (Two) Times a Day., Disp: , Rfl:     diltiaZEM (CARDIZEM) 30 MG tablet, Take 1 tablet by mouth As Needed (ESOPHAGEAL SPASM)., Disp: , Rfl:     fluticasone (FLONASE) 50 MCG/ACT nasal spray, 2 sprays by Each Nare route Daily., Disp: , Rfl:     hydrochlorothiazide (MICROZIDE) 12.5 MG capsule, Take 1 capsule by mouth Every Morning., Disp: , Rfl:     hydroCHLOROthiazide 12.5 MG tablet,  Take 1 tablet by mouth Every Morning., Disp: , Rfl:     levothyroxine (SYNTHROID, LEVOTHROID) 88 MCG tablet, Take 1 tablet by mouth Daily. 75MCG, Disp: 90 tablet, Rfl: 2    liothyronine (Cytomel) 5 MCG tablet, Take 1 tablet by mouth Daily for 90 days., Disp: 90 tablet, Rfl: 1    Magnesium 500 MG tablet, Take  by mouth 3 (Three) Times a Week., Disp: , Rfl:     olmesartan (BENICAR) 20 MG tablet, Take 1 tablet by mouth 2 (Two) Times a Day., Disp: , Rfl:     Turmeric (QC TUMERIC COMPLEX PO), Take 1 capsule by mouth Daily., Disp: , Rfl:     vitamin D (ERGOCALCIFEROL) 1.25 MG (59174 UT) capsule capsule, Take 1 capsule by mouth 1 (One) Time Per Week., Disp: , Rfl:     EPINEPHrine (EPIPEN) 0.3 MG/0.3ML solution auto-injector injection, 0.3 mL., Disp: , Rfl:      Allergies   Allergen Reactions    Effexor [Venlafaxine] Anaphylaxis    Wasp Venom Anaphylaxis    Latex Hives and Rash    Penicillins Rash    Tetanus Toxoids Hives and Swelling       Social History     Tobacco Use    Smoking status: Former     Current packs/day: 0.00     Average packs/day: 1 pack/day for 18.0 years (18.0 ttl pk-yrs)     Types: Cigarettes     Start date: 1963     Quit date: 1981     Years since quittin.8     Passive exposure: Past    Smokeless tobacco: Never   Vaping Use    Vaping status: Never Used   Substance Use Topics    Alcohol use: Yes     Alcohol/week: 1.0 standard drink of alcohol     Types: 1 Shots of liquor per week     Comment: daily-STOPPED WHEN COVID STARTED     Drug use: Never        Objective     Tobacco Use: Medium Risk (2025)    Patient History     Smoking Tobacco Use: Former     Smokeless Tobacco Use: Never     Passive Exposure: Past         Physical Exam    Constitutional   Appearance  well developed, well-nourished, alert and in no acute distress, voice clear and strong    Head   Inspection  no deformities or lesions, atraumatic    Face   Inspection  no facial lesions; House-Brackmann I/VI  bilaterally   Palpation  no TMJ crepitus nor  muscle tenderness bilaterally     Eyes/Vision   Visual Fields  extraocular movements are intact, no spontaneous or gaze-induced nystagmus  Conjunctivae  clear   Sclerae  clear   Pupils and Irises  pupils equal, round, and reactive to light.   Nystagmus  not present     Ears, Nose, Mouth and Throat  Ears  External Ears  Auricles appearance within normal limits, no lesions present   Otoscopic Examination  tympanic membrane appearance within normal limits bilaterally without perforations, well-aerated middle ears without evidence of effusion  Hearing  intact to conversational voice both ears   Tunning fork testing    Rinne:  [place]:    Nose  External Nose  appearance normal   Intranasal Exam  mucosa within normal limits, vestibules normal, no intranasal lesions present, septum midline, sinuses non tender to percussion   Modified [place] Test:    Oral Cavity  Oral Mucosa  oral mucosa normal without pallor or cyanosis   Stensen's and Warthin's ducts are productive and patent with clear saliva  Lips  lip appearance normal   Teeth  normal dentition for age   Gums  gums pink, non-swollen, no bleeding present   Tongue  tongue appearance normal; normal mobility   Palate  hard palate normal, soft palate appearance normal with symmetric mobility     Throat  Oropharynx  no inflammation or lesions present  Tonsils  Bilateral tonsils unremarkable  Hypopharynx  appearance within normal limits   Larynx  voice normal     Neck  Inspection/Palpation  normal appearance, no masses or tenderness, trachea midline; thyroid size normal, nontender, no nodules or masses present on palpation     Lymphatic  Neck  no lymphadenopathy present   Supraclavicular Nodes  no lymphadenopathy present   Preauricular Nodes  no lymphadenopathy present     Respiratory  Respiratory Effort  breathing unlabored   Inspection of Chest  normal appearance, no retractions     Musculoskeletal   Cervical back:  Normal range of motion and neck supple.      Skin and Subcutaneous Tissue  General Inspection  Regarding face and neck - there are no rashes present, no lesions present, and no areas of discoloration     Neurologic  Cranial Nerves  Alert and oriented x3  cranial nerves II-XII are grossly intact bilaterally   Gait and Station  normal gait, able to stand without diffculty    Psychiatric  Judgement and Insight  judgment and insight intact   Mood and Affect  mood normal, affect appropriate       RESULTS REVIEWED    I have reviewed the following information:   [x]  Previous Internal Note  []  Previous External Note:   [x]  Ordered Tests & Results:      Pathology:   Lab Results   Component Value Date    Microscopic Description  06/18/2024     Microscopic examination performed.         TSH   Date Value Ref Range Status   04/17/2025 0.388 0.270 - 4.200 uIU/mL Final     T3, Free   Date Value Ref Range Status   04/17/2025 3.57 2.00 - 4.40 pg/mL Final     Free T4   Date Value Ref Range Status   04/17/2025 1.21 0.92 - 1.68 ng/dL Final     PTH, Intact   Date Value Ref Range Status   06/06/2022 19.4 15.0 - 65.0 pg/mL Final     Calcium   Date Value Ref Range Status   06/06/2022 10.2 8.6 - 10.5 mg/dL Final   06/26/2021 9.4 8.9 - 10.2 mg/dL Final     25 Hydroxy, Vitamin D   Date Value Ref Range Status   04/17/2025 45.2 30.0 - 100.0 ng/ml Final       No Images in the past 120 days found..    I have discussed the interpretation of the above results with the patient.    Procedures          Assessment and Plan   Diagnoses and all orders for this visit:    1. Hypothyroidism (acquired) (Primary)  -     T3, Free; Future  -     TSH; Future  -     T4, free; Future  -     liothyronine (Cytomel) 5 MCG tablet; Take 1 tablet by mouth Daily for 90 days.  Dispense: 90 tablet; Refill: 1  -     levothyroxine (SYNTHROID, LEVOTHROID) 88 MCG tablet; Take 1 tablet by mouth Daily. 75MCG  Dispense: 90 tablet; Refill: 2    2. Vitamin D deficiency    3.  Fatigue, unspecified type  -     liothyronine (Cytomel) 5 MCG tablet; Take 1 tablet by mouth Daily for 90 days.  Dispense: 90 tablet; Refill: 1  -     levothyroxine (SYNTHROID, LEVOTHROID) 88 MCG tablet; Take 1 tablet by mouth Daily. 75MCG  Dispense: 90 tablet; Refill: 2    4. Status post partial thyroidectomy  -     T3, Free; Future  -     TSH; Future  -     T4, free; Future        (E03.9) Hypothyroidism (acquired) - Plan: T3, Free, TSH, T4, free, liothyronine (Cytomel) 5 MCG tablet, levothyroxine (SYNTHROID, LEVOTHROID) 88 MCG tablet    (E55.9) Vitamin D deficiency    (R53.83) Fatigue, unspecified type - Plan: liothyronine (Cytomel) 5 MCG tablet, levothyroxine (SYNTHROID, LEVOTHROID) 88 MCG tablet    (E89.0) Status post partial thyroidectomy - Plan: T3, Free, TSH, T4, free     Dk Zuniga  reports that she quit smoking about 43 years ago. Her smoking use included cigarettes. She started smoking about 61 years ago. She has a 18 pack-year smoking history. She has been exposed to tobacco smoke. She has never used smokeless tobacco.       Plan:  Patient Instructions   1.  Patient's thyroid function testing all within normal limits after addition of Cytomel 5 mcg daily.  Patient to continue both levothyroxine 88 mcg daily and Cytomel 5 mcg daily.  2.  Patient's vitamin D was borderline low but within normal limits.  Patient recommended to continue her vitamin D supplementation.  3.  I will see patient back in 6 months with repeat lab review.    Follow Up   Return in about 6 months (around 10/25/2025), or with thyroid labs.  Patient was given instructions and counseling regarding her condition or for health maintenance advice. Please see specific information pulled into the AVS if appropriate.      All or a portion of this Note was dictated utilizing Dragon Dictation.

## 2025-04-25 ENCOUNTER — OFFICE VISIT (OUTPATIENT)
Dept: OTOLARYNGOLOGY | Facility: CLINIC | Age: 75
End: 2025-04-25
Payer: MEDICARE

## 2025-04-25 VITALS
OXYGEN SATURATION: 95 % | HEART RATE: 74 BPM | DIASTOLIC BLOOD PRESSURE: 72 MMHG | SYSTOLIC BLOOD PRESSURE: 122 MMHG | TEMPERATURE: 97.8 F

## 2025-04-25 DIAGNOSIS — E89.0 STATUS POST PARTIAL THYROIDECTOMY: ICD-10-CM

## 2025-04-25 DIAGNOSIS — E55.9 VITAMIN D DEFICIENCY: ICD-10-CM

## 2025-04-25 DIAGNOSIS — R53.83 FATIGUE, UNSPECIFIED TYPE: ICD-10-CM

## 2025-04-25 DIAGNOSIS — E03.9 HYPOTHYROIDISM (ACQUIRED): Primary | ICD-10-CM

## 2025-04-25 PROCEDURE — 1159F MED LIST DOCD IN RCRD: CPT

## 2025-04-25 PROCEDURE — 99214 OFFICE O/P EST MOD 30 MIN: CPT

## 2025-04-25 PROCEDURE — 1160F RVW MEDS BY RX/DR IN RCRD: CPT

## 2025-04-25 PROCEDURE — 3074F SYST BP LT 130 MM HG: CPT

## 2025-04-25 PROCEDURE — 3078F DIAST BP <80 MM HG: CPT

## 2025-04-25 RX ORDER — LEVOTHYROXINE SODIUM 88 UG/1
88 TABLET ORAL DAILY
Qty: 90 TABLET | Refills: 2 | Status: SHIPPED | OUTPATIENT
Start: 2025-04-25

## 2025-04-25 RX ORDER — LIOTHYRONINE SODIUM 5 UG/1
5 TABLET ORAL DAILY
Qty: 90 TABLET | Refills: 1 | Status: SHIPPED | OUTPATIENT
Start: 2025-04-25 | End: 2025-07-24

## 2025-04-25 NOTE — PATIENT INSTRUCTIONS
1.  Patient's thyroid function testing all within normal limits after addition of Cytomel 5 mcg daily.  Patient to continue both levothyroxine 88 mcg daily and Cytomel 5 mcg daily.  2.  Patient's vitamin D was borderline low but within normal limits.  Patient recommended to continue her vitamin D supplementation.  3.  I will see patient back in 6 months with repeat lab review.

## 2025-04-28 NOTE — PROGRESS NOTES
Primary Care Provider  Haritha Iraheta MD   Referring Provider  No ref. provider found    Patient Complaint  Follow-up and Shortness of Breath (On exertion)    Patient or patient representative verbalized consent for the use of Ambient Listening during the visit with  TY Castillo for chart documentation. 4/29/2025  13:05 EDT      Subjective       History of Presenting Illness  Dk Zuniga is a pleasant 75 y.o. female  of  Dr. Cortes who presents to [facility] with history of  Sjogren's disease, dyspnea on exertion, bronchiectasis, thyroid nodule, seasonal allergies, and tobacco abuse of cigarettes in remission, here for followup appointment.  Patient was last seen 4/29/2024.  Patient also has PATRICIA and on CPAP with nasal pillow and is managed by sleep medicine.  She continues under the care of Dr. Doyle for dry eyes with Sjogren's syndrome.    No recent diagnostics on her lungs.  Her last PFT was 12/28/2023 which was a normal study.  History of Present Illness  The patient is a 75-year-old female who presents for evaluation of allergies, sleep apnea, and Sjogren's syndrome.    She continues to utilize albuterol and Symbicort as part of her treatment regimen. She has been experiencing seasonal allergies, which she manages with bi-weekly injections and two different nasal sprays, although she does not use the sprays daily. She contracted COVID-19 during the previous Labor Day.    She is currently under the care of a sleep medicine specialist. She also uses a nasal pillow as part of her treatment for sleep apnea.    Her Sjogren's syndrome is being managed by Dr. La, who performed cataract surgery on both eyes in October and November of last year. She reports no issues related to the surgery.    She had a consultation with a cardiologist this morning and left her updated medication list there. Approximately 3 months ago, she experienced a weight gain of  10 pounds due to fluid retention, which has resulted in scar tissue formation and associated pain. Her cardiologist has recommended a change in her HCTZ medication. She has not undergone any recent blood work. Her blood pressure was within normal limits this morning, even though she had not taken her medication or eaten anything prior to the reading.    MEDICATIONS  Current: Albuterol, Symbicort, HCTZ     At present time patient denies dyspnea, coughing, wheezing, headaches, chest pain, weight loss or hemoptysis. Patient denies fevers, chills and night sweats. Dk Zuniga is able to perform ADLs.      I have personally reviewed the review of systems, past family, social, medical and surgical histories; and agree with their findings.      Review of Systems   Constitutional: Negative.    HENT: Negative.     Respiratory: Negative.     Cardiovascular: Negative.    Musculoskeletal: Negative.    Neurological: Negative.    Psychiatric/Behavioral: Negative.           Family History   Problem Relation Age of Onset    Heart disease Mother     Heart disease Father         qaud bi pass    Colon cancer Paternal Grandmother     Colon cancer Paternal Grandfather     Malig Hyperthermia Neg Hx         Social History     Socioeconomic History    Marital status:    Tobacco Use    Smoking status: Former     Current packs/day: 0.00     Average packs/day: 1 pack/day for 18.0 years (18.0 ttl pk-yrs)     Types: Cigarettes     Start date: 1963     Quit date: 1981     Years since quittin.8     Passive exposure: Past    Smokeless tobacco: Never   Vaping Use    Vaping status: Never Used   Substance and Sexual Activity    Alcohol use: Yes     Alcohol/week: 1.0 standard drink of alcohol     Types: 1 Shots of liquor per week     Comment: daily-STOPPED WHEN COVID STARTED     Drug use: Never    Sexual activity: Defer        Past Medical History:   Diagnosis Date    Asthma     Atypical chest pain     STRESS TEST 2021  "CLEARED BY DR. PANG 6/22/21 PER [facility]     BPPV (benign paroxysmal positional vertigo), right     Dyslipidemia     Dysphasia     Fissure in skin     BREAST - HAD IMPLANTS REMOVED    GERD (gastroesophageal reflux disease)     Hyperlipidemia     Hypertension     Obstructive sleep apnea     Compliant with CPAP    Pneumothorax     X3 \"AS A YOUNG GIRL\"    PONV (postoperative nausea and vomiting)     Sjogren's syndrome     Thyroid nodule     Vertigo         Immunization History   Administered Date(s) Administered    Arexvy (RSV, Adults 60+ yrs) 11/20/2023    COVID-19 (MODERNA) 1st,2nd,3rd Dose Monovalent 02/26/2021, 04/05/2021, 10/25/2021, 04/26/2022    COVID-19 (MODERNA) BIVALENT 12+YRS 10/05/2022    COVID-19 (MODERNA) Monovalent Original Booster 02/26/2021, 04/05/2021    FluMist 2-49yrs (Nasal) 09/30/2019, 09/23/2020    Fluad Quad 65+ 09/14/2021    Fluzone High-Dose 65+YRS 10/18/2022, 10/24/2024    Hepatitis A 03/06/2019    Pneumococcal, Unspecified 01/01/2021    Shingrix 08/02/2022       Allergies   Allergen Reactions    Effexor [Venlafaxine] Anaphylaxis    Wasp Venom Anaphylaxis    Latex Hives and Rash    Penicillins Rash    Tetanus Toxoids Hives and Swelling          Current Outpatient Medications:     albuterol sulfate  (90 Base) MCG/ACT inhaler, Inhale 1 puff Every 6 (Six) Hours As Needed., Disp: , Rfl:     ALLERGY SERUM INJECTION, Inject  under the skin into the appropriate area as directed 1 (One) Time. Weekly, Disp: , Rfl:     atorvastatin (LIPITOR) 20 MG tablet, Take 1 tablet by mouth Every Night., Disp: , Rfl:     azelastine (ASTELIN) 0.1 % nasal spray, Administer 1-2 sprays into the nostril(s) as directed by provider 2 (Two) Times a Day., Disp: , Rfl:     B Complex Vitamins (B COMPLEX 1 PO), Take  by mouth., Disp: , Rfl:     budesonide-formoterol (Symbicort) 80-4.5 MCG/ACT inhaler, Inhale 2 puffs 2 (Two) Times a Day., Disp: 1 each, Rfl: 1    coenzyme Q10 100 MG capsule, Take 1 capsule by mouth Every " "Night., Disp: , Rfl:     cycloSPORINE (RESTASIS) 0.05 % ophthalmic emulsion, 1 drop 2 (Two) Times a Day., Disp: , Rfl:     diltiaZEM (CARDIZEM) 30 MG tablet, Take 1 tablet by mouth As Needed (ESOPHAGEAL SPASM)., Disp: , Rfl:     EPINEPHrine (EPIPEN) 0.3 MG/0.3ML solution auto-injector injection, 0.3 mL., Disp: , Rfl:     fluticasone (FLONASE) 50 MCG/ACT nasal spray, 2 sprays by Each Nare route Daily., Disp: , Rfl:     hydrochlorothiazide (MICROZIDE) 12.5 MG capsule, Take 1 capsule by mouth Every Morning., Disp: , Rfl:     hydroCHLOROthiazide 12.5 MG tablet, Take 1 tablet by mouth Every Morning., Disp: , Rfl:     levothyroxine (SYNTHROID, LEVOTHROID) 88 MCG tablet, Take 1 tablet by mouth Daily. 75MCG, Disp: 90 tablet, Rfl: 2    liothyronine (Cytomel) 5 MCG tablet, Take 1 tablet by mouth Daily for 90 days., Disp: 90 tablet, Rfl: 1    Magnesium 500 MG tablet, Take  by mouth 3 (Three) Times a Week., Disp: , Rfl:     olmesartan (BENICAR) 20 MG tablet, Take 1 tablet by mouth 2 (Two) Times a Day., Disp: , Rfl:     Turmeric (QC TUMERIC COMPLEX PO), Take 1 capsule by mouth Daily., Disp: , Rfl:     vitamin D (ERGOCALCIFEROL) 1.25 MG (65131 UT) capsule capsule, Take 1 capsule by mouth 1 (One) Time Per Week., Disp: , Rfl:          Vital Signs   /49 (BP Location: Right arm, Patient Position: Sitting, Cuff Size: Adult)   Pulse 88   Temp 98.6 °F (37 °C)   Resp 16   Ht 162.6 cm (64.02\")   Wt 84.8 kg (187 lb)   SpO2 98%   BMI 32.08 kg/m²       Objective     Physical Exam  Vitals reviewed.   Constitutional:       Appearance: Normal appearance.   HENT:      Head: Normocephalic and atraumatic.      Nose: Nose normal.      Mouth/Throat:      Mouth: Mucous membranes are moist.      Pharynx: Oropharynx is clear.   Eyes:      Extraocular Movements: Extraocular movements intact.      Conjunctiva/sclera: Conjunctivae normal.      Pupils: Pupils are equal, round, and reactive to light.   Cardiovascular:      Rate and Rhythm: " Normal rate and regular rhythm.      Pulses: Normal pulses.      Heart sounds: Normal heart sounds.   Pulmonary:      Effort: Pulmonary effort is normal.      Breath sounds: Normal breath sounds.   Abdominal:      General: Bowel sounds are normal.   Musculoskeletal:         General: Normal range of motion.      Cervical back: Normal range of motion and neck supple.   Skin:     General: Skin is warm and dry.   Neurological:      Mental Status: She is alert and oriented to person, place, and time.   Psychiatric:         Behavior: Behavior normal.         Physical Exam  Lungs are clear.  Heart rhythm is regular.         Results Review  I have personally reviewed the prior office notes, hospital records, labs, and diagnostics.    Results  Testing  Last pulmonary function test was normal.          Assessment         Patient Active Problem List   Diagnosis    Lisfranc dislocation    Nondisplaced fracture of third metatarsal bone of left foot with routine healing    Nondisplaced fracture of fourth metatarsal bone of left foot with routine healing    Foot fracture, left    Reflux esophagitis    PATRICIA on CPAP    Dysphagia    Elevated antinuclear antibody (JUICE) level    Essential hypertension    Family history of colon cancer    Lung disease    GERD (gastroesophageal reflux disease)    High cholesterol    History of dysphagia    Localized, primary osteoarthritis of hand    Personal history of other diseases of the circulatory system    Sjogren's disease    Spontaneous pneumothorax    Tendinitis of flexor tendon of right hand    Class 1 obesity    Asthma        Plan     Diagnoses and all orders for this visit:    1. Bronchiectasis without acute exacerbation (Primary)    2. Sjogren's syndrome, with unspecified organ involvement         Assessment & Plan  1. Allergies.  She reports experiencing allergies this spring. She takes a shot every 2 weeks and uses two different nasal sprays as needed. She prefers to tough it out rather  than using over-the-counter medications.    2. Sleep apnea.  She continues to use a nasal pillow for sleep apnea management. No changes in her condition were reported.    3. Sjogren's syndrome.  She is under the care of Dr. La for Sjogren's syndrome affecting her eyes. She had cataract surgery in both eyes in October and November of last year and is doing well except for current allergy symptoms.    4. Medication management.  She is currently on albuterol and Symbicort. A prescription refill for albuterol and Symbicort has been provided for one year. The prescriptions will be sent to [facility] in [place].    5. Fluid retention.  She reports retaining fluid and gaining 10 pounds, which causes discomfort. She has been advised to discuss this with her cardiologist, who has decided to change her HCTZ medication.    Follow-up  The patient will follow up in 1 year, or earlier if necessary.    PROCEDURE  Cataract surgery was performed on both eyes in October and November of last year.      Smoking status:  reports that she quit smoking about 43 years ago. Her smoking use included cigarettes. She started smoking about 61 years ago. She has a 18 pack-year smoking history. She has been exposed to tobacco smoke. She has never used smokeless tobacco.    Vaccination status: Reviewed  Immunization History   Administered Date(s) Administered    Arexvy (RSV, Adults 60+ yrs) 11/20/2023    COVID-19 (MODERNA) 1st,2nd,3rd Dose Monovalent 02/26/2021, 04/05/2021, 10/25/2021, 04/26/2022    COVID-19 (MODERNA) BIVALENT 12+YRS 10/05/2022    COVID-19 (MODERNA) Monovalent Original Booster 02/26/2021, 04/05/2021    FluMist 2-49yrs (Nasal) 09/30/2019, 09/23/2020    Fluad Quad 65+ 09/14/2021    Fluzone High-Dose 65+YRS 10/18/2022, 10/24/2024    Hepatitis A 03/06/2019    Pneumococcal, Unspecified 01/01/2021    Shingrix 08/02/2022        Medications personally reviewed    Follow Up  Return in about 1 year (around  4/29/2026).    Patient was given instructions and counseling regarding her condition or for health maintenance advice. Please see specific information pulled into the AVS if appropriate.     I spent 15 minutes caring for Dk Zuniga on this date of service. This time includes time spent by me in the following activities:preparing for the visit, reviewing tests, obtaining and/or reviewing a separately obtained history, performing a medically appropriate examination and/or evaluation, counseling and educating the patient/family/caregiver, ordering medications, tests, or procedures, documenting information in the medical record, independently interpreting results and communicating that information with the patient/family/caregiver and answered questions family members, discuss medications.

## 2025-04-29 ENCOUNTER — OFFICE VISIT (OUTPATIENT)
Dept: PULMONOLOGY | Facility: CLINIC | Age: 75
End: 2025-04-29
Payer: MEDICARE

## 2025-04-29 VITALS
HEIGHT: 64 IN | BODY MASS INDEX: 31.92 KG/M2 | WEIGHT: 187 LBS | DIASTOLIC BLOOD PRESSURE: 49 MMHG | HEART RATE: 88 BPM | TEMPERATURE: 98.6 F | RESPIRATION RATE: 16 BRPM | SYSTOLIC BLOOD PRESSURE: 146 MMHG | OXYGEN SATURATION: 98 %

## 2025-04-29 DIAGNOSIS — M35.00 SJOGREN'S SYNDROME, WITH UNSPECIFIED ORGAN INVOLVEMENT: ICD-10-CM

## 2025-04-29 DIAGNOSIS — J47.9 BRONCHIECTASIS WITHOUT ACUTE EXACERBATION: Primary | ICD-10-CM

## 2025-04-29 RX ORDER — BUDESONIDE AND FORMOTEROL FUMARATE DIHYDRATE 80; 4.5 UG/1; UG/1
2 AEROSOL RESPIRATORY (INHALATION) 2 TIMES DAILY
Qty: 1 EACH | Refills: 11 | Status: SHIPPED | OUTPATIENT
Start: 2025-04-29

## 2025-04-29 RX ORDER — ALBUTEROL SULFATE 90 UG/1
1 INHALANT RESPIRATORY (INHALATION) EVERY 6 HOURS PRN
Qty: 18 G | Refills: 11 | Status: SHIPPED | OUTPATIENT
Start: 2025-04-29

## (undated) DEVICE — SINGLE-USE BIOPSY FORCEPS: Brand: RADIAL JAW 4

## (undated) DEVICE — DRSNG SURESITE WNDW 4X4.5

## (undated) DEVICE — SOLIDIFIER LIQLOC PLS 1500CC BT

## (undated) DEVICE — DRAIN JACKSON PRATT 10FR 1/8END: Brand: CARDINAL HEALTH

## (undated) DEVICE — Device: Brand: DEFENDO AIR/WATER/SUCTION AND BIOPSY VALVE

## (undated) DEVICE — CONN JET HYDRA H20 AUXILIARY DISP

## (undated) DEVICE — VAGINAL PREP TRAY: Brand: MEDLINE INDUSTRIES, INC.

## (undated) DEVICE — SOL IRRG H2O PL/BG 1000ML STRL

## (undated) DEVICE — EGD OR ERCP KIT: Brand: MEDLINE INDUSTRIES, INC.

## (undated) DEVICE — RETR RNG GEN2 18.6X8.9CM

## (undated) DEVICE — ENT-LF: Brand: MEDLINE INDUSTRIES, INC.

## (undated) DEVICE — Device

## (undated) DEVICE — SUT VIC PLS UD BR COAT 4/0 PS4 18IN

## (undated) DEVICE — INTENDED FOR TISSUE SEPARATION, AND OTHER PROCEDURES THAT REQUIRE A SHARP SURGICAL BLADE TO PUNCTURE OR CUT.: Brand: BARD-PARKER ® CARBON RIB-BACK BLADES

## (undated) DEVICE — CONTAINER,SPECIMEN,O.R.STRL,4.5OZ: Brand: MEDLINE

## (undated) DEVICE — SUT PROLN 6/0 P1 18IN 8697G

## (undated) DEVICE — DRN WND JP RND W TROC SIL 10F 1/8IN

## (undated) DEVICE — BLCK/BITE BLOX WO/DENTL/RIM W/STRAP 54F

## (undated) DEVICE — PENCL E/S SMOKEEVAC W/TELESCP CANN

## (undated) DEVICE — LINER SURG CANSTR SXN S/RIGD 1500CC

## (undated) DEVICE — GLV SURG BIOGEL LTX PF 7 1/2

## (undated) DEVICE — RETR STAY ELAS SLD/BLD 6.5X16MM PK/4

## (undated) DEVICE — PROBE 8225825 3PK INCREMT STD PRASS ROHS

## (undated) DEVICE — EMG TUBE 8229707 NIM TRIVANTAGE 7.0MM ID: Brand: NIM TRIVANTAGE™

## (undated) DEVICE — COLON KIT: Brand: MEDLINE INDUSTRIES, INC.

## (undated) DEVICE — SUT VIC COAT 5/0 P3 18IN

## (undated) DEVICE — ROUND DISSECTORS: Brand: DEROYAL

## (undated) DEVICE — DEV OPN LIGASURE DISSCT EXACT 40DEG 21.6MM BX/1

## (undated) DEVICE — SLV SCD KN/LEN ADJ EXPRSS BLENDED MD 1P/U